# Patient Record
Sex: FEMALE | Race: WHITE | NOT HISPANIC OR LATINO | Employment: FULL TIME | URBAN - METROPOLITAN AREA
[De-identification: names, ages, dates, MRNs, and addresses within clinical notes are randomized per-mention and may not be internally consistent; named-entity substitution may affect disease eponyms.]

---

## 2017-05-19 DIAGNOSIS — B86 SCABIES: Primary | ICD-10-CM

## 2017-05-19 RX ORDER — PERMETHRIN 50 MG/G
CREAM TOPICAL
Qty: 60 G | Refills: 0 | Status: SHIPPED | OUTPATIENT
Start: 2017-05-19 | End: 2018-09-20

## 2017-06-26 DIAGNOSIS — G43.909 MIGRAINE WITHOUT STATUS MIGRAINOSUS, NOT INTRACTABLE, UNSPECIFIED MIGRAINE TYPE: ICD-10-CM

## 2017-06-26 RX ORDER — BUTALBITAL, ACETAMINOPHEN AND CAFFEINE 50; 325; 40 MG/1; MG/1; MG/1
1 TABLET ORAL EVERY 8 HOURS PRN
Qty: 30 TABLET | Refills: 1 | Status: SHIPPED | OUTPATIENT
Start: 2017-06-26 | End: 2018-09-20 | Stop reason: SDUPTHER

## 2017-09-11 DIAGNOSIS — G43.719 INTRACTABLE CHRONIC MIGRAINE WITHOUT AURA AND WITHOUT STATUS MIGRAINOSUS: ICD-10-CM

## 2017-09-11 DIAGNOSIS — K04.7 TOOTH ABSCESS: Primary | ICD-10-CM

## 2017-09-11 RX ORDER — ELETRIPTAN HYDROBROMIDE 40 MG/1
40 TABLET, FILM COATED ORAL
Qty: 10 TABLET | Refills: 1 | Status: SHIPPED | OUTPATIENT
Start: 2017-09-11 | End: 2018-09-20 | Stop reason: SDUPTHER

## 2018-09-20 ENCOUNTER — OFFICE VISIT (OUTPATIENT)
Dept: INTERNAL MEDICINE | Facility: CLINIC | Age: 48
End: 2018-09-20
Attending: INTERNAL MEDICINE
Payer: COMMERCIAL

## 2018-09-20 ENCOUNTER — LAB VISIT (OUTPATIENT)
Dept: LAB | Facility: OTHER | Age: 48
End: 2018-09-20
Attending: INTERNAL MEDICINE
Payer: COMMERCIAL

## 2018-09-20 VITALS
WEIGHT: 151 LBS | BODY MASS INDEX: 23.7 KG/M2 | OXYGEN SATURATION: 99 % | SYSTOLIC BLOOD PRESSURE: 120 MMHG | HEIGHT: 67 IN | DIASTOLIC BLOOD PRESSURE: 80 MMHG | HEART RATE: 68 BPM

## 2018-09-20 DIAGNOSIS — E55.9 VITAMIN D DEFICIENCY: ICD-10-CM

## 2018-09-20 DIAGNOSIS — F17.200 SMOKER: ICD-10-CM

## 2018-09-20 DIAGNOSIS — Z00.00 ROUTINE ADULT HEALTH MAINTENANCE: Primary | ICD-10-CM

## 2018-09-20 DIAGNOSIS — E78.9 DISORDER OF LIPID METABOLISM: ICD-10-CM

## 2018-09-20 DIAGNOSIS — D51.0 PERNICIOUS ANEMIA: ICD-10-CM

## 2018-09-20 DIAGNOSIS — R79.89 OTHER SPECIFIED ABNORMAL FINDINGS OF BLOOD CHEMISTRY: ICD-10-CM

## 2018-09-20 DIAGNOSIS — D50.8 OTHER IRON DEFICIENCY ANEMIA: ICD-10-CM

## 2018-09-20 DIAGNOSIS — G43.909 MIGRAINE WITHOUT STATUS MIGRAINOSUS, NOT INTRACTABLE, UNSPECIFIED MIGRAINE TYPE: ICD-10-CM

## 2018-09-20 DIAGNOSIS — E03.9 HYPOTHYROIDISM, UNSPECIFIED TYPE: ICD-10-CM

## 2018-09-20 DIAGNOSIS — J33.9 NASAL POLYPS: ICD-10-CM

## 2018-09-20 LAB
25(OH)D3+25(OH)D2 SERPL-MCNC: 25 NG/ML
ALBUMIN SERPL BCP-MCNC: 4.5 G/DL
ALP SERPL-CCNC: 60 U/L
ALT SERPL W/O P-5'-P-CCNC: 20 U/L
ANION GAP SERPL CALC-SCNC: 8 MMOL/L
AST SERPL-CCNC: 24 U/L
BASOPHILS # BLD AUTO: 0.03 K/UL
BASOPHILS NFR BLD: 0.3 %
BILIRUB SERPL-MCNC: 0.7 MG/DL
BUN SERPL-MCNC: 11 MG/DL
CALCIUM SERPL-MCNC: 9.9 MG/DL
CHLORIDE SERPL-SCNC: 105 MMOL/L
CHOLEST SERPL-MCNC: 248 MG/DL
CHOLEST/HDLC SERPL: 3.4 {RATIO}
CO2 SERPL-SCNC: 27 MMOL/L
CREAT SERPL-MCNC: 0.8 MG/DL
CRP SERPL-MCNC: 0.7 MG/L
DIFFERENTIAL METHOD: ABNORMAL
EOSINOPHIL # BLD AUTO: 0.2 K/UL
EOSINOPHIL NFR BLD: 2.3 %
ERYTHROCYTE [DISTWIDTH] IN BLOOD BY AUTOMATED COUNT: 13.2 %
EST. GFR  (AFRICAN AMERICAN): >60 ML/MIN/1.73 M^2
EST. GFR  (NON AFRICAN AMERICAN): >60 ML/MIN/1.73 M^2
ESTIMATED AVG GLUCOSE: 105 MG/DL
GLUCOSE SERPL-MCNC: 102 MG/DL
HBA1C MFR BLD HPLC: 5.3 %
HCT VFR BLD AUTO: 41.8 %
HDLC SERPL-MCNC: 72 MG/DL
HDLC SERPL: 29 %
HGB BLD-MCNC: 13.7 G/DL
LDLC SERPL CALC-MCNC: 158.6 MG/DL
LYMPHOCYTES # BLD AUTO: 1.9 K/UL
LYMPHOCYTES NFR BLD: 21 %
MCH RBC QN AUTO: 32.9 PG
MCHC RBC AUTO-ENTMCNC: 32.8 G/DL
MCV RBC AUTO: 101 FL
MONOCYTES # BLD AUTO: 0.7 K/UL
MONOCYTES NFR BLD: 7.4 %
NEUTROPHILS # BLD AUTO: 6.4 K/UL
NEUTROPHILS NFR BLD: 68.8 %
NONHDLC SERPL-MCNC: 176 MG/DL
PLATELET # BLD AUTO: 260 K/UL
PMV BLD AUTO: 10.8 FL
POTASSIUM SERPL-SCNC: 4.5 MMOL/L
PROT SERPL-MCNC: 8 G/DL
RBC # BLD AUTO: 4.16 M/UL
SODIUM SERPL-SCNC: 140 MMOL/L
TRIGL SERPL-MCNC: 87 MG/DL
TSH SERPL DL<=0.005 MIU/L-ACNC: 1.22 UIU/ML
VIT B12 SERPL-MCNC: 480 PG/ML
WBC # BLD AUTO: 9.25 K/UL

## 2018-09-20 PROCEDURE — 80061 LIPID PANEL: CPT

## 2018-09-20 PROCEDURE — 36415 COLL VENOUS BLD VENIPUNCTURE: CPT

## 2018-09-20 PROCEDURE — 83036 HEMOGLOBIN GLYCOSYLATED A1C: CPT

## 2018-09-20 PROCEDURE — 85025 COMPLETE CBC W/AUTO DIFF WBC: CPT

## 2018-09-20 PROCEDURE — 82607 VITAMIN B-12: CPT

## 2018-09-20 PROCEDURE — 80053 COMPREHEN METABOLIC PANEL: CPT

## 2018-09-20 PROCEDURE — 84443 ASSAY THYROID STIM HORMONE: CPT

## 2018-09-20 PROCEDURE — 82306 VITAMIN D 25 HYDROXY: CPT

## 2018-09-20 PROCEDURE — 86140 C-REACTIVE PROTEIN: CPT

## 2018-09-20 PROCEDURE — 99396 PREV VISIT EST AGE 40-64: CPT | Mod: S$GLB,,, | Performed by: INTERNAL MEDICINE

## 2018-09-20 RX ORDER — BUTALBITAL, ACETAMINOPHEN AND CAFFEINE 50; 325; 40 MG/1; MG/1; MG/1
1 TABLET ORAL EVERY 8 HOURS PRN
Qty: 30 TABLET | Refills: 1 | Status: SHIPPED | OUTPATIENT
Start: 2018-09-20 | End: 2019-12-20

## 2018-09-20 RX ORDER — ELETRIPTAN HYDROBROMIDE 40 MG/1
40 TABLET, FILM COATED ORAL
Qty: 15 TABLET | Refills: 3 | Status: SHIPPED | OUTPATIENT
Start: 2018-09-20 | End: 2019-07-11

## 2018-09-20 NOTE — PROGRESS NOTES
Subjective:       Patient ID: Shoaib Esqueda is a 48 y.o. female.    Chief Complaint: Annual Exam (refill meds / wants chest x ray) and Migraine (persist.)      Adult Wellness Exam:    Mental Conditions: None  Depression Risk Annual Factors: None  BMI: See Vital signs   Colon screen:    See Health Maintenance Report      Females: Mammogram and PAP: per Gyn                                 Vaccines (Flu, Adacel, Shingrix): See Health Maintenance Report  Routine labs (Cholesterol, Glucose/Hgb A1C, and TSH): Done or ordered.     The patient's current health status is: Good   Patient was educated on all medical problems and routine health maintanence. See Patient Instructions.                               Migraine    This is a recurrent problem. The current episode started more than 1 year ago. The problem has been unchanged. Associated symptoms include sinus pressure.     Review of Systems   Constitutional: Negative.    HENT: Positive for congestion and sinus pressure.    Respiratory: Negative for shortness of breath.    Cardiovascular: Negative for chest pain.   Neurological: Positive for headaches.       Objective:      Physical Exam   Constitutional: She appears well-developed and well-nourished.   HENT:   Head: Normocephalic.   Eyes: Pupils are equal, round, and reactive to light.   Cardiovascular: Normal rate, regular rhythm and normal heart sounds. Exam reveals no friction rub.   Pulmonary/Chest: Effort normal.   Neurological: She is alert.       Assessment:       1. Routine adult health maintenance    2. Migraine without status migrainosus, not intractable, unspecified migraine type    3. Nasal polyps    4. Smoker        Plan:       Per orders and D/C instructions.  Check labs.  Quit smoking.

## 2018-12-04 ENCOUNTER — OFFICE VISIT (OUTPATIENT)
Dept: INTERNAL MEDICINE | Facility: CLINIC | Age: 48
End: 2018-12-04
Attending: INTERNAL MEDICINE
Payer: COMMERCIAL

## 2018-12-04 VITALS
HEART RATE: 90 BPM | HEIGHT: 67 IN | DIASTOLIC BLOOD PRESSURE: 86 MMHG | WEIGHT: 153 LBS | OXYGEN SATURATION: 97 % | BODY MASS INDEX: 24.01 KG/M2 | SYSTOLIC BLOOD PRESSURE: 124 MMHG

## 2018-12-04 DIAGNOSIS — R19.7 DIARRHEA, UNSPECIFIED TYPE: Primary | ICD-10-CM

## 2018-12-04 PROCEDURE — 99213 OFFICE O/P EST LOW 20 MIN: CPT | Mod: S$GLB,,, | Performed by: INTERNAL MEDICINE

## 2018-12-04 PROCEDURE — 3008F BODY MASS INDEX DOCD: CPT | Mod: CPTII,S$GLB,, | Performed by: INTERNAL MEDICINE

## 2018-12-04 RX ORDER — METRONIDAZOLE 250 MG/1
500 TABLET ORAL EVERY 8 HOURS
Qty: 30 TABLET | Refills: 0 | Status: SHIPPED | OUTPATIENT
Start: 2018-12-04 | End: 2019-07-11

## 2018-12-04 NOTE — PROGRESS NOTES
Subjective:       Patient ID: Shoaib Esqueda is a 48 y.o. female.    Chief Complaint: Abdominal Pain (lower / since the week before thanksgiving); Diarrhea (otc not working); Nausea; and GI Problem (gas, yellow stool)    Diarrhea    This is a new problem. The current episode started 1 to 4 weeks ago. The problem occurs 2 to 4 times per day. The problem has been gradually improving. The stool consistency is described as watery.     Review of Systems   Constitutional: Negative.    Respiratory: Negative for shortness of breath.    Cardiovascular: Negative for chest pain.       Objective:      Physical Exam   Constitutional: She appears well-developed and well-nourished.   HENT:   Head: Normocephalic.   Eyes: Pupils are equal, round, and reactive to light.   Cardiovascular: Normal rate, regular rhythm and normal heart sounds. Exam reveals no friction rub.   Pulmonary/Chest: Effort normal.   Abdominal: Soft. Bowel sounds are increased. There is tenderness in the suprapubic area. There is no rigidity, no rebound and no guarding.       Mildly tender   Neurological: She is alert.       Assessment:       1. Diarrhea, unspecified type        Plan:       Per orders and D/C instructions.  Try Flagyl for diarrhea.

## 2019-07-01 DIAGNOSIS — N92.6 MENSTRUAL BLEEDING PROBLEM: Primary | ICD-10-CM

## 2019-07-08 DIAGNOSIS — R10.9 ABDOMINAL PAIN, UNSPECIFIED ABDOMINAL LOCATION: Primary | ICD-10-CM

## 2019-07-11 ENCOUNTER — OFFICE VISIT (OUTPATIENT)
Dept: OBSTETRICS AND GYNECOLOGY | Facility: CLINIC | Age: 49
End: 2019-07-11
Attending: STUDENT IN AN ORGANIZED HEALTH CARE EDUCATION/TRAINING PROGRAM
Payer: COMMERCIAL

## 2019-07-11 VITALS
DIASTOLIC BLOOD PRESSURE: 82 MMHG | HEIGHT: 67 IN | WEIGHT: 144.19 LBS | BODY MASS INDEX: 22.63 KG/M2 | SYSTOLIC BLOOD PRESSURE: 118 MMHG

## 2019-07-11 DIAGNOSIS — R35.0 FREQUENT URINATION: Primary | ICD-10-CM

## 2019-07-11 DIAGNOSIS — R10.2 PELVIC PAIN: ICD-10-CM

## 2019-07-11 DIAGNOSIS — Z32.02 ENCOUNTER FOR PREGNANCY TEST, RESULT NEGATIVE: ICD-10-CM

## 2019-07-11 LAB
APPEARANCE UR: CLEAR
B-HCG UR QL: NEGATIVE
BACTERIA #/AREA URNS HPF: NORMAL /HPF
BACTERIA UR CULT: NORMAL
BILIRUB SERPL-MCNC: NORMAL MG/DL
BILIRUB UR QL STRIP: NEGATIVE
BLOOD URINE, POC: NORMAL
COLOR UR: YELLOW
COLOR, POC UA: YELLOW
CTP QC/QA: YES
GLUCOSE UR QL STRIP: NEGATIVE
GLUCOSE UR QL STRIP: NORMAL
HGB UR QL STRIP: NEGATIVE
HYALINE CASTS #/AREA URNS LPF: NORMAL /LPF
KETONES UR QL STRIP: NEGATIVE
KETONES UR QL STRIP: NORMAL
LEUKOCYTE ESTERASE UR QL STRIP: NEGATIVE
LEUKOCYTE ESTERASE URINE, POC: NORMAL
NITRITE UR QL STRIP: NEGATIVE
NITRITE, POC UA: NORMAL
PH UR STRIP: 6 [PH] (ref 5–8)
PH, POC UA: 5
PROT UR QL STRIP: NEGATIVE
PROTEIN, POC: NORMAL
RBC #/AREA URNS HPF: NORMAL /HPF
SP GR UR STRIP: 1.02 (ref 1–1.03)
SPECIFIC GRAVITY, POC UA: 1.01
SQUAMOUS #/AREA URNS HPF: NORMAL /HPF
UREA BREATH TEST QL: NOT DETECTED
UROBILINOGEN, POC UA: NORMAL
WBC #/AREA URNS HPF: NORMAL /HPF

## 2019-07-11 PROCEDURE — 99203 PR OFFICE/OUTPT VISIT, NEW, LEVL III, 30-44 MIN: ICD-10-PCS | Mod: 25,S$GLB,, | Performed by: STUDENT IN AN ORGANIZED HEALTH CARE EDUCATION/TRAINING PROGRAM

## 2019-07-11 PROCEDURE — 81002 URINALYSIS NONAUTO W/O SCOPE: CPT | Mod: S$GLB,,, | Performed by: STUDENT IN AN ORGANIZED HEALTH CARE EDUCATION/TRAINING PROGRAM

## 2019-07-11 PROCEDURE — 99999 PR PBB SHADOW E&M-EST. PATIENT-LVL III: ICD-10-PCS | Mod: PBBFAC,,, | Performed by: STUDENT IN AN ORGANIZED HEALTH CARE EDUCATION/TRAINING PROGRAM

## 2019-07-11 PROCEDURE — 3008F PR BODY MASS INDEX (BMI) DOCUMENTED: ICD-10-PCS | Mod: CPTII,S$GLB,, | Performed by: STUDENT IN AN ORGANIZED HEALTH CARE EDUCATION/TRAINING PROGRAM

## 2019-07-11 PROCEDURE — 81025 POCT URINE PREGNANCY: ICD-10-PCS | Mod: S$GLB,,, | Performed by: STUDENT IN AN ORGANIZED HEALTH CARE EDUCATION/TRAINING PROGRAM

## 2019-07-11 PROCEDURE — 87086 URINE CULTURE/COLONY COUNT: CPT

## 2019-07-11 PROCEDURE — 81025 URINE PREGNANCY TEST: CPT | Mod: S$GLB,,, | Performed by: STUDENT IN AN ORGANIZED HEALTH CARE EDUCATION/TRAINING PROGRAM

## 2019-07-11 PROCEDURE — 99999 PR PBB SHADOW E&M-EST. PATIENT-LVL III: CPT | Mod: PBBFAC,,, | Performed by: STUDENT IN AN ORGANIZED HEALTH CARE EDUCATION/TRAINING PROGRAM

## 2019-07-11 PROCEDURE — 81002 POCT URINE DIPSTICK WITHOUT MICROSCOPE: ICD-10-PCS | Mod: S$GLB,,, | Performed by: STUDENT IN AN ORGANIZED HEALTH CARE EDUCATION/TRAINING PROGRAM

## 2019-07-11 PROCEDURE — 99203 OFFICE O/P NEW LOW 30 MIN: CPT | Mod: 25,S$GLB,, | Performed by: STUDENT IN AN ORGANIZED HEALTH CARE EDUCATION/TRAINING PROGRAM

## 2019-07-11 PROCEDURE — 3008F BODY MASS INDEX DOCD: CPT | Mod: CPTII,S$GLB,, | Performed by: STUDENT IN AN ORGANIZED HEALTH CARE EDUCATION/TRAINING PROGRAM

## 2019-07-11 RX ORDER — VALACYCLOVIR HYDROCHLORIDE 1 G/1
1000 TABLET, FILM COATED ORAL
COMMUNITY
Start: 2019-05-21 | End: 2020-07-14 | Stop reason: SDUPTHER

## 2019-07-11 RX ORDER — CETIRIZINE HYDROCHLORIDE 5 MG/1
5 TABLET ORAL DAILY
COMMUNITY

## 2019-07-11 NOTE — PROGRESS NOTES
"Chief Complaint: Pelvic pain     HPI:      Shoaib Esqueda is a 48 y.o.  who presents today for evaluation of multiple complaints.  She reports a history of pelvic pain, possible endometriosis, possible PCOS.  She reports doing well on OCPs initially the had to change types and felt symptoms worsen.  She has not been on treatment for several years.  She reports the pain is left sided, occurs almost daily, feels "throbbing."  Denies any aggravating or alleviating factors.  Has trouble describing it further.  She reports it used to improve with her cycle but now she feels it all the time.   She also reports bowel movements 5-6 times per day.  Denies any diarrhea or constipation.  No blood in stool.      She also reports recurrent vaginitis and odor.  Reports she has struggled with this for years.  Reports a vaginal odor and recurrent bacterial vaginosis.  Does notice an odor today.      She also reports she does not get mammograms secondary to her implants and concerns that they may burst.      LMP: Patient's last menstrual period was 2019.  No other issues, problems, or complaints.  Ms. Esqueda is not currently sexually active. She is currently using no method for contraception. She declines STD screening today.    Previous Pap: 2019 Normal per patient  Previous Mammogram: 2 years ago normal per patient  Most Recent Dexa: NA  Colonoscopy: NA    GYN Hx:  Menarche 12.  Reports cycles occur every 28 days with menses lasting 1 days.  Denies history of abnormal pap smears or STIs.  Gardasil:  No.   OB History        3    Para        Term                AB   3    Living           SAB        TAB   3    Ectopic        Multiple        Live Births   0               Past Medical History:   Diagnosis Date    Hepatitis C      Rx w/ chemo x 3 months.  Current  viral load is zero  Recent IUD (copper)     Migraine 2014    Nasal polyps 2014    PCOS (polycystic ovarian syndrome) 2014    " PPD positive 10/27/2012     Past Surgical History:   Procedure Laterality Date    BREAST SURGERY      Implants    SINUS SURGERY      x3     Social History     Socioeconomic History    Marital status: Single     Spouse name: Not on file    Number of children: 0    Years of education: Not on file    Highest education level: Not on file   Occupational History     Employer: [employer]    Occupation:  for [employer]     Employer: [employer]   Social Needs    Financial resource strain: Not on file    Food insecurity:     Worry: Not on file     Inability: Not on file    Transportation needs:     Medical: Not on file     Non-medical: Not on file   Tobacco Use    Smoking status: Current Every Day Smoker     Packs/day: 0.50     Years: 15.00     Pack years: 7.50     Types: Cigarettes    Smokeless tobacco: Never Used   Substance and Sexual Activity    Alcohol use: Yes     Alcohol/week: 2.4 oz     Types: 4 Cans of beer per week     Frequency: 2-3 times a week     Drinks per session: 1 or 2     Binge frequency: Never    Drug use: No    Sexual activity: Yes     Partners: Male   Lifestyle    Physical activity:     Days per week: Not on file     Minutes per session: Not on file    Stress: Not on file   Relationships    Social connections:     Talks on phone: Not on file     Gets together: Not on file     Attends Restorationist service: Not on file     Active member of club or organization: Not on file     Attends meetings of clubs or organizations: Not on file     Relationship status: Not on file   Other Topics Concern    Not on file   Social History Narrative    Runs and lifts weights.    Father is a radiologist     Family History   Problem Relation Age of Onset    Cancer Maternal Aunt     Cancer Maternal Uncle     Breast cancer Neg Hx     Ovarian cancer Neg Hx        Current Outpatient Medications:     butalbital-acetaminophen-caffeine -40 mg (FIORICET, ESGIC) -40 mg  "per tablet, Take 1 tablet by mouth every 8 (eight) hours as needed., Disp: 30 tablet, Rfl: 1    cetirizine (ZYRTEC) 5 MG tablet, Take 5 mg by mouth., Disp: , Rfl:     valACYclovir (VALTREX) 1000 MG tablet, Take 1,000 mg by mouth., Disp: , Rfl:     ROS:     GENERAL: Denies unintentional weight gain or weight loss. Feeling well overall.   SKIN: Denies rash or lesions.   HEENT: Denies headaches, or vision changes.   CARDIOVASCULAR: Denies palpitations or chest pain.   RESPIRATORY: Denies shortness of breath or dyspnea on exertion.  BREASTS: Denies pain, lumps, or nipple discharge.   ABDOMEN: Denies abdominal pain, constipation, diarrhea, nausea, vomiting, change in appetite.  URINARY: Denies frequency, dysuria, hematuria.  NEUROLOGIC: Denies syncope or weakness.   PSYCHIATRIC: Denies depression, anxiety or mood swings.    Physical Exam:      PHYSICAL EXAM:  /82   Ht 5' 7" (1.702 m)   Wt 65.4 kg (144 lb 2.9 oz)   LMP 06/27/2019   BMI 22.58 kg/m²   Body mass index is 22.58 kg/m².     APPEARANCE: Well nourished, well developed, in no acute distress.  PSYCH: Appropriate mood and affect.  SKIN: No acne or hirsutism.  NECK: Neck symmetric without masses or thyromegaly.  NODES: No inguinal, axillary, or supraclavicular lymph node enlargement.  CHEST: Normal respiratory effort.    CARDIOVASCULAR:  Regular rate and rhythm.  LUNGS:  Clear to auscultation bilaterally.  BREASTS: Symmetrical, no skin changes or visible lesions.  No palpable masses or nipple discharge bilaterally.  ABDOMEN: Soft.  No tenderness or masses.    PELVIC: Normal external genitalia without lesions.  Normal hair distribution.  Adequate perineal body, normal urethral meatus.  Vagina moist and well rugated without lesions or discharge.  Cervix pink, without lesions, discharge or tenderness.  No significant cystocele or rectocele.  Bimanual exam shows uterus to be normal size, regular, mobile and nontender.  Adnexa without masses or tenderness.  "   EXTREMITIES: No edema.  No tenderness to palpation.    Labs:  UPT negative  Wet prep negative    Assessment/Plan:     48 y.o.     Frequent urination  -     POCT urine dipstick without microscope  -     Urine culture    Encounter for pregnancy test, result negative  -     POCT urine pregnancy    Pelvic pain  -     US Pelvis Comp with Transvag NON-OB (xpd; Future; Expected date: 2019          Counselin.  Pelvic pain:  No pain on examination today.  U/S ordered.  Declines cultures.   Also discussed GI referral if symptoms continue.   2.  Vaginal discharge:  Negative for vaginitis today.  Discussed boric acid supp if symptoms continue.    3.  Patient declines mammogram today.   4.  Follow up with PCP for other health maintenance.  5.  RTC in 2 weeks or sooner if needed.    Use of the [facility] Patient Portal discussed and encouraged during today's visit.

## 2019-07-22 ENCOUNTER — TELEPHONE (OUTPATIENT)
Dept: OBSTETRICS AND GYNECOLOGY | Facility: CLINIC | Age: 49
End: 2019-07-22

## 2019-07-22 NOTE — TELEPHONE ENCOUNTER
Dr Lopez pt calling, pt has u/s scheduled tomorrow 7-23-19 @ 8:30morning and started her cycle.pt wants to know if she needs to r/s or keep appt wants to know what Dr Lopez would like for her to do.Pt # [phone]

## 2019-07-23 ENCOUNTER — TELEPHONE (OUTPATIENT)
Dept: OBSTETRICS AND GYNECOLOGY | Facility: CLINIC | Age: 49
End: 2019-07-23

## 2019-07-23 NOTE — TELEPHONE ENCOUNTER
Phone call made to patient to discuss results.  Awaiting records of prior U/S.  Reviewed U/ S results with patient and all questions answered.  She is going to monitor her symptoms at this time and will call us to update us in a few months.  All questions answered.

## 2019-12-19 DIAGNOSIS — G43.909 MIGRAINE WITHOUT STATUS MIGRAINOSUS, NOT INTRACTABLE, UNSPECIFIED MIGRAINE TYPE: ICD-10-CM

## 2019-12-20 RX ORDER — BUTALBITAL, ACETAMINOPHEN AND CAFFEINE 50; 325; 40 MG/1; MG/1; MG/1
TABLET ORAL
Qty: 30 TABLET | Refills: 0 | Status: SHIPPED | OUTPATIENT
Start: 2019-12-20 | End: 2021-03-30 | Stop reason: SDUPTHER

## 2020-01-16 ENCOUNTER — TELEPHONE (OUTPATIENT)
Dept: OBSTETRICS AND GYNECOLOGY | Facility: CLINIC | Age: 50
End: 2020-01-16

## 2020-01-16 DIAGNOSIS — R10.2 PELVIC PAIN: Primary | ICD-10-CM

## 2020-01-16 NOTE — TELEPHONE ENCOUNTER
"Fired last doctor because "he was in her father medical class and he wasn't helping her pain" and she "wouldn't even let her father touch her"     She has been keeping track of her cycles since she was last seen; this is the first month that she has not had the excruciating pain that starts mid cycle.  This month the pain started the day before her period.  States every month it starts mid cycle when she ovulates continues through period and now extending after period ends.  She is in excruciating pain for 2.5 weeks.  She has terrible pain "like she is going to die".  She doesn't know how much longer she can live with this pain.  She says she has the classic symptoms of "hysteria" such as violence and lost 20 pounds due to pain.  States she could "kill a lot of people by punching them in the face".  She has not acted on this violence but states she could because she is in so much pain.  Any type of surgery is NOT an option for her.  She has diagnosed herself with [place]? But her mother says she is too old to have it.     She is asking for a pill to stop the uterine contractions.  She read she can take a "shit ton" of Zinc right before to help with the pain but hasn't tried it yet.     States she has not done labs yet; explained that labs would not tell her why she is having pain.  She said it could tell her if she is in menopause but advised if she was in menopause she would not be having periods.      Anything she can take that can stop the throbbing pain?  She cannot be on narcotic pain medication    appt offered.  She doesn't have time to come in; she is getting ready for a big trial.  She said she has already come in and nothing was done the first time she came.  Advised it can take more than one appt to figure out what is going on.  States "yall talk and tell me what options there are"     She is 49 and has never had kids so she thinks this is the reason the pain is so severe and that she will have to " live the rest of her life like this.

## 2020-01-16 NOTE — TELEPHONE ENCOUNTER
"Dr Lopez pt calling, she still having horrible pain with her cycle and she is aware there is nothing to do for this wants to know if there is something she can take like a pill. Doesn't wants surgery or narcotics but its unbearable and in a mad mood could "kill'' someone that is how back it is. Pt # [phone]  "

## 2020-01-16 NOTE — TELEPHONE ENCOUNTER
I am fine with U/S in radiology.  She needs to schedule an actual appt with me though as well for an exam and to figure out what is going on.  Thank you!

## 2020-01-16 NOTE — TELEPHONE ENCOUNTER
Advised per Dr. Lopez    She will have to call back to schedule.  She wants to schedule US in Radiology not in the office.

## 2020-01-31 ENCOUNTER — OFFICE VISIT (OUTPATIENT)
Dept: INTERNAL MEDICINE | Facility: CLINIC | Age: 50
End: 2020-01-31
Attending: INTERNAL MEDICINE
Payer: COMMERCIAL

## 2020-01-31 VITALS
SYSTOLIC BLOOD PRESSURE: 120 MMHG | HEART RATE: 111 BPM | BODY MASS INDEX: 22.76 KG/M2 | WEIGHT: 145 LBS | OXYGEN SATURATION: 95 % | HEIGHT: 67 IN | DIASTOLIC BLOOD PRESSURE: 80 MMHG

## 2020-01-31 DIAGNOSIS — D50.8 OTHER IRON DEFICIENCY ANEMIA: ICD-10-CM

## 2020-01-31 DIAGNOSIS — E55.9 VITAMIN D DEFICIENCY: ICD-10-CM

## 2020-01-31 DIAGNOSIS — R22.0 SWELLING OF LEFT SIDE OF FACE: ICD-10-CM

## 2020-01-31 DIAGNOSIS — R79.89 OTHER SPECIFIED ABNORMAL FINDINGS OF BLOOD CHEMISTRY: ICD-10-CM

## 2020-01-31 DIAGNOSIS — E03.9 HYPOTHYROIDISM, UNSPECIFIED TYPE: ICD-10-CM

## 2020-01-31 DIAGNOSIS — E78.9 DISORDER OF LIPID METABOLISM: ICD-10-CM

## 2020-01-31 DIAGNOSIS — Z00.00 ROUTINE ADULT HEALTH MAINTENANCE: Primary | ICD-10-CM

## 2020-01-31 DIAGNOSIS — D51.0 PERNICIOUS ANEMIA: ICD-10-CM

## 2020-01-31 DIAGNOSIS — G43.909 MIGRAINE WITHOUT STATUS MIGRAINOSUS, NOT INTRACTABLE, UNSPECIFIED MIGRAINE TYPE: Primary | ICD-10-CM

## 2020-01-31 DIAGNOSIS — Z00.00 ROUTINE ADULT HEALTH MAINTENANCE: ICD-10-CM

## 2020-01-31 PROCEDURE — 99396 PR PREVENTIVE VISIT,EST,40-64: ICD-10-PCS | Mod: S$GLB,,, | Performed by: INTERNAL MEDICINE

## 2020-01-31 PROCEDURE — 99396 PREV VISIT EST AGE 40-64: CPT | Mod: S$GLB,,, | Performed by: INTERNAL MEDICINE

## 2020-01-31 RX ORDER — DOXYCYCLINE HYCLATE 100 MG
100 TABLET ORAL 2 TIMES DAILY
Qty: 14 TABLET | Refills: 0 | Status: SHIPPED | OUTPATIENT
Start: 2020-01-31 | End: 2020-07-14

## 2020-01-31 NOTE — PROGRESS NOTES
Subjective:       Patient ID: Shoaib Esqueda is a 49 y.o. female.    Chief Complaint: Annual Exam and Mass (left side of the face below the temple)    The left side of her face lateral to her high became swollen yesterday.  She denies any trauma.  She applied heat and suck on lemon drops and is much better today.  She does have a picture of it being swollen yesterday.      Adult Wellness Exam:    Mental Conditions: None  Depression Risk Annual Factors: None  BMI: See Vital signs   Colon screen:    See Health Maintenance Report      Females: Mammogram and PAP: per Gyn                                 Vaccines (Flu, Adacel, Shingrix): See Health Maintenance Report  Routine labs (Cholesterol, Glucose/Hgb A1C, and TSH): Done or ordered.     The patient's current health status is: Good   Patient was educated on all medical problems and routine health maintanence. See Patient Instructions.                             Review of Systems   Constitutional: Negative.    Respiratory: Negative for shortness of breath.    Cardiovascular: Negative for chest pain.       Objective:      Physical Exam   Constitutional: She appears well-developed and well-nourished.   HENT:   Head: Normocephalic.   Eyes: Pupils are equal, round, and reactive to light.   Cardiovascular: Normal rate, regular rhythm and normal heart sounds. Exam reveals no friction rub.   Pulmonary/Chest: Effort normal.   Neurological: She is alert.       Assessment:       1. Migraine without status migrainosus, not intractable, unspecified migraine type    2. Routine adult health maintenance    3. Swelling of left side of face        Plan:       Per orders and D/C instructions.  Continue Emgality for migraines.  She would like to get an MRI of her breasts.  She is fearful of doing a mammogram because she has an capsulated breast implants.  Check labs.  Continue with heat and lemon drops.  I do not think she needs antibiotics, but I gave her a prescription for doxycycline  in case it flares up over the weekend.

## 2020-01-31 NOTE — PATIENT INSTRUCTIONS
Tips for Healthy Living and Routine Preventative Care - 2020                                                            (These guidelines are intended for healthy adults)      1. Exercise  Exercise aerobically with a target heart rate of (220-age) x 0.8  Exercise 30-45 minutes on most days of the week    2. Diet and Supplements- All supplements can be obtained through a varied, healthy diet   Calcium: 1,000 - 1,200 mg each day   8 oz milk, Calcium fortified O.J., or Yogurt = 300 mg, 1oz of cheese =100-200 mg  Vitamin D: 800 iu each day- Can probably be obtained by 30 min. of direct sunlight    each day             3 oz. Garfield = 800 iu,  3 oz. Tuna =150 iu, Milk or fortified O.J. = 120 iu  Fish oil: 1-2 grams each day or about 840 mg of EPA and DHA (Omega-3 fatty acids) each day             3 oz. Garfield=2 grams,  3 oz. Tuna=1.3 grams,  3 oz. drained light Tuna= 0.25 grams  Folic acid 800 mcg each day for all women planning or capable of pregnancy    3. Lifestyle  Alcohol: 1 drink = 12 oz. domestic beer, 4 oz. wine, or 1 oz. hard (80 proof) liquor             Males: </= 14 drinks per week with no more than 4 in any one day             Females: </= 7 drinks per week with no more than 3 in any one day  Salt: 1.2 - 3 grams of Sodium each day.  Tobacco: Dont smoke, or quit smoking (discuss with your doctor)  Depression: If you feel depressed discuss with your doctor  Weight: Maintain a healthy body weight. Stay within 10% of:             Males: 106 lbs. + 6 lbs per inch height above 5 feet             Females: 100 lbs + 5 lbs per inch height above 5 feet    4. Routine tests  Blood pressure check at each visit, or at least once each year  HIV screening (one time) if less than 65 years old  Hepatitis C screen (one time) if born between 1945 and 1965  Cholesterol screening every 3 years starting at age 21  Glucose check every 2-3 years starting at age 45  TSH (thyroid screen) every 2 years starting at age 50  Colonoscopy  at age 50, and repeat every 10 years until age 75  Vision screen at age 65    Females:  Pap smear every three years starting at age 21                  Stop screening at age 65 if past 3 pap smears were normal                  No screening for women who have had a hysterectomy with removal of cervix  Mammogram every 1-2 years starting at age 40 (or age 50) until age 75.  Bone density scan at about age 65      Males:  Consider PSA screening annually at age 50, age 45 for  Americans, until age 75                 5. Immunizations  Influenza vaccine every year in the fall, especially if >50 or with a chronic disease  Tetnus/Diptheria/Pertusis (Tdap) vaccine once (after the age of 18), then Tetnus/Diptheria (Td) vaccine every 10 years  Shingles (Shingrix) vaccine after age 50 and get a 2nd dose after 2-6 months  Pneumonia vaccine at age 65. 2nd Pneumonia vaccine at least 1 year later (1st should be Prevnar-13, and 2nd should be Pneumovax-23).

## 2020-02-01 LAB
25(OH)D3 SERPL-MCNC: 16 NG/ML (ref 30–100)
ALBUMIN SERPL-MCNC: 4.4 G/DL (ref 3.6–5.1)
ALBUMIN/GLOB SERPL: 1.7 (CALC) (ref 1–2.5)
ALP SERPL-CCNC: 58 U/L (ref 33–130)
ALT SERPL-CCNC: 10 U/L (ref 6–29)
AST SERPL-CCNC: 15 U/L (ref 10–35)
BASOPHILS # BLD AUTO: 53 CELLS/UL (ref 0–200)
BASOPHILS NFR BLD AUTO: 0.7 %
BILIRUB SERPL-MCNC: 0.5 MG/DL (ref 0.2–1.2)
BUN SERPL-MCNC: 13 MG/DL (ref 7–25)
BUN/CREAT SERPL: ABNORMAL (CALC) (ref 6–22)
CALCIUM SERPL-MCNC: 9.6 MG/DL (ref 8.6–10.4)
CHLORIDE SERPL-SCNC: 106 MMOL/L (ref 98–110)
CHOLEST SERPL-MCNC: 202 MG/DL
CHOLEST/HDLC SERPL: 3.3 (CALC)
CO2 SERPL-SCNC: 28 MMOL/L (ref 20–32)
CREAT SERPL-MCNC: 0.72 MG/DL (ref 0.5–1.05)
EOSINOPHIL # BLD AUTO: 160 CELLS/UL (ref 15–500)
EOSINOPHIL NFR BLD AUTO: 2.1 %
ERYTHROCYTE [DISTWIDTH] IN BLOOD BY AUTOMATED COUNT: 12.4 % (ref 11–15)
GFRSERPLBLD MDRD-ARVRAT: 98 ML/MIN/1.73M2
GLOBULIN SER CALC-MCNC: 2.6 G/DL (CALC) (ref 1.9–3.7)
GLUCOSE SERPL-MCNC: 105 MG/DL (ref 65–99)
HBA1C MFR BLD: 5.2 % OF TOTAL HGB
HCT VFR BLD AUTO: 41.2 % (ref 35–45)
HDLC SERPL-MCNC: 62 MG/DL
HGB BLD-MCNC: 13.4 G/DL (ref 11.7–15.5)
LDLC SERPL CALC-MCNC: 120 MG/DL (CALC)
LYMPHOCYTES # BLD AUTO: 1664 CELLS/UL (ref 850–3900)
LYMPHOCYTES NFR BLD AUTO: 21.9 %
MCH RBC QN AUTO: 32.1 PG (ref 27–33)
MCHC RBC AUTO-ENTMCNC: 32.5 G/DL (ref 32–36)
MCV RBC AUTO: 98.6 FL (ref 80–100)
MONOCYTES # BLD AUTO: 540 CELLS/UL (ref 200–950)
MONOCYTES NFR BLD AUTO: 7.1 %
NEUTROPHILS # BLD AUTO: 5183 CELLS/UL (ref 1500–7800)
NEUTROPHILS NFR BLD AUTO: 68.2 %
NONHDLC SERPL-MCNC: 140 MG/DL (CALC)
PLATELET # BLD AUTO: 260 THOUSAND/UL (ref 140–400)
PMV BLD REES-ECKER: 11 FL (ref 7.5–12.5)
POTASSIUM SERPL-SCNC: 4.2 MMOL/L (ref 3.5–5.3)
PROT SERPL-MCNC: 7 G/DL (ref 6.1–8.1)
RBC # BLD AUTO: 4.18 MILLION/UL (ref 3.8–5.1)
SODIUM SERPL-SCNC: 140 MMOL/L (ref 135–146)
TRIGL SERPL-MCNC: 97 MG/DL
TSH SERPL-ACNC: 0.76 MIU/L
WBC # BLD AUTO: 7.6 THOUSAND/UL (ref 3.8–10.8)

## 2020-02-02 LAB — VIT B12 SERPL-MCNC: 364 PG/ML (ref 200–1100)

## 2020-04-07 DIAGNOSIS — G47.00 INSOMNIA, UNSPECIFIED TYPE: Primary | ICD-10-CM

## 2020-04-07 RX ORDER — TRAZODONE HYDROCHLORIDE 50 MG/1
TABLET ORAL
Qty: 30 TABLET | Refills: 5 | Status: SHIPPED | OUTPATIENT
Start: 2020-04-07 | End: 2020-07-14

## 2020-07-14 ENCOUNTER — OFFICE VISIT (OUTPATIENT)
Dept: INTERNAL MEDICINE | Facility: CLINIC | Age: 50
End: 2020-07-14
Attending: INTERNAL MEDICINE
Payer: COMMERCIAL

## 2020-07-14 ENCOUNTER — OFFICE VISIT (OUTPATIENT)
Dept: OBSTETRICS AND GYNECOLOGY | Facility: CLINIC | Age: 50
End: 2020-07-14
Attending: STUDENT IN AN ORGANIZED HEALTH CARE EDUCATION/TRAINING PROGRAM
Payer: COMMERCIAL

## 2020-07-14 VITALS
WEIGHT: 146 LBS | HEIGHT: 67 IN | SYSTOLIC BLOOD PRESSURE: 122 MMHG | BODY MASS INDEX: 22.91 KG/M2 | HEART RATE: 81 BPM | OXYGEN SATURATION: 98 % | DIASTOLIC BLOOD PRESSURE: 80 MMHG

## 2020-07-14 VITALS
WEIGHT: 147.25 LBS | BODY MASS INDEX: 23.11 KG/M2 | SYSTOLIC BLOOD PRESSURE: 110 MMHG | DIASTOLIC BLOOD PRESSURE: 76 MMHG | HEIGHT: 67 IN

## 2020-07-14 DIAGNOSIS — R10.2 PELVIC PAIN: ICD-10-CM

## 2020-07-14 DIAGNOSIS — N63.20 LEFT BREAST LUMP: ICD-10-CM

## 2020-07-14 DIAGNOSIS — G43.909 MIGRAINE WITHOUT STATUS MIGRAINOSUS, NOT INTRACTABLE, UNSPECIFIED MIGRAINE TYPE: Primary | ICD-10-CM

## 2020-07-14 DIAGNOSIS — Z12.4 SCREENING FOR CERVICAL CANCER: ICD-10-CM

## 2020-07-14 DIAGNOSIS — Z01.419 WELL WOMAN EXAM: ICD-10-CM

## 2020-07-14 DIAGNOSIS — R21 RASH: ICD-10-CM

## 2020-07-14 DIAGNOSIS — Z11.51 SCREENING FOR HPV (HUMAN PAPILLOMAVIRUS): Primary | ICD-10-CM

## 2020-07-14 DIAGNOSIS — G47.00 INSOMNIA, UNSPECIFIED TYPE: ICD-10-CM

## 2020-07-14 DIAGNOSIS — N94.6 PAINFUL MENSTRUAL PERIODS: ICD-10-CM

## 2020-07-14 PROCEDURE — 88175 CYTOPATH C/V AUTO FLUID REDO: CPT

## 2020-07-14 PROCEDURE — 99213 OFFICE O/P EST LOW 20 MIN: CPT | Mod: S$GLB,,, | Performed by: INTERNAL MEDICINE

## 2020-07-14 PROCEDURE — 3008F BODY MASS INDEX DOCD: CPT | Mod: CPTII,S$GLB,, | Performed by: INTERNAL MEDICINE

## 2020-07-14 PROCEDURE — 99999 PR PBB SHADOW E&M-EST. PATIENT-LVL IV: ICD-10-PCS | Mod: PBBFAC,,, | Performed by: STUDENT IN AN ORGANIZED HEALTH CARE EDUCATION/TRAINING PROGRAM

## 2020-07-14 PROCEDURE — 99396 PREV VISIT EST AGE 40-64: CPT | Mod: S$GLB,,, | Performed by: STUDENT IN AN ORGANIZED HEALTH CARE EDUCATION/TRAINING PROGRAM

## 2020-07-14 PROCEDURE — 99213 PR OFFICE/OUTPT VISIT, EST, LEVL III, 20-29 MIN: ICD-10-PCS | Mod: S$GLB,,, | Performed by: INTERNAL MEDICINE

## 2020-07-14 PROCEDURE — 87624 HPV HI-RISK TYP POOLED RSLT: CPT

## 2020-07-14 PROCEDURE — 99396 PR PREVENTIVE VISIT,EST,40-64: ICD-10-PCS | Mod: S$GLB,,, | Performed by: STUDENT IN AN ORGANIZED HEALTH CARE EDUCATION/TRAINING PROGRAM

## 2020-07-14 PROCEDURE — 3008F PR BODY MASS INDEX (BMI) DOCUMENTED: ICD-10-PCS | Mod: CPTII,S$GLB,, | Performed by: INTERNAL MEDICINE

## 2020-07-14 PROCEDURE — 99999 PR PBB SHADOW E&M-EST. PATIENT-LVL IV: CPT | Mod: PBBFAC,,, | Performed by: STUDENT IN AN ORGANIZED HEALTH CARE EDUCATION/TRAINING PROGRAM

## 2020-07-14 RX ORDER — TRAZODONE HYDROCHLORIDE 50 MG/1
TABLET ORAL
Qty: 30 TABLET | Refills: 5 | Status: CANCELLED | OUTPATIENT
Start: 2020-07-14

## 2020-07-14 RX ORDER — VALACYCLOVIR HYDROCHLORIDE 1 G/1
1000 TABLET, FILM COATED ORAL DAILY
Qty: 90 TABLET | Refills: 4 | Status: SHIPPED | OUTPATIENT
Start: 2020-07-14 | End: 2021-08-08

## 2020-07-14 NOTE — PROGRESS NOTES
Subjective:       Patient ID: Shoaib Esqueda is a 49 y.o. female.    Chief Complaint: Hypertension and Insect Bite (wants refill of permethirin )    She gets spots on her skin which come and go.  They seem to come in 3's.  They do not particularly itch.  She has seen Dr. Tovar in the past and she gave her steroid cream and doxycycline.  She has try permethrin cream recently.  She she checked her blood pressure recently and it was high.    Review of Systems   Constitutional: Negative.    Respiratory: Negative for shortness of breath.    Cardiovascular: Negative for chest pain.   Integumentary:  Positive for rash.         Objective:      Physical Exam  Constitutional:       Appearance: She is well-developed.   HENT:      Head: Normocephalic.   Eyes:      Pupils: Pupils are equal, round, and reactive to light.   Cardiovascular:      Rate and Rhythm: Normal rate and regular rhythm.      Heart sounds: Normal heart sounds. No friction rub.   Pulmonary:      Effort: Pulmonary effort is normal.   Skin:     Comments: Multiple small follicular lesions over outer thighs.   Neurological:      Mental Status: She is alert.         Assessment:       1. Migraine without status migrainosus, not intractable, unspecified migraine type    2. Rash        Plan:       Per orders and D/C instructions.     continue Emgality for migraine prevention.  Try Hibiclens soap for folliculitis.

## 2020-07-14 NOTE — PROGRESS NOTES
Chief Complaint: Well Woman Exam     HPI:      Shoaib Esqueda is a 49 y.o.  who presents today for well woman exam.  LMP: Patient's last menstrual period was 2020 (exact date).  She reports continued dysmenorrhea and pelvic pain - mostly around the time of her menses.  Cycles are not heavy.  She reports pain is mostly bilateral.  Sometimes ibuprofen works.  Sometimes she needs tramadol.  Reports she may need 2 different narcotics for the pain.  She is not having any pain today.  Denies any dysuria or hematuria.  No constipation or diarrhea.  Had a colonoscopy in the s.  No other issues, problems, or complaints. Specifically, patient denies abnormal vaginal bleeding, discharge, urinary problems, or changes in appetite. Ms. Esqueda is not currently sexually active. She is currently using no method for contraception. She declines STD screening today.    Previous Pap: Needs  Previous Mammogram: Declines  Most Recent Dexa: NA  Colonoscopy: Needs    OB History        3    Para        Term                AB   3    Living           SAB        TAB   3    Ectopic        Multiple        Live Births   0               Past Medical History:   Diagnosis Date    Hepatitis C      Rx w/ chemo x 3 months.  Current  viral load is zero  Recent IUD (copper)     Migraine 2014    Nasal polyps 2014    PCOS (polycystic ovarian syndrome) 2014    PPD positive 10/27/2012     Past Surgical History:   Procedure Laterality Date    BREAST SURGERY      Implants    SINUS SURGERY      x3     Social History     Socioeconomic History    Marital status: Single     Spouse name: Not on file    Number of children: 0    Years of education: Not on file    Highest education level: Not on file   Occupational History     Employer: [employer]    Occupation:  for [employer]     Employer: [employer]   Social Needs    Financial resource strain: Not on file    Food  insecurity     Worry: Not on file     Inability: Not on file    Transportation needs     Medical: Not on file     Non-medical: Not on file   Tobacco Use    Smoking status: Current Every Day Smoker     Packs/day: 0.50     Years: 15.00     Pack years: 7.50     Types: Cigarettes    Smokeless tobacco: Never Used   Substance and Sexual Activity    Alcohol use: Yes     Alcohol/week: 4.0 standard drinks     Types: 4 Cans of beer per week     Frequency: 2-3 times a week     Drinks per session: 1 or 2     Binge frequency: Never    Drug use: No    Sexual activity: Yes     Partners: Male   Lifestyle    Physical activity     Days per week: Not on file     Minutes per session: Not on file    Stress: Not on file   Relationships    Social connections     Talks on phone: Not on file     Gets together: Not on file     Attends Catholic service: Not on file     Active member of club or organization: Not on file     Attends meetings of clubs or organizations: Not on file     Relationship status: Not on file   Other Topics Concern    Not on file   Social History Narrative    Runs and lifts weights.    Father is a radiologist     Family History   Problem Relation Age of Onset    Cancer Maternal Aunt     Cancer Maternal Uncle     Breast cancer Neg Hx     Ovarian cancer Neg Hx        Current Outpatient Medications:     butalbital-acetaminophen-caffeine -40 mg (FIORICET, ESGIC) -40 mg per tablet, TAKE 1 TABLET BY MOUTH EVERY 8 HOURS AS NEEDED, Disp: 30 tablet, Rfl: 0    cetirizine (ZYRTEC) 5 MG tablet, Take 5 mg by mouth., Disp: , Rfl:     doxycycline (VIBRA-TABS) 100 MG tablet, Take 1 tablet (100 mg total) by mouth 2 (two) times daily., Disp: 14 tablet, Rfl: 0    galcanezumab-gnlm 120 mg/mL PnIj, Inject 120 mg into the skin every 28 days., Disp: , Rfl:     traZODone (DESYREL) 50 MG tablet, 1/2-1 nightly, Disp: 30 tablet, Rfl: 5    valACYclovir (VALTREX) 1000 MG tablet, Take 1,000 mg by mouth., Disp: , Rfl:  "    ROS:     GENERAL: Denies unintentional weight gain or weight loss. Feeling well overall.   SKIN: Denies rash or lesions.   HEENT: Denies headaches, or vision changes.   CARDIOVASCULAR: Denies palpitations or chest pain.   RESPIRATORY: Denies shortness of breath or dyspnea on exertion.  BREASTS: Denies pain, lumps, or nipple discharge.   ABDOMEN: Denies abdominal pain, constipation, diarrhea, nausea, vomiting, change in appetite.  URINARY: Denies frequency, dysuria, hematuria.  NEUROLOGIC: Denies syncope or weakness.   PSYCHIATRIC: Denies depression, anxiety or mood swings.    Physical Exam:      PHYSICAL EXAM:  /76   Ht 5' 7" (1.702 m)   Wt 66.8 kg (147 lb 4.3 oz)   LMP 2020 (Exact Date)   BMI 23.07 kg/m²   Body mass index is 23.07 kg/m².     APPEARANCE: Well nourished, well developed, in no acute distress.  PSYCH: Appropriate mood and affect.  SKIN: No acne or hirsutism.  NECK: Neck symmetric without masses or thyromegaly.  NODES: No inguinal, axillary, or supraclavicular lymph node enlargement.  CHEST: Normal respiratory effort.    CARDIOVASCULAR:  Regular rate and rhythm.  BREASTS: Symmetrical, no skin changes or visible lesions.  L breast with 1 cm lump palpated at 2 o clock approximately 1 cm from areola.  No other palpable masses or nipple discharge bilaterally.  Implants present.  ABDOMEN: Soft.  No tenderness or masses.    PELVIC: Normal external genitalia without lesions.  Normal hair distribution.  Adequate perineal body, normal urethral meatus.  Vagina moist and well rugated without lesions or discharge.  Cervix pink, without lesions, discharge or tenderness.  No significant cystocele or rectocele.  Bimanual exam shows uterus to be normal size, regular, mobile and nontender.  Adnexa without masses or tenderness.    EXTREMITIES: No edema.  No tenderness to palpation.      Assessment/Plan:     49 y.o.     Screening for HPV (human papillomavirus)  -     HPV High Risk Genotypes, " PCR    Screening for cervical cancer  -     Liquid-Based Pap Smear, Screening    Insomnia, unspecified type    Painful menstrual periods  -     US Pelvis Comp with Transvag NON-OB (xpd; Future; Expected date: 2020  -     US Breast Left Complete; Future; Expected date: 2020    Left breast lump  -     US Breast Left Complete; Future; Expected date: 2020    Well woman exam    Other orders  -     valACYclovir (VALTREX) 1000 MG tablet; Take 1 tablet (1,000 mg total) by mouth once daily.  Dispense: 90 tablet; Refill: 4          Counselin.  Annual exam performed today without difficulty.  Patient was counseled today on current ASCCP pap guidelines, the recommendation for yearly pelvic exams, healthy diet and exercise routines, annual mammograms.  Will order breast U/S for breast lump.  Reviewed guidelines for breast cancer screening with patient again.  R/B/A reviewed and all questions answered.  She declines breast cancer screening at this time.  Understands clinical exam may not detect all breast cancer.  All questions answered.  Pap smear collected.    2.  Dysmenorrhea:  U/S ordered again to evaluate.  Recommend we investigate the etiology of her pain come up with a treatment plan for her pain.  Patient would like a referral for pain management as well.  All questions answered.  She will call if pain returns prior to U/S.    3.  Follow up with PCP for other health maintenance.  4.  RTC in 1 week for U/S or sooner if needed.    Use of the [employer] Patient Portal discussed and encouraged during today's visit.

## 2020-07-17 ENCOUNTER — TELEPHONE (OUTPATIENT)
Dept: OBSTETRICS AND GYNECOLOGY | Facility: CLINIC | Age: 50
End: 2020-07-17

## 2020-07-17 DIAGNOSIS — N63.20 LEFT BREAST LUMP: Primary | ICD-10-CM

## 2020-07-18 LAB
HPV HR 12 DNA SPEC QL NAA+PROBE: NEGATIVE
HPV16 AG SPEC QL: NEGATIVE
HPV18 DNA SPEC QL NAA+PROBE: NEGATIVE

## 2020-07-22 ENCOUNTER — TELEPHONE (OUTPATIENT)
Dept: OBSTETRICS AND GYNECOLOGY | Facility: CLINIC | Age: 50
End: 2020-07-22

## 2020-07-22 LAB
FINAL PATHOLOGIC DIAGNOSIS: NORMAL
Lab: NORMAL

## 2020-07-22 NOTE — TELEPHONE ENCOUNTER
----- Message from Linda Lopez MD sent at 7/22/2020  2:10 PM CDT -----  Please call patient:      Your pap smear is back and everything looks completely normal. This is great news! Based on current guidelines you don't need another pap smear for 5 years. We do still recommend that you come back to clinic each year for your annual exam. This gives us a chance to make sure you're doing well and no problems have developed since your last visit.  Please let me know if you have any questions or concerns.      Happy Birthday!  Thanks so much!

## 2020-07-22 NOTE — TELEPHONE ENCOUNTER
Patient informed of normal test results and recommendations. Instruct the patient to follow up in one year for annual exam per Dr. Lopez.

## 2020-09-04 ENCOUNTER — TELEPHONE (OUTPATIENT)
Dept: OBSTETRICS AND GYNECOLOGY | Facility: CLINIC | Age: 50
End: 2020-09-04

## 2020-09-04 NOTE — TELEPHONE ENCOUNTER
Angelo pt, says she had vaginal u/s done last Monday at [facility] on [address] and wanted to know if MD received the results yet? Please call pt and advise, thank you.     Routing comment        Informed pt Dr. Lopez has her report and she will give her a call with results once clinic is over.  Pt states she will be unavailable after 3pm today so if its after that next week will be fine.

## 2020-09-08 RX ORDER — PROGESTERONE 100 MG/1
100 CAPSULE ORAL DAILY
Qty: 30 CAPSULE | Refills: 11 | Status: SHIPPED | OUTPATIENT
Start: 2020-09-08 | End: 2020-12-15

## 2020-09-08 RX ORDER — TRAMADOL HYDROCHLORIDE 50 MG/1
TABLET ORAL
Qty: 20 TABLET | Refills: 0 | Status: SHIPPED | OUTPATIENT
Start: 2020-09-08 | End: 2022-12-06

## 2020-09-08 NOTE — TELEPHONE ENCOUNTER
Spoke with patient and all questions answered.  U/S results reviewed.  2 small fibroids measuring < 2 cm.  L cyst with 2 cm simple cyst.  Uterus and ovaries otherwise normal.  Patient with continued dysmenorrhea and concerns for endometriosis.  She is a smoker.  Reviewed treatment options with patient.  Is interested in trying progesterone.  Will send over.  She also needs refill of tramadol.  Will send x 1.  All questions answered.  Will follow up in 3 months or sooner if needed.

## 2020-09-09 ENCOUNTER — TELEPHONE (OUTPATIENT)
Dept: OBSTETRICS AND GYNECOLOGY | Facility: CLINIC | Age: 50
End: 2020-09-09

## 2020-09-09 NOTE — TELEPHONE ENCOUNTER
Called for prior authorization. Case # [id number] has been denied for the rx. More details as to why rx has been denied will be faxed.

## 2020-09-10 DIAGNOSIS — G43.909 MIGRAINE WITHOUT STATUS MIGRAINOSUS, NOT INTRACTABLE, UNSPECIFIED MIGRAINE TYPE: Primary | ICD-10-CM

## 2020-09-10 DIAGNOSIS — G43.909 MIGRAINE WITHOUT STATUS MIGRAINOSUS, NOT INTRACTABLE, UNSPECIFIED MIGRAINE TYPE: ICD-10-CM

## 2020-09-10 NOTE — TELEPHONE ENCOUNTER
Spoke with the pt. Picked up rx. Paid cash.     Also states a rx for Orilissa was supposed to be called in. Would like to know if rx can be sent.

## 2020-09-10 NOTE — TELEPHONE ENCOUNTER
Spoke with patient and clarified.  She would like to start the progesterone.  She will call if symptoms do not improve.  Will follow up in 3 months.

## 2020-12-15 ENCOUNTER — TELEPHONE (OUTPATIENT)
Dept: OBSTETRICS AND GYNECOLOGY | Facility: CLINIC | Age: 50
End: 2020-12-15

## 2020-12-15 DIAGNOSIS — R10.2 PELVIC PAIN: Primary | ICD-10-CM

## 2020-12-15 RX ORDER — PROGESTERONE 200 MG/1
200 CAPSULE ORAL NIGHTLY
Qty: 30 CAPSULE | Refills: 6 | Status: SHIPPED | OUTPATIENT
Start: 2020-12-15 | End: 2021-09-30

## 2020-12-15 NOTE — TELEPHONE ENCOUNTER
Sure.  We can increase to 200 mg of prometrium nightly.  Will not stop the cyst from growing but hopefully will continue to give her relief from the pain.  Glad to hear she did not have any pain with intercourse either.  Please let me know if she has any other questions.  Thanks!

## 2020-12-15 NOTE — TELEPHONE ENCOUNTER
"Advised per Dr. Lopez.    Pt was thankful you increased the dose but just upset and frustrated in general about not being able to do anything with this cyst.  She wouldn't even mind "sticking a needle in it to drain it".  "

## 2020-12-15 NOTE — TELEPHONE ENCOUNTER
Pt had a period 11/8, was supposed to have one Dec 4 but has not gotten it yet.      Pt currently having a lot of Left pelvic pain.  H/o L cyst. Pt reported no sex drive and no appetite before.  But when pt first started on 100mg progesterone, pt felt much better.  Sex drive increased temporarily, still no appetite.  Pt requesting if it can be increased to 200mg.  She likes the sedative-effect of progesterone.  Was wondering if increasing the dosage would stop the cyst from growing.    Also, pt would like for you to know that she did not have pain during intercourse which may be a sign that she does not have endometriosis.    Allergies and Pharmacy UTD    Please advise, thanks!

## 2020-12-15 NOTE — TELEPHONE ENCOUNTER
Ok.  I think it is probably a good idea to do a follow up ultrasound to see if the cyst is still there in the next 3 months.  Can we schedule her for that?  I believe she likes to do DIS.  Thank you!

## 2020-12-16 NOTE — TELEPHONE ENCOUNTER
Notified pt that an US order will be sent to [facility].  Advised that once she schedules an appt with them, to call the office back to schedule an appt with Dr. Lopez a week after to go over results.  Pt verbalized understanding.

## 2020-12-17 ENCOUNTER — TELEPHONE (OUTPATIENT)
Dept: OBSTETRICS AND GYNECOLOGY | Facility: CLINIC | Age: 50
End: 2020-12-17

## 2020-12-17 NOTE — TELEPHONE ENCOUNTER
Spoke with pt to clarify which location she wanted to have the order sent to.  Pt stated she's been having her US done at the [facility] on [address] because she does not drive and she lives near that office.  Pt states she will schedule an appt in the next day or two.

## 2021-01-06 DIAGNOSIS — U07.1 COVID-19: Primary | ICD-10-CM

## 2021-01-12 LAB — SARS-COV-2 IGG SERPL QL IA: NEGATIVE

## 2021-01-28 DIAGNOSIS — G43.909 MIGRAINE WITHOUT STATUS MIGRAINOSUS, NOT INTRACTABLE, UNSPECIFIED MIGRAINE TYPE: ICD-10-CM

## 2021-03-30 DIAGNOSIS — G43.909 MIGRAINE WITHOUT STATUS MIGRAINOSUS, NOT INTRACTABLE, UNSPECIFIED MIGRAINE TYPE: ICD-10-CM

## 2021-03-30 RX ORDER — BUTALBITAL, ACETAMINOPHEN AND CAFFEINE 50; 325; 40 MG/1; MG/1; MG/1
1 TABLET ORAL DAILY PRN
Qty: 20 TABLET | Refills: 0 | Status: SHIPPED | OUTPATIENT
Start: 2021-03-30 | End: 2022-02-22

## 2021-06-07 ENCOUNTER — TELEPHONE (OUTPATIENT)
Dept: OBSTETRICS AND GYNECOLOGY | Facility: CLINIC | Age: 51
End: 2021-06-07

## 2021-09-30 ENCOUNTER — OFFICE VISIT (OUTPATIENT)
Dept: OBSTETRICS AND GYNECOLOGY | Facility: CLINIC | Age: 51
End: 2021-09-30
Attending: STUDENT IN AN ORGANIZED HEALTH CARE EDUCATION/TRAINING PROGRAM
Payer: COMMERCIAL

## 2021-09-30 VITALS
DIASTOLIC BLOOD PRESSURE: 70 MMHG | BODY MASS INDEX: 22.61 KG/M2 | HEIGHT: 67 IN | SYSTOLIC BLOOD PRESSURE: 110 MMHG | WEIGHT: 144.06 LBS

## 2021-09-30 DIAGNOSIS — Z01.419 WELL WOMAN EXAM: Primary | ICD-10-CM

## 2021-09-30 PROCEDURE — 99396 PR PREVENTIVE VISIT,EST,40-64: ICD-10-PCS | Mod: S$GLB,,, | Performed by: STUDENT IN AN ORGANIZED HEALTH CARE EDUCATION/TRAINING PROGRAM

## 2021-09-30 PROCEDURE — 99999 PR PBB SHADOW E&M-EST. PATIENT-LVL III: CPT | Mod: PBBFAC,,, | Performed by: STUDENT IN AN ORGANIZED HEALTH CARE EDUCATION/TRAINING PROGRAM

## 2021-09-30 PROCEDURE — 3008F BODY MASS INDEX DOCD: CPT | Mod: CPTII,S$GLB,, | Performed by: STUDENT IN AN ORGANIZED HEALTH CARE EDUCATION/TRAINING PROGRAM

## 2021-09-30 PROCEDURE — 1159F MED LIST DOCD IN RCRD: CPT | Mod: CPTII,S$GLB,, | Performed by: STUDENT IN AN ORGANIZED HEALTH CARE EDUCATION/TRAINING PROGRAM

## 2021-09-30 PROCEDURE — 99999 PR PBB SHADOW E&M-EST. PATIENT-LVL III: ICD-10-PCS | Mod: PBBFAC,,, | Performed by: STUDENT IN AN ORGANIZED HEALTH CARE EDUCATION/TRAINING PROGRAM

## 2021-09-30 PROCEDURE — 3074F SYST BP LT 130 MM HG: CPT | Mod: CPTII,S$GLB,, | Performed by: STUDENT IN AN ORGANIZED HEALTH CARE EDUCATION/TRAINING PROGRAM

## 2021-09-30 PROCEDURE — 3074F PR MOST RECENT SYSTOLIC BLOOD PRESSURE < 130 MM HG: ICD-10-PCS | Mod: CPTII,S$GLB,, | Performed by: STUDENT IN AN ORGANIZED HEALTH CARE EDUCATION/TRAINING PROGRAM

## 2021-09-30 PROCEDURE — 1159F PR MEDICATION LIST DOCUMENTED IN MEDICAL RECORD: ICD-10-PCS | Mod: CPTII,S$GLB,, | Performed by: STUDENT IN AN ORGANIZED HEALTH CARE EDUCATION/TRAINING PROGRAM

## 2021-09-30 PROCEDURE — 3078F PR MOST RECENT DIASTOLIC BLOOD PRESSURE < 80 MM HG: ICD-10-PCS | Mod: CPTII,S$GLB,, | Performed by: STUDENT IN AN ORGANIZED HEALTH CARE EDUCATION/TRAINING PROGRAM

## 2021-09-30 PROCEDURE — 3078F DIAST BP <80 MM HG: CPT | Mod: CPTII,S$GLB,, | Performed by: STUDENT IN AN ORGANIZED HEALTH CARE EDUCATION/TRAINING PROGRAM

## 2021-09-30 PROCEDURE — 99396 PREV VISIT EST AGE 40-64: CPT | Mod: S$GLB,,, | Performed by: STUDENT IN AN ORGANIZED HEALTH CARE EDUCATION/TRAINING PROGRAM

## 2021-09-30 PROCEDURE — 3008F PR BODY MASS INDEX (BMI) DOCUMENTED: ICD-10-PCS | Mod: CPTII,S$GLB,, | Performed by: STUDENT IN AN ORGANIZED HEALTH CARE EDUCATION/TRAINING PROGRAM

## 2021-09-30 RX ORDER — PROGESTERONE 100 MG/1
100 CAPSULE ORAL NIGHTLY
COMMUNITY
Start: 2021-07-03 | End: 2021-09-30 | Stop reason: SDUPTHER

## 2021-09-30 RX ORDER — PROGESTERONE 100 MG/1
100 CAPSULE ORAL NIGHTLY
Qty: 90 CAPSULE | Refills: 3 | Status: SHIPPED | OUTPATIENT
Start: 2021-09-30 | End: 2022-12-06

## 2021-11-29 ENCOUNTER — LAB VISIT (OUTPATIENT)
Dept: LAB | Facility: OTHER | Age: 51
End: 2021-11-29
Attending: INTERNAL MEDICINE
Payer: COMMERCIAL

## 2021-11-29 ENCOUNTER — OFFICE VISIT (OUTPATIENT)
Dept: INTERNAL MEDICINE | Facility: CLINIC | Age: 51
End: 2021-11-29
Attending: INTERNAL MEDICINE
Payer: COMMERCIAL

## 2021-11-29 VITALS
WEIGHT: 146 LBS | BODY MASS INDEX: 22.91 KG/M2 | OXYGEN SATURATION: 96 % | HEART RATE: 101 BPM | DIASTOLIC BLOOD PRESSURE: 64 MMHG | SYSTOLIC BLOOD PRESSURE: 120 MMHG | HEIGHT: 67 IN

## 2021-11-29 DIAGNOSIS — R79.89 OTHER SPECIFIED ABNORMAL FINDINGS OF BLOOD CHEMISTRY: ICD-10-CM

## 2021-11-29 DIAGNOSIS — E55.9 VITAMIN D DEFICIENCY: ICD-10-CM

## 2021-11-29 DIAGNOSIS — Z00.00 ROUTINE ADULT HEALTH MAINTENANCE: ICD-10-CM

## 2021-11-29 DIAGNOSIS — Z12.11 COLON CANCER SCREENING: ICD-10-CM

## 2021-11-29 DIAGNOSIS — D50.8 OTHER IRON DEFICIENCY ANEMIA: ICD-10-CM

## 2021-11-29 DIAGNOSIS — E03.9 HYPOTHYROIDISM, UNSPECIFIED TYPE: ICD-10-CM

## 2021-11-29 DIAGNOSIS — K73.9 CHRONIC HEPATITIS, UNSPECIFIED: ICD-10-CM

## 2021-11-29 DIAGNOSIS — E78.9 DISORDER OF LIPID METABOLISM: ICD-10-CM

## 2021-11-29 DIAGNOSIS — B19.20 HEPATITIS C VIRUS INFECTION WITHOUT HEPATIC COMA, UNSPECIFIED CHRONICITY: ICD-10-CM

## 2021-11-29 DIAGNOSIS — K73.9 CHRONIC HEPATITIS, UNSPECIFIED: Primary | ICD-10-CM

## 2021-11-29 DIAGNOSIS — G43.909 MIGRAINE WITHOUT STATUS MIGRAINOSUS, NOT INTRACTABLE, UNSPECIFIED MIGRAINE TYPE: Primary | ICD-10-CM

## 2021-11-29 DIAGNOSIS — D51.0 PERNICIOUS ANEMIA: ICD-10-CM

## 2021-11-29 LAB
25(OH)D3+25(OH)D2 SERPL-MCNC: 49 NG/ML (ref 30–96)
AFP SERPL-MCNC: 4.4 NG/ML (ref 0–8.4)
ALBUMIN SERPL BCP-MCNC: 4.5 G/DL (ref 3.5–5.2)
ALP SERPL-CCNC: 86 U/L (ref 55–135)
ALT SERPL W/O P-5'-P-CCNC: 24 U/L (ref 10–44)
ANION GAP SERPL CALC-SCNC: 12 MMOL/L (ref 8–16)
AST SERPL-CCNC: 32 U/L (ref 10–40)
BASOPHILS # BLD AUTO: 0.07 K/UL (ref 0–0.2)
BASOPHILS NFR BLD: 0.5 % (ref 0–1.9)
BILIRUB SERPL-MCNC: 0.6 MG/DL (ref 0.1–1)
BUN SERPL-MCNC: 11 MG/DL (ref 6–20)
CALCIUM SERPL-MCNC: 10.1 MG/DL (ref 8.7–10.5)
CHLORIDE SERPL-SCNC: 101 MMOL/L (ref 95–110)
CHOLEST SERPL-MCNC: 272 MG/DL (ref 120–199)
CHOLEST/HDLC SERPL: 3.5 {RATIO} (ref 2–5)
CO2 SERPL-SCNC: 27 MMOL/L (ref 23–29)
CREAT SERPL-MCNC: 0.8 MG/DL (ref 0.5–1.4)
DIFFERENTIAL METHOD: ABNORMAL
EOSINOPHIL # BLD AUTO: 0.2 K/UL (ref 0–0.5)
EOSINOPHIL NFR BLD: 1.2 % (ref 0–8)
ERYTHROCYTE [DISTWIDTH] IN BLOOD BY AUTOMATED COUNT: 12.5 % (ref 11.5–14.5)
EST. GFR  (AFRICAN AMERICAN): >60 ML/MIN/1.73 M^2
EST. GFR  (NON AFRICAN AMERICAN): >60 ML/MIN/1.73 M^2
ESTIMATED AVG GLUCOSE: 105 MG/DL (ref 68–131)
GLUCOSE SERPL-MCNC: 98 MG/DL (ref 70–110)
HBA1C MFR BLD: 5.3 % (ref 4–5.6)
HCT VFR BLD AUTO: 44.6 % (ref 37–48.5)
HDLC SERPL-MCNC: 78 MG/DL (ref 40–75)
HDLC SERPL: 28.7 % (ref 20–50)
HGB BLD-MCNC: 14.7 G/DL (ref 12–16)
IMM GRANULOCYTES # BLD AUTO: 0.09 K/UL (ref 0–0.04)
IMM GRANULOCYTES NFR BLD AUTO: 0.7 % (ref 0–0.5)
INR PPP: 1 (ref 0.8–1.2)
LDLC SERPL CALC-MCNC: 174.6 MG/DL (ref 63–159)
LYMPHOCYTES # BLD AUTO: 2.2 K/UL (ref 1–4.8)
LYMPHOCYTES NFR BLD: 15.7 % (ref 18–48)
MCH RBC QN AUTO: 32.9 PG (ref 27–31)
MCHC RBC AUTO-ENTMCNC: 33 G/DL (ref 32–36)
MCV RBC AUTO: 100 FL (ref 82–98)
MONOCYTES # BLD AUTO: 0.8 K/UL (ref 0.3–1)
MONOCYTES NFR BLD: 5.8 % (ref 4–15)
NEUTROPHILS # BLD AUTO: 10.5 K/UL (ref 1.8–7.7)
NEUTROPHILS NFR BLD: 76.1 % (ref 38–73)
NONHDLC SERPL-MCNC: 194 MG/DL
NRBC BLD-RTO: 0 /100 WBC
PLATELET # BLD AUTO: 284 K/UL (ref 150–450)
PMV BLD AUTO: 9.9 FL (ref 9.2–12.9)
POTASSIUM SERPL-SCNC: 4.6 MMOL/L (ref 3.5–5.1)
PROT SERPL-MCNC: 7.7 G/DL (ref 6–8.4)
PROTHROMBIN TIME: 10.5 SEC (ref 9–12.5)
RBC # BLD AUTO: 4.47 M/UL (ref 4–5.4)
SODIUM SERPL-SCNC: 140 MMOL/L (ref 136–145)
TRIGL SERPL-MCNC: 97 MG/DL (ref 30–150)
TSH SERPL DL<=0.005 MIU/L-ACNC: 1.11 UIU/ML (ref 0.4–4)
VIT B12 SERPL-MCNC: 441 PG/ML (ref 210–950)
WBC # BLD AUTO: 13.72 K/UL (ref 3.9–12.7)

## 2021-11-29 PROCEDURE — 82105 ALPHA-FETOPROTEIN SERUM: CPT | Performed by: INTERNAL MEDICINE

## 2021-11-29 PROCEDURE — 36415 COLL VENOUS BLD VENIPUNCTURE: CPT | Performed by: INTERNAL MEDICINE

## 2021-11-29 PROCEDURE — 99214 PR OFFICE/OUTPT VISIT, EST, LEVL IV, 30-39 MIN: ICD-10-PCS | Mod: S$GLB,,, | Performed by: INTERNAL MEDICINE

## 2021-11-29 PROCEDURE — 82306 VITAMIN D 25 HYDROXY: CPT | Performed by: INTERNAL MEDICINE

## 2021-11-29 PROCEDURE — 83036 HEMOGLOBIN GLYCOSYLATED A1C: CPT | Performed by: INTERNAL MEDICINE

## 2021-11-29 PROCEDURE — 87522 HEPATITIS C REVRS TRNSCRPJ: CPT | Performed by: INTERNAL MEDICINE

## 2021-11-29 PROCEDURE — 80061 LIPID PANEL: CPT | Performed by: INTERNAL MEDICINE

## 2021-11-29 PROCEDURE — 82607 VITAMIN B-12: CPT | Performed by: INTERNAL MEDICINE

## 2021-11-29 PROCEDURE — 99214 OFFICE O/P EST MOD 30 MIN: CPT | Mod: S$GLB,,, | Performed by: INTERNAL MEDICINE

## 2021-11-29 PROCEDURE — 85025 COMPLETE CBC W/AUTO DIFF WBC: CPT | Performed by: INTERNAL MEDICINE

## 2021-11-29 PROCEDURE — 80053 COMPREHEN METABOLIC PANEL: CPT | Performed by: INTERNAL MEDICINE

## 2021-11-29 PROCEDURE — 85610 PROTHROMBIN TIME: CPT | Performed by: INTERNAL MEDICINE

## 2021-11-29 PROCEDURE — 84443 ASSAY THYROID STIM HORMONE: CPT | Performed by: INTERNAL MEDICINE

## 2021-11-29 RX ORDER — GALCANEZUMAB 120 MG/ML
INJECTION, SOLUTION SUBCUTANEOUS
Qty: 3 ML | Refills: 3 | Status: SHIPPED | OUTPATIENT
Start: 2021-11-29 | End: 2022-01-24

## 2021-11-29 RX ORDER — HYOSCYAMINE SULFATE 0.12 MG/1
0.12 TABLET SUBLINGUAL EVERY 6 HOURS PRN
Qty: 60 TABLET | Refills: 1 | Status: SHIPPED | OUTPATIENT
Start: 2021-11-29 | End: 2022-12-06

## 2021-12-01 LAB — HEPATITIS C VIRUS (HCV) RNA DETECTION/QUANTIFICATION RT-PCR: NORMAL IU/ML

## 2021-12-02 RX ORDER — CIPROFLOXACIN 500 MG/1
500 TABLET ORAL 2 TIMES DAILY
Qty: 20 TABLET | Refills: 0 | Status: SHIPPED | OUTPATIENT
Start: 2021-12-02 | End: 2021-12-12

## 2021-12-08 ENCOUNTER — HOSPITAL ENCOUNTER (OUTPATIENT)
Dept: RADIOLOGY | Facility: OTHER | Age: 51
Discharge: HOME | End: 2021-12-08
Attending: STUDENT IN AN ORGANIZED HEALTH CARE EDUCATION/TRAINING PROGRAM
Payer: COMMERCIAL

## 2021-12-08 DIAGNOSIS — R10.2 PELVIC PAIN: ICD-10-CM

## 2021-12-08 PROCEDURE — 76830 TRANSVAGINAL US NON-OB: CPT | Mod: 26,,, | Performed by: RADIOLOGY

## 2021-12-08 PROCEDURE — 76856 US EXAM PELVIC COMPLETE: CPT | Mod: 26,,, | Performed by: RADIOLOGY

## 2021-12-08 PROCEDURE — 76856 US PELVIS COMP WITH TRANSVAG NON-OB (XPD): ICD-10-PCS | Mod: 26,,, | Performed by: RADIOLOGY

## 2021-12-08 PROCEDURE — 76856 US EXAM PELVIC COMPLETE: CPT | Mod: TC

## 2021-12-08 PROCEDURE — 76830 US PELVIS COMP WITH TRANSVAG NON-OB (XPD): ICD-10-PCS | Mod: 26,,, | Performed by: RADIOLOGY

## 2021-12-09 ENCOUNTER — TELEPHONE (OUTPATIENT)
Dept: OBSTETRICS AND GYNECOLOGY | Facility: CLINIC | Age: 51
End: 2021-12-09
Payer: COMMERCIAL

## 2021-12-29 ENCOUNTER — TELEPHONE (OUTPATIENT)
Dept: OBSTETRICS AND GYNECOLOGY | Facility: CLINIC | Age: 51
End: 2021-12-29
Payer: COMMERCIAL

## 2021-12-29 DIAGNOSIS — R10.2 PELVIC PAIN IN FEMALE: Primary | ICD-10-CM

## 2022-01-03 NOTE — TELEPHONE ENCOUNTER
Lets go ahead and schedule it in 6 weeks for follow up but if anything worsens she can call us sooner.  Thank you!

## 2022-01-03 NOTE — TELEPHONE ENCOUNTER
"Pt states this is not new pain. She has had this pain for 3 years, always on the "left ovary".  She fired her last OBGYN because they couldn't figure out what was wrong.  She is aware pelvic US is normal but is convinced this is an ovary problem.  Coloscopy on 12/24 was normal and she is not being treated for diverticulitis.  When this pain starts it "festers" for a while, then will become 10/10 pain.  Advised you were recommending seeing PCP since pelvic US was normal.  She will call us back when she starts getting dizzy and faint because this will indicate her WBC are high and she needs antibiotics.  Said its not a colon problem and not endometriosis, and its probably an infection or abscess on her ovary.        "

## 2022-01-03 NOTE — TELEPHONE ENCOUNTER
Ok that is fine.  We can set her up for a follow up U/S in the next 6 weeks or sooner if needed.  Thank you

## 2022-01-03 NOTE — TELEPHONE ENCOUNTER
Spoke with the patient. She reports severe left-sided pelvic pain with fainting and dizziness. Went to PCP and had labs done. Was given rx for antibiotics, but had some relief after taking medication. Had colonoscopy and everything was fine.  Concerned that it may be her left ovary. Would like to have a CBC in one month.

## 2022-01-03 NOTE — TELEPHONE ENCOUNTER
Please call patient.  We did an U/S in December that was normal.  Is she having new pain?  Have the antibiotics helped?  I believe she was being treated for diverticulitis.  I would have her follow up with her PCP for the follow up CBC.

## 2022-02-10 ENCOUNTER — HOSPITAL ENCOUNTER (OUTPATIENT)
Dept: RADIOLOGY | Facility: OTHER | Age: 52
Discharge: HOME | End: 2022-02-10
Attending: INTERNAL MEDICINE
Payer: COMMERCIAL

## 2022-02-10 DIAGNOSIS — R14.0 BLOATING: ICD-10-CM

## 2022-02-10 DIAGNOSIS — R10.32 ABDOMINAL PAIN, LEFT LOWER QUADRANT: ICD-10-CM

## 2022-02-10 DIAGNOSIS — R11.0 NAUSEA: ICD-10-CM

## 2022-02-10 DIAGNOSIS — D72.829 LEUKOCYTOSIS, UNSPECIFIED TYPE: Primary | ICD-10-CM

## 2022-02-10 DIAGNOSIS — Z12.11 SCREENING FOR MALIGNANT NEOPLASM OF COLON: ICD-10-CM

## 2022-02-10 DIAGNOSIS — R10.12 ABDOMINAL PAIN, LEFT UPPER QUADRANT: ICD-10-CM

## 2022-02-10 PROCEDURE — 74177 CT ABD & PELVIS W/CONTRAST: CPT | Mod: TC

## 2022-02-10 PROCEDURE — 25500020 PHARM REV CODE 255: Performed by: INTERNAL MEDICINE

## 2022-02-10 PROCEDURE — 74177 CT ABD & PELVIS W/CONTRAST: CPT | Mod: 26,,, | Performed by: RADIOLOGY

## 2022-02-10 PROCEDURE — 74177 CT ABDOMEN PELVIS WITH CONTRAST: ICD-10-PCS | Mod: 26,,, | Performed by: RADIOLOGY

## 2022-02-10 PROCEDURE — A9698 NON-RAD CONTRAST MATERIALNOC: HCPCS | Performed by: INTERNAL MEDICINE

## 2022-02-10 RX ADMIN — IOHEXOL 1000 ML: 9 SOLUTION ORAL at 07:02

## 2022-02-10 RX ADMIN — IOHEXOL 75 ML: 350 INJECTION, SOLUTION INTRAVENOUS at 08:02

## 2022-02-11 ENCOUNTER — TELEPHONE (OUTPATIENT)
Dept: OBSTETRICS AND GYNECOLOGY | Facility: CLINIC | Age: 52
End: 2022-02-11
Payer: COMMERCIAL

## 2022-02-11 NOTE — TELEPHONE ENCOUNTER
Pt states she doesn't think she needs US, had an abdominal CT with contract today.  She has had abd pain for 3 years, she is now Dizzy and faint. Recently took Cipro for an infection, asking if it would have resolved any pelvic infection. Advised it depends what kind of infection she has but Cipro will not work on every infection.  Had labs yesterday at [facility] ordered by Dr. Duke, will have Dr. Duke either sent the results or she will bring them with her to the appt.  Wants to be tested for STD.  instructed her to keep appt on 2/18 for US and Dr. Lopez and request STD testing at [facility].

## 2022-02-12 LAB
BASOPHILS # BLD AUTO: 52 CELLS/UL (ref 0–200)
BASOPHILS NFR BLD AUTO: 0.8 %
EOSINOPHIL # BLD AUTO: 189 CELLS/UL (ref 15–500)
EOSINOPHIL NFR BLD AUTO: 2.9 %
ERYTHROCYTE [DISTWIDTH] IN BLOOD BY AUTOMATED COUNT: 12.6 % (ref 11–15)
HCT VFR BLD AUTO: 42.6 % (ref 35–45)
HGB BLD-MCNC: 14.1 G/DL (ref 11.7–15.5)
LYMPHOCYTES # BLD AUTO: 1963 CELLS/UL (ref 850–3900)
LYMPHOCYTES NFR BLD AUTO: 30.2 %
MCH RBC QN AUTO: 32.8 PG (ref 27–33)
MCHC RBC AUTO-ENTMCNC: 33.1 G/DL (ref 32–36)
MCV RBC AUTO: 99.1 FL (ref 80–100)
MONOCYTES # BLD AUTO: 579 CELLS/UL (ref 200–950)
MONOCYTES NFR BLD AUTO: 8.9 %
NEUTROPHILS # BLD AUTO: 3718 CELLS/UL (ref 1500–7800)
NEUTROPHILS NFR BLD AUTO: 57.2 %
PLATELET # BLD AUTO: 286 THOUSAND/UL (ref 140–400)
PMV BLD REES-ECKER: 11 FL (ref 7.5–12.5)
RBC # BLD AUTO: 4.3 MILLION/UL (ref 3.8–5.1)
WBC # BLD AUTO: 6.5 THOUSAND/UL (ref 3.8–10.8)

## 2022-02-18 ENCOUNTER — OFFICE VISIT (OUTPATIENT)
Dept: OBSTETRICS AND GYNECOLOGY | Facility: CLINIC | Age: 52
End: 2022-02-18
Attending: STUDENT IN AN ORGANIZED HEALTH CARE EDUCATION/TRAINING PROGRAM
Payer: COMMERCIAL

## 2022-02-18 VITALS
SYSTOLIC BLOOD PRESSURE: 122 MMHG | DIASTOLIC BLOOD PRESSURE: 72 MMHG | BODY MASS INDEX: 23.02 KG/M2 | WEIGHT: 146.69 LBS | HEIGHT: 67 IN

## 2022-02-18 DIAGNOSIS — R30.0 BURNING WITH URINATION: Primary | ICD-10-CM

## 2022-02-18 DIAGNOSIS — Z11.3 SCREEN FOR STD (SEXUALLY TRANSMITTED DISEASE): ICD-10-CM

## 2022-02-18 LAB
BILIRUB SERPL-MCNC: NORMAL MG/DL
BLOOD URINE, POC: NORMAL
CLARITY, POC UA: CLEAR
COLOR, POC UA: NORMAL
GLUCOSE UR QL STRIP: NORMAL
KETONES UR QL STRIP: NORMAL
LEUKOCYTE ESTERASE URINE, POC: NORMAL
NITRITE, POC UA: NORMAL
PH, POC UA: NORMAL
PROTEIN, POC: NORMAL
SPECIFIC GRAVITY, POC UA: NORMAL
UROBILINOGEN, POC UA: NORMAL

## 2022-02-18 PROCEDURE — 99999 PR PBB SHADOW E&M-EST. PATIENT-LVL III: CPT | Mod: PBBFAC,,, | Performed by: STUDENT IN AN ORGANIZED HEALTH CARE EDUCATION/TRAINING PROGRAM

## 2022-02-18 PROCEDURE — 3074F PR MOST RECENT SYSTOLIC BLOOD PRESSURE < 130 MM HG: ICD-10-PCS | Mod: CPTII,S$GLB,, | Performed by: STUDENT IN AN ORGANIZED HEALTH CARE EDUCATION/TRAINING PROGRAM

## 2022-02-18 PROCEDURE — 81002 POCT URINE DIPSTICK WITHOUT MICROSCOPE: ICD-10-PCS | Mod: S$GLB,,, | Performed by: STUDENT IN AN ORGANIZED HEALTH CARE EDUCATION/TRAINING PROGRAM

## 2022-02-18 PROCEDURE — 99213 PR OFFICE/OUTPT VISIT, EST, LEVL III, 20-29 MIN: ICD-10-PCS | Mod: S$GLB,,, | Performed by: STUDENT IN AN ORGANIZED HEALTH CARE EDUCATION/TRAINING PROGRAM

## 2022-02-18 PROCEDURE — 87481 CANDIDA DNA AMP PROBE: CPT | Mod: 59 | Performed by: STUDENT IN AN ORGANIZED HEALTH CARE EDUCATION/TRAINING PROGRAM

## 2022-02-18 PROCEDURE — 3008F BODY MASS INDEX DOCD: CPT | Mod: CPTII,S$GLB,, | Performed by: STUDENT IN AN ORGANIZED HEALTH CARE EDUCATION/TRAINING PROGRAM

## 2022-02-18 PROCEDURE — 3008F PR BODY MASS INDEX (BMI) DOCUMENTED: ICD-10-PCS | Mod: CPTII,S$GLB,, | Performed by: STUDENT IN AN ORGANIZED HEALTH CARE EDUCATION/TRAINING PROGRAM

## 2022-02-18 PROCEDURE — 87491 CHLMYD TRACH DNA AMP PROBE: CPT | Performed by: STUDENT IN AN ORGANIZED HEALTH CARE EDUCATION/TRAINING PROGRAM

## 2022-02-18 PROCEDURE — 81002 URINALYSIS NONAUTO W/O SCOPE: CPT | Mod: S$GLB,,, | Performed by: STUDENT IN AN ORGANIZED HEALTH CARE EDUCATION/TRAINING PROGRAM

## 2022-02-18 PROCEDURE — 3074F SYST BP LT 130 MM HG: CPT | Mod: CPTII,S$GLB,, | Performed by: STUDENT IN AN ORGANIZED HEALTH CARE EDUCATION/TRAINING PROGRAM

## 2022-02-18 PROCEDURE — 99213 OFFICE O/P EST LOW 20 MIN: CPT | Mod: S$GLB,,, | Performed by: STUDENT IN AN ORGANIZED HEALTH CARE EDUCATION/TRAINING PROGRAM

## 2022-02-18 PROCEDURE — 3078F PR MOST RECENT DIASTOLIC BLOOD PRESSURE < 80 MM HG: ICD-10-PCS | Mod: CPTII,S$GLB,, | Performed by: STUDENT IN AN ORGANIZED HEALTH CARE EDUCATION/TRAINING PROGRAM

## 2022-02-18 PROCEDURE — 99999 PR PBB SHADOW E&M-EST. PATIENT-LVL III: ICD-10-PCS | Mod: PBBFAC,,, | Performed by: STUDENT IN AN ORGANIZED HEALTH CARE EDUCATION/TRAINING PROGRAM

## 2022-02-18 PROCEDURE — 1159F MED LIST DOCD IN RCRD: CPT | Mod: CPTII,S$GLB,, | Performed by: STUDENT IN AN ORGANIZED HEALTH CARE EDUCATION/TRAINING PROGRAM

## 2022-02-18 PROCEDURE — 87801 DETECT AGNT MULT DNA AMPLI: CPT | Performed by: STUDENT IN AN ORGANIZED HEALTH CARE EDUCATION/TRAINING PROGRAM

## 2022-02-18 PROCEDURE — 3078F DIAST BP <80 MM HG: CPT | Mod: CPTII,S$GLB,, | Performed by: STUDENT IN AN ORGANIZED HEALTH CARE EDUCATION/TRAINING PROGRAM

## 2022-02-18 PROCEDURE — 87591 N.GONORRHOEAE DNA AMP PROB: CPT | Performed by: STUDENT IN AN ORGANIZED HEALTH CARE EDUCATION/TRAINING PROGRAM

## 2022-02-18 PROCEDURE — 1159F PR MEDICATION LIST DOCUMENTED IN MEDICAL RECORD: ICD-10-PCS | Mod: CPTII,S$GLB,, | Performed by: STUDENT IN AN ORGANIZED HEALTH CARE EDUCATION/TRAINING PROGRAM

## 2022-02-22 LAB
C TRACH DNA SPEC QL NAA+PROBE: NOT DETECTED
N GONORRHOEA DNA SPEC QL NAA+PROBE: NOT DETECTED

## 2022-02-23 NOTE — PROGRESS NOTES
Chief Complaint: STD Screening     HPI:      Shoaib Esqueda is a 51 y.o.  who presents today for STD Screening.  LMP: Patient's last menstrual period was 2022 (exact date).  No other issues, problems, or complaints.   OB History        3    Para        Term                AB   3    Living           SAB        IAB   3    Ectopic        Multiple        Live Births   0               Past Medical History:   Diagnosis Date    Hepatitis C      Rx w/ chemo x 3 months.  Current  viral load is zero  Recent IUD (copper)     Migraine 2014    Nasal polyps 2014    PCOS (polycystic ovarian syndrome) 2014    PPD positive 10/27/2012     Past Surgical History:   Procedure Laterality Date    BREAST SURGERY      Implants    SINUS SURGERY      x3     Social History     Socioeconomic History    Marital status: Single    Number of children: 0   Occupational History     Employer: [employer]    Occupation:  for [employer]     Employer: [employer]   Tobacco Use    Smoking status: Current Every Day Smoker     Packs/day: 0.50     Years: 15.00     Pack years: 7.50     Types: Cigarettes    Smokeless tobacco: Never Used   Substance and Sexual Activity    Alcohol use: Yes     Alcohol/week: 4.0 standard drinks     Types: 4 Cans of beer per week    Drug use: No    Sexual activity: Yes     Partners: Male   Social History Narrative    Runs and lifts weights.    Father is a radiologist     Family History   Problem Relation Age of Onset    Cancer Maternal Aunt     Cancer Maternal Uncle     Hypertension Mother     Hypertension Father     Breast cancer Neg Hx     Ovarian cancer Neg Hx        Current Outpatient Medications:     cetirizine (ZYRTEC) 5 MG tablet, Take 5 mg by mouth., Disp: , Rfl:     galcanezumab-gnlm (EMGALITY PEN) 120 mg/mL PnIj, INJECT 120MG INTO SKIN EVERY 28 DAYS, Disp: 4 mL, Rfl: 5    progesterone (PROMETRIUM) 100 MG capsule, Take 1  "capsule (100 mg total) by mouth nightly., Disp: 90 capsule, Rfl: 3    valACYclovir (VALTREX) 1000 MG tablet, TAKE 1 TABLET(1000 MG) BY MOUTH EVERY DAY, Disp: 90 tablet, Rfl: 4    butalbital-acetaminophen-caffeine -40 mg (FIORICET, ESGIC) -40 mg per tablet, TAKE 1 TABLET BY MOUTH ONCE DAILY AS NEEDED FOR HEADACHE, Disp: 20 tablet, Rfl: 0    hyoscyamine (LEVSIN/SL) 0.125 mg Subl, Place 1 tablet (0.125 mg total) under the tongue every 6 (six) hours as needed (abdominal cramping). (Patient not taking: Reported on 2/18/2022), Disp: 60 tablet, Rfl: 1    traMADoL (ULTRAM) 50 mg tablet, Take 1/2 tablet by mouth as needed for pain. (Patient not taking: Reported on 2/18/2022), Disp: 20 tablet, Rfl: 0    ROS:     GENERAL: Denies unintentional weight gain or weight loss. Feeling well overall.   SKIN: Denies rash or lesions.   HEENT: Denies headaches, or vision changes.   CARDIOVASCULAR: Denies palpitations or chest pain.   RESPIRATORY: Denies shortness of breath or dyspnea on exertion.  BREASTS: Denies pain, lumps, or nipple discharge.   ABDOMEN: Denies abdominal pain, constipation, diarrhea, nausea, vomiting, change in appetite.  URINARY: Denies frequency, dysuria, hematuria.  NEUROLOGIC: Denies syncope or weakness.   PSYCHIATRIC: Denies depression, anxiety or mood swings.    Physical Exam:      PHYSICAL EXAM:  /72   Ht 5' 7" (1.702 m)   Wt 66.6 kg (146 lb 11.5 oz)   LMP 02/13/2022 (Exact Date)   BMI 22.98 kg/m²   Body mass index is 22.98 kg/m².     APPEARANCE: Well nourished, well developed, in no acute distress.  PSYCH: Appropriate mood and affect.  SKIN: No acne or hirsutism.  ABDOMEN: Soft.  No tenderness or masses.    PELVIC: Normal external genitalia without lesions.  Normal hair distribution.  Adequate perineal body, normal urethral meatus.  Vagina moist and well rugated without lesions or discharge.  Cervix pink, without lesions, discharge or tenderness.  No significant cystocele or rectocele. "  Bimanual exam shows uterus to be normal size, regular, mobile and nontender.  Adnexa without masses or tenderness.    EXTREMITIES: No edema.  No tenderness to palpation.    Assessment/Plan:     51 y.o.     Burning with urination  -     Vaginosis Screen by DNA Probe  -     C. trachomatis/N. gonorrhoeae by AMP DNA [place]; Cervicovaginal  -     POCT urine dipstick without microscope    Screen for STD (sexually transmitted disease)          Counselin.  STD testing:  Performed today.  Will follow up results.  2.  Follow up with PCP for other health maintenance.  3.  RTC in 6 months for annual or sooner if needed.    Use of the [employer] Patient Portal discussed and encouraged during today's visit.

## 2022-02-26 LAB
BACTERIAL VAGINOSIS DNA: POSITIVE
CANDIDA GLABRATA DNA: NEGATIVE
CANDIDA KRUSEI DNA: NEGATIVE
CANDIDA RRNA VAG QL PROBE: NEGATIVE
T VAGINALIS RRNA GENITAL QL PROBE: NEGATIVE

## 2022-02-28 ENCOUNTER — TELEPHONE (OUTPATIENT)
Dept: OBSTETRICS AND GYNECOLOGY | Facility: CLINIC | Age: 52
End: 2022-02-28
Payer: COMMERCIAL

## 2022-02-28 RX ORDER — METRONIDAZOLE 500 MG/1
500 TABLET ORAL 2 TIMES DAILY
Qty: 14 TABLET | Refills: 0 | Status: SHIPPED | OUTPATIENT
Start: 2022-02-28 | End: 2022-06-06

## 2022-06-06 ENCOUNTER — TELEPHONE (OUTPATIENT)
Dept: OBSTETRICS AND GYNECOLOGY | Facility: CLINIC | Age: 52
End: 2022-06-06
Payer: COMMERCIAL

## 2022-06-06 NOTE — TELEPHONE ENCOUNTER
Woke up yesterday with severe erythema and burning.  Used Monistat yesterday.  Flagyl has been sent over, she will try that and if no improvement will come in for an appt.

## 2022-10-14 ENCOUNTER — OFFICE VISIT (OUTPATIENT)
Dept: OBSTETRICS AND GYNECOLOGY | Facility: CLINIC | Age: 52
End: 2022-10-14
Attending: STUDENT IN AN ORGANIZED HEALTH CARE EDUCATION/TRAINING PROGRAM
Payer: COMMERCIAL

## 2022-10-14 VITALS — BODY MASS INDEX: 22.98 KG/M2 | SYSTOLIC BLOOD PRESSURE: 116 MMHG | HEIGHT: 67 IN | DIASTOLIC BLOOD PRESSURE: 82 MMHG

## 2022-10-14 DIAGNOSIS — N89.8 VAGINAL ODOR: ICD-10-CM

## 2022-10-14 DIAGNOSIS — N89.8 VAGINAL IRRITATION: ICD-10-CM

## 2022-10-14 DIAGNOSIS — Z01.419 WELL WOMAN EXAM: ICD-10-CM

## 2022-10-14 DIAGNOSIS — Z12.31 ENCOUNTER FOR SCREENING MAMMOGRAM FOR BREAST CANCER: Primary | ICD-10-CM

## 2022-10-14 PROCEDURE — 3079F DIAST BP 80-89 MM HG: CPT | Mod: CPTII,S$GLB,, | Performed by: STUDENT IN AN ORGANIZED HEALTH CARE EDUCATION/TRAINING PROGRAM

## 2022-10-14 PROCEDURE — 99999 PR PBB SHADOW E&M-EST. PATIENT-LVL III: CPT | Mod: PBBFAC,,, | Performed by: STUDENT IN AN ORGANIZED HEALTH CARE EDUCATION/TRAINING PROGRAM

## 2022-10-14 PROCEDURE — 99999 PR PBB SHADOW E&M-EST. PATIENT-LVL III: ICD-10-PCS | Mod: PBBFAC,,, | Performed by: STUDENT IN AN ORGANIZED HEALTH CARE EDUCATION/TRAINING PROGRAM

## 2022-10-14 PROCEDURE — 99396 PR PREVENTIVE VISIT,EST,40-64: ICD-10-PCS | Mod: S$GLB,,, | Performed by: STUDENT IN AN ORGANIZED HEALTH CARE EDUCATION/TRAINING PROGRAM

## 2022-10-14 PROCEDURE — 1159F PR MEDICATION LIST DOCUMENTED IN MEDICAL RECORD: ICD-10-PCS | Mod: CPTII,S$GLB,, | Performed by: STUDENT IN AN ORGANIZED HEALTH CARE EDUCATION/TRAINING PROGRAM

## 2022-10-14 PROCEDURE — 3074F SYST BP LT 130 MM HG: CPT | Mod: CPTII,S$GLB,, | Performed by: STUDENT IN AN ORGANIZED HEALTH CARE EDUCATION/TRAINING PROGRAM

## 2022-10-14 PROCEDURE — 81514 NFCT DS BV&VAGINITIS DNA ALG: CPT | Performed by: STUDENT IN AN ORGANIZED HEALTH CARE EDUCATION/TRAINING PROGRAM

## 2022-10-14 PROCEDURE — 87086 URINE CULTURE/COLONY COUNT: CPT | Performed by: STUDENT IN AN ORGANIZED HEALTH CARE EDUCATION/TRAINING PROGRAM

## 2022-10-14 PROCEDURE — 3074F PR MOST RECENT SYSTOLIC BLOOD PRESSURE < 130 MM HG: ICD-10-PCS | Mod: CPTII,S$GLB,, | Performed by: STUDENT IN AN ORGANIZED HEALTH CARE EDUCATION/TRAINING PROGRAM

## 2022-10-14 PROCEDURE — 3079F PR MOST RECENT DIASTOLIC BLOOD PRESSURE 80-89 MM HG: ICD-10-PCS | Mod: CPTII,S$GLB,, | Performed by: STUDENT IN AN ORGANIZED HEALTH CARE EDUCATION/TRAINING PROGRAM

## 2022-10-14 PROCEDURE — 1159F MED LIST DOCD IN RCRD: CPT | Mod: CPTII,S$GLB,, | Performed by: STUDENT IN AN ORGANIZED HEALTH CARE EDUCATION/TRAINING PROGRAM

## 2022-10-14 PROCEDURE — 99396 PREV VISIT EST AGE 40-64: CPT | Mod: S$GLB,,, | Performed by: STUDENT IN AN ORGANIZED HEALTH CARE EDUCATION/TRAINING PROGRAM

## 2022-10-14 RX ORDER — METRONIDAZOLE 7.5 MG/G
1 GEL VAGINAL DAILY
Qty: 7 APPLICATOR | Refills: 0 | Status: SHIPPED | OUTPATIENT
Start: 2022-10-14 | End: 2022-10-21

## 2022-10-14 NOTE — PROGRESS NOTES
Chief Complaint: Well Woman Exam     HPI:      Shoaib Esqueda is a 52 y.o.  who presents today for well woman exam.  LMP: Patient's last menstrual period was 10/01/2022.  Pain improved on prometrium 100 mg nightly.  Came off of it in August since she was feeling tired.  Doing ok without it.  Some vaginal discharge and odor. No other issues, problems, or complaints. Specifically, patient denies abnormal vaginal bleeding, discharge, urinary problems, or changes in appetite. Ms. Esqueda is not currently sexually active. She is currently using no method for contraception. She declines STD screening today.    Previous Pap:  NEM, Negative  Previous Mammogram:  Normal.    Most Recent Dexa: NA  Colonoscopy: UTD last year    OB History          3    Para        Term                AB   3    Living             SAB        IAB   3    Ectopic        Multiple        Live Births   0               Past Medical History:   Diagnosis Date    Hepatitis C      Rx w/ chemo x 3 months.  Current  viral load is zero  Recent IUD (copper)     Migraine 2014    Nasal polyps 2014    PCOS (polycystic ovarian syndrome) 2014    PPD positive 10/27/2012     Past Surgical History:   Procedure Laterality Date    BREAST SURGERY      Implants    SINUS SURGERY      x3     Social History     Socioeconomic History    Marital status: Single    Number of children: 0   Occupational History     Employer: [employer]    Occupation:  for [employer]     Employer: [employer]   Tobacco Use    Smoking status: Every Day     Packs/day: 0.50     Years: 15.00     Pack years: 7.50     Types: Cigarettes    Smokeless tobacco: Never   Substance and Sexual Activity    Alcohol use: Yes     Alcohol/week: 4.0 standard drinks     Types: 4 Cans of beer per week    Drug use: No    Sexual activity: Yes     Partners: Male   Social History Narrative    Runs and lifts weights.    Father is a radiologist      Family History   Problem Relation Age of Onset    Cancer Maternal Aunt     Cancer Maternal Uncle     Hypertension Mother     Hypertension Father     Breast cancer Neg Hx     Ovarian cancer Neg Hx        Current Outpatient Medications:     cetirizine (ZYRTEC) 5 MG tablet, Take 5 mg by mouth., Disp: , Rfl:     galcanezumab-gnlm (EMGALITY PEN) 120 mg/mL PnIj, INJECT 120MG INTO THE SKIN EVERY 28 DAYS, Disp: 1 mL, Rfl: 5    valACYclovir (VALTREX) 1000 MG tablet, TAKE 1 TABLET(1000 MG) BY MOUTH EVERY DAY, Disp: 90 tablet, Rfl: 4    butalbital-acetaminophen-caffeine -40 mg (FIORICET, ESGIC) -40 mg per tablet, TAKE 1 TABLET BY MOUTH ONCE DAILY AS NEEDED FOR HEADACHE (Patient not taking: Reported on 10/14/2022), Disp: 20 tablet, Rfl: 0    hyoscyamine (LEVSIN/SL) 0.125 mg Subl, Place 1 tablet (0.125 mg total) under the tongue every 6 (six) hours as needed (abdominal cramping). (Patient not taking: No sig reported), Disp: 60 tablet, Rfl: 1    metroNIDAZOLE (METROGEL) 0.75 % (37.5mg/5 gram) vaginal gel, Place 1 applicator vaginally once daily. for 7 days, Disp: 7 applicator, Rfl: 0    progesterone (PROMETRIUM) 100 MG capsule, Take 1 capsule (100 mg total) by mouth nightly. (Patient not taking: Reported on 10/14/2022), Disp: 90 capsule, Rfl: 3    traMADoL (ULTRAM) 50 mg tablet, Take 1/2 tablet by mouth as needed for pain. (Patient not taking: No sig reported), Disp: 20 tablet, Rfl: 0    ROS:     GENERAL: Denies unintentional weight gain or weight loss. Feeling well overall.   SKIN: Denies rash or lesions.   HEENT: Denies headaches, or vision changes.   CARDIOVASCULAR: Denies palpitations or chest pain.   RESPIRATORY: Denies shortness of breath or dyspnea on exertion.  BREASTS: Denies pain, lumps, or nipple discharge.   ABDOMEN: Denies abdominal pain, constipation, diarrhea, nausea, vomiting, change in appetite.  URINARY: Denies frequency, dysuria, hematuria.  NEUROLOGIC: Denies syncope or weakness.   PSYCHIATRIC:  "Denies depression, anxiety or mood swings.    Physical Exam:      PHYSICAL EXAM:  /82   Ht 5' 7" (1.702 m)   LMP 10/01/2022   BMI 22.98 kg/m²   Body mass index is 22.98 kg/m².     APPEARANCE: Well nourished, well developed, in no acute distress.  PSYCH: Appropriate mood and affect.  SKIN: No acne or hirsutism.  NECK: Neck symmetric without masses or thyromegaly.  NODES: No inguinal, axillary, or supraclavicular lymph node enlargement.  CHEST: Normal respiratory effort.    CARDIOVASCULAR:  Regular rate and rhythm.  BREASTS: Symmetrical, no skin changes or visible lesions.  No masses.  Implants present.    ABDOMEN: Soft.  No tenderness or masses.    PELVIC: Normal external genitalia without lesions.  Normal hair distribution.  Adequate perineal body, normal urethral meatus.  Vagina moist and well rugated without lesions or discharge.  Cervix pink, without lesions, discharge or tenderness.  No significant cystocele or rectocele.  Bimanual exam shows uterus to be normal size, regular, mobile and nontender.  Adnexa without masses or tenderness.    EXTREMITIES: No edema.  No tenderness to palpation.  +BV    Assessment/Plan:     52 y.o.     Encounter for screening mammogram for breast cancer  -     Mammo Digital Screening Bilat w/ Ag; Future; Expected date: 10/14/2022    Vaginal odor  -     Vaginosis Screen by DNA Probe    Well woman exam    Other orders  -     metroNIDAZOLE (METROGEL) 0.75 % (37.5mg/5 gram) vaginal gel; Place 1 applicator vaginally once daily. for 7 days  Dispense: 7 applicator; Refill: 0        Counselin.  Annual exam performed today without difficulty.  Patient was counseled today on current ASCCP pap guidelines, the recommendation for yearly pelvic exams, healthy diet and exercise routines, annual mammograms.  Pap smear UTD.  Metrogel for BV.  Swab sent.  Reviewed vulvar and perineal care.     2.  Follow up with PCP for other health maintenance.  3.  RTC in 1 year or sooner if " needed.      Use of the [facility] Patient Portal discussed and encouraged during today's visit.

## 2022-10-15 LAB
BACTERIA UR CULT: NORMAL
BACTERIAL VAGINOSIS DNA: NEGATIVE
CANDIDA GLABRATA DNA: NEGATIVE
CANDIDA KRUSEI DNA: NEGATIVE
CANDIDA RRNA VAG QL PROBE: NEGATIVE
T VAGINALIS RRNA GENITAL QL PROBE: NEGATIVE

## 2022-11-21 DIAGNOSIS — G43.909 MIGRAINE WITHOUT STATUS MIGRAINOSUS, NOT INTRACTABLE, UNSPECIFIED MIGRAINE TYPE: ICD-10-CM

## 2022-11-21 RX ORDER — ELETRIPTAN HYDROBROMIDE 40 MG/1
40 TABLET, FILM COATED ORAL
Qty: 15 TABLET | Refills: 1 | Status: SHIPPED | OUTPATIENT
Start: 2022-11-21 | End: 2022-12-05 | Stop reason: SDUPTHER

## 2022-12-05 DIAGNOSIS — G43.909 MIGRAINE WITHOUT STATUS MIGRAINOSUS, NOT INTRACTABLE, UNSPECIFIED MIGRAINE TYPE: ICD-10-CM

## 2022-12-05 RX ORDER — ELETRIPTAN HYDROBROMIDE 40 MG/1
40 TABLET, FILM COATED ORAL DAILY PRN
Qty: 15 TABLET | Refills: 1 | Status: SHIPPED | OUTPATIENT
Start: 2022-12-05 | End: 2023-05-25

## 2022-12-06 ENCOUNTER — OFFICE VISIT (OUTPATIENT)
Dept: INTERNAL MEDICINE | Facility: CLINIC | Age: 52
End: 2022-12-06
Attending: INTERNAL MEDICINE
Payer: COMMERCIAL

## 2022-12-06 ENCOUNTER — LAB VISIT (OUTPATIENT)
Dept: LAB | Facility: OTHER | Age: 52
End: 2022-12-06
Attending: INTERNAL MEDICINE
Payer: COMMERCIAL

## 2022-12-06 VITALS
HEIGHT: 67 IN | HEART RATE: 86 BPM | WEIGHT: 150 LBS | BODY MASS INDEX: 23.54 KG/M2 | TEMPERATURE: 98 F | OXYGEN SATURATION: 97 % | SYSTOLIC BLOOD PRESSURE: 124 MMHG | DIASTOLIC BLOOD PRESSURE: 78 MMHG

## 2022-12-06 DIAGNOSIS — R25.1 TREMOR OF LEFT HAND: ICD-10-CM

## 2022-12-06 DIAGNOSIS — E55.9 VITAMIN D DEFICIENCY: ICD-10-CM

## 2022-12-06 DIAGNOSIS — R79.89 OTHER SPECIFIED ABNORMAL FINDINGS OF BLOOD CHEMISTRY: ICD-10-CM

## 2022-12-06 DIAGNOSIS — D51.0 PERNICIOUS ANEMIA: ICD-10-CM

## 2022-12-06 DIAGNOSIS — E03.9 HYPOTHYROIDISM, UNSPECIFIED TYPE: ICD-10-CM

## 2022-12-06 DIAGNOSIS — G43.909 MIGRAINE WITHOUT STATUS MIGRAINOSUS, NOT INTRACTABLE, UNSPECIFIED MIGRAINE TYPE: Primary | ICD-10-CM

## 2022-12-06 DIAGNOSIS — Z00.00 ROUTINE ADULT HEALTH MAINTENANCE: ICD-10-CM

## 2022-12-06 DIAGNOSIS — E78.9 DISORDER OF LIPID METABOLISM: ICD-10-CM

## 2022-12-06 DIAGNOSIS — B19.20 HEPATITIS C VIRUS INFECTION WITHOUT HEPATIC COMA, UNSPECIFIED CHRONICITY: ICD-10-CM

## 2022-12-06 DIAGNOSIS — G43.909 MIGRAINE WITHOUT STATUS MIGRAINOSUS, NOT INTRACTABLE, UNSPECIFIED MIGRAINE TYPE: ICD-10-CM

## 2022-12-06 DIAGNOSIS — R19.8 TEETH CLENCHING: ICD-10-CM

## 2022-12-06 DIAGNOSIS — D50.8 OTHER IRON DEFICIENCY ANEMIA: ICD-10-CM

## 2022-12-06 DIAGNOSIS — Z00.01 ENCOUNTER FOR GENERAL ADULT MEDICAL EXAMINATION WITH ABNORMAL FINDINGS: ICD-10-CM

## 2022-12-06 DIAGNOSIS — E28.2 PCOS (POLYCYSTIC OVARIAN SYNDROME): ICD-10-CM

## 2022-12-06 DIAGNOSIS — F17.200 SMOKER: ICD-10-CM

## 2022-12-06 LAB
25(OH)D3+25(OH)D2 SERPL-MCNC: 27 NG/ML (ref 30–96)
ALBUMIN SERPL BCP-MCNC: 4.7 G/DL (ref 3.5–5.2)
ALP SERPL-CCNC: 75 U/L (ref 55–135)
ALT SERPL W/O P-5'-P-CCNC: 20 U/L (ref 10–44)
ANION GAP SERPL CALC-SCNC: 11 MMOL/L (ref 8–16)
AST SERPL-CCNC: 24 U/L (ref 10–40)
BASOPHILS # BLD AUTO: 0.06 K/UL (ref 0–0.2)
BASOPHILS NFR BLD: 0.6 % (ref 0–1.9)
BILIRUB SERPL-MCNC: 0.5 MG/DL (ref 0.1–1)
BUN SERPL-MCNC: 12 MG/DL (ref 6–20)
CALCIUM SERPL-MCNC: 10.6 MG/DL (ref 8.7–10.5)
CHLORIDE SERPL-SCNC: 104 MMOL/L (ref 95–110)
CHOLEST SERPL-MCNC: 288 MG/DL (ref 120–199)
CHOLEST/HDLC SERPL: 3.4 {RATIO} (ref 2–5)
CO2 SERPL-SCNC: 26 MMOL/L (ref 23–29)
CREAT SERPL-MCNC: 0.8 MG/DL (ref 0.5–1.4)
DIFFERENTIAL METHOD: ABNORMAL
EOSINOPHIL # BLD AUTO: 0.3 K/UL (ref 0–0.5)
EOSINOPHIL NFR BLD: 2.5 % (ref 0–8)
ERYTHROCYTE [DISTWIDTH] IN BLOOD BY AUTOMATED COUNT: 12.3 % (ref 11.5–14.5)
EST. GFR  (NO RACE VARIABLE): >60 ML/MIN/1.73 M^2
ESTIMATED AVG GLUCOSE: 103 MG/DL (ref 68–131)
GLUCOSE SERPL-MCNC: 114 MG/DL (ref 70–110)
HBA1C MFR BLD: 5.2 % (ref 4–5.6)
HCT VFR BLD AUTO: 46.1 % (ref 37–48.5)
HDLC SERPL-MCNC: 84 MG/DL (ref 40–75)
HDLC SERPL: 29.2 % (ref 20–50)
HGB BLD-MCNC: 15.6 G/DL (ref 12–16)
IMM GRANULOCYTES # BLD AUTO: 0.05 K/UL (ref 0–0.04)
IMM GRANULOCYTES NFR BLD AUTO: 0.5 % (ref 0–0.5)
LDLC SERPL CALC-MCNC: 185.8 MG/DL (ref 63–159)
LYMPHOCYTES # BLD AUTO: 1.9 K/UL (ref 1–4.8)
LYMPHOCYTES NFR BLD: 18.3 % (ref 18–48)
MCH RBC QN AUTO: 33.7 PG (ref 27–31)
MCHC RBC AUTO-ENTMCNC: 33.8 G/DL (ref 32–36)
MCV RBC AUTO: 100 FL (ref 82–98)
MONOCYTES # BLD AUTO: 0.7 K/UL (ref 0.3–1)
MONOCYTES NFR BLD: 6.6 % (ref 4–15)
NEUTROPHILS # BLD AUTO: 7.4 K/UL (ref 1.8–7.7)
NEUTROPHILS NFR BLD: 71.5 % (ref 38–73)
NONHDLC SERPL-MCNC: 204 MG/DL
NRBC BLD-RTO: 0 /100 WBC
PLATELET # BLD AUTO: 288 K/UL (ref 150–450)
PMV BLD AUTO: 10 FL (ref 9.2–12.9)
POTASSIUM SERPL-SCNC: 4.6 MMOL/L (ref 3.5–5.1)
PROT SERPL-MCNC: 8.3 G/DL (ref 6–8.4)
RBC # BLD AUTO: 4.63 M/UL (ref 4–5.4)
SODIUM SERPL-SCNC: 141 MMOL/L (ref 136–145)
TRIGL SERPL-MCNC: 91 MG/DL (ref 30–150)
TSH SERPL DL<=0.005 MIU/L-ACNC: 1.31 UIU/ML (ref 0.4–4)
VIT B12 SERPL-MCNC: 308 PG/ML (ref 210–950)
WBC # BLD AUTO: 10.39 K/UL (ref 3.9–12.7)

## 2022-12-06 PROCEDURE — 1159F PR MEDICATION LIST DOCUMENTED IN MEDICAL RECORD: ICD-10-PCS | Mod: CPTII,S$GLB,, | Performed by: INTERNAL MEDICINE

## 2022-12-06 PROCEDURE — 3078F DIAST BP <80 MM HG: CPT | Mod: CPTII,S$GLB,, | Performed by: INTERNAL MEDICINE

## 2022-12-06 PROCEDURE — 3074F SYST BP LT 130 MM HG: CPT | Mod: CPTII,S$GLB,, | Performed by: INTERNAL MEDICINE

## 2022-12-06 PROCEDURE — 3008F BODY MASS INDEX DOCD: CPT | Mod: CPTII,S$GLB,, | Performed by: INTERNAL MEDICINE

## 2022-12-06 PROCEDURE — 3078F PR MOST RECENT DIASTOLIC BLOOD PRESSURE < 80 MM HG: ICD-10-PCS | Mod: CPTII,S$GLB,, | Performed by: INTERNAL MEDICINE

## 2022-12-06 PROCEDURE — 82607 VITAMIN B-12: CPT | Performed by: INTERNAL MEDICINE

## 2022-12-06 PROCEDURE — 3008F PR BODY MASS INDEX (BMI) DOCUMENTED: ICD-10-PCS | Mod: CPTII,S$GLB,, | Performed by: INTERNAL MEDICINE

## 2022-12-06 PROCEDURE — 3074F PR MOST RECENT SYSTOLIC BLOOD PRESSURE < 130 MM HG: ICD-10-PCS | Mod: CPTII,S$GLB,, | Performed by: INTERNAL MEDICINE

## 2022-12-06 PROCEDURE — 99214 PR OFFICE/OUTPT VISIT, EST, LEVL IV, 30-39 MIN: ICD-10-PCS | Mod: 25,S$GLB,, | Performed by: INTERNAL MEDICINE

## 2022-12-06 PROCEDURE — 1159F MED LIST DOCD IN RCRD: CPT | Mod: CPTII,S$GLB,, | Performed by: INTERNAL MEDICINE

## 2022-12-06 PROCEDURE — 1160F RVW MEDS BY RX/DR IN RCRD: CPT | Mod: CPTII,S$GLB,, | Performed by: INTERNAL MEDICINE

## 2022-12-06 PROCEDURE — 99214 OFFICE O/P EST MOD 30 MIN: CPT | Mod: 25,S$GLB,, | Performed by: INTERNAL MEDICINE

## 2022-12-06 PROCEDURE — 80061 LIPID PANEL: CPT | Performed by: INTERNAL MEDICINE

## 2022-12-06 PROCEDURE — 85025 COMPLETE CBC W/AUTO DIFF WBC: CPT | Performed by: INTERNAL MEDICINE

## 2022-12-06 PROCEDURE — 83036 HEMOGLOBIN GLYCOSYLATED A1C: CPT | Performed by: INTERNAL MEDICINE

## 2022-12-06 PROCEDURE — 99396 PR PREVENTIVE VISIT,EST,40-64: ICD-10-PCS | Mod: S$GLB,,, | Performed by: INTERNAL MEDICINE

## 2022-12-06 PROCEDURE — 36415 COLL VENOUS BLD VENIPUNCTURE: CPT | Performed by: INTERNAL MEDICINE

## 2022-12-06 PROCEDURE — 1160F PR REVIEW ALL MEDS BY PRESCRIBER/CLIN PHARMACIST DOCUMENTED: ICD-10-PCS | Mod: CPTII,S$GLB,, | Performed by: INTERNAL MEDICINE

## 2022-12-06 PROCEDURE — 82306 VITAMIN D 25 HYDROXY: CPT | Performed by: INTERNAL MEDICINE

## 2022-12-06 PROCEDURE — 84443 ASSAY THYROID STIM HORMONE: CPT | Performed by: INTERNAL MEDICINE

## 2022-12-06 PROCEDURE — 80053 COMPREHEN METABOLIC PANEL: CPT | Performed by: INTERNAL MEDICINE

## 2022-12-06 PROCEDURE — 99396 PREV VISIT EST AGE 40-64: CPT | Mod: S$GLB,,, | Performed by: INTERNAL MEDICINE

## 2022-12-06 RX ORDER — CYCLOBENZAPRINE HCL 10 MG
TABLET ORAL
Qty: 15 TABLET | Refills: 0 | Status: SHIPPED | OUTPATIENT
Start: 2022-12-06 | End: 2023-05-25

## 2022-12-06 NOTE — PATIENT INSTRUCTIONS
Use Trudesa nasal spray now.  Take Nurtec now.  Take another Nurtec tomorrow morning if you have any residual headache.      Tips for Healthy Living and Routine Preventative Care - 2022                                                            (These guidelines are intended for healthy adults)      1. Exercise  Exercise aerobically with a target heart rate of (220-age) x 0.8  Exercise 30-45 minutes on most days of the week    2. Diet and Supplements- All supplements can be obtained through a varied, healthy diet   Calcium: 1,000 - 1,200 mg each day   8 oz milk or Calcium fortified O.J. = 300, 8 oz Yogurt = 400 mg, 1 oz of cheese =100-200 mg              8 oz Oatmeal = 215 mg, 3 oz [place] = 240 mg  Vitamin D: 800 iu each day- Can probably be obtained by 30 min. of direct sunlight    each day             3 oz. [place] = 800 iu,  3 oz. Tuna =150 iu, Milk or fortified O.J. = 120 iu  Fish oil: 1-2 grams each day or about 840 mg of EPA and DHA (Omega-3 fatty acids) each day             3 oz. [place]=2 grams,  3 oz. Tuna=1.3 grams,  3 oz. drained light Tuna= 0.25 grams  Folic acid 800 mcg each day for all women planning or capable of pregnancy    3. Lifestyle  Alcohol: 1 drink = 12 oz. domestic beer, 4 oz. wine, or 1 oz. hard (80 proof) liquor             Males: </= 14 drinks per week with no more than 4 in any one day             Females: </= 7 drinks per week with no more than 3 in any one day  Salt: 1.2 - 3 grams of Sodium each day.  Tobacco: Dont smoke, or quit smoking (discuss with your doctor)  Depression: If you feel depressed discuss with your doctor  Weight: Maintain a healthy body weight. Stay within 10% of:             Males: 106 lbs. + 6 lbs per inch height above 5 feet             Females: 100 lbs + 5 lbs per inch height above 5 feet    4. Routine tests  Blood pressure check at each visit, or at least once each year  HIV screening (one time) if less than 65 years old  Hepatitis C screen (one time) if less than  80 years old  Cholesterol screening every 3 years starting at age 21  Glucose/Hemaglobin A1C check every 2-3 years starting at age 45. Check at age 35 if overweight  TSH (thyroid screen) every 2 years starting at age 50  Colonoscopy at age 45, and repeat every 10 years until age 75. Consider screening until age 85. DNA stool test (Cologuard) every 3 years is an acceptable alternative.  Vision screen at age 65    Females:  Gyn exam with cervical HPV test every 3 years or Pap smear every three years starting at age 25                  Stop screening at age 65 if past 3 exams were normal                  No screening for women who have had a hysterectomy with removal of cervix  Mammogram every 1-2 years starting at age 40 (or age 50) until age 75  Consider continuing Mammograms every other year for those older than 75 with a life expectancy of more than 10 years  Bone density scan at about age 65    Males:  PSA screening annually at age 50, age 45 for  Americans, until age 75. Consider annual screening after age 75                   5. Immunizations  Influenza vaccine every year in the fall, especially if >50 or with a chronic disease  Tetanus/Diphtheria/Pertussis (Tdap) vaccine once (after the age of 18), then Tetanus/Diphtheria (Td) or Tdap vaccine every 10 years  Shingles (Shingrix) vaccine after age 50 and get a 2nd dose after 2-6 months  Pneumonia vaccine (Prevnar-20) at age 65       6. Advanced Directive/End of life care planning  You should consider having a signed document which informs physicians and family of your end of life care wishes.  You can go to [employer]/DNR/[place]. Under Step 1 click download AdobePDF and print.  This is the [place] physician order for scope of Treatment ([place]) form.  You can also request a blank copy of the [place] form from my office.  Bring a copy of the signed document to my office so we can scan it in your medical chart.

## 2022-12-06 NOTE — PROGRESS NOTES
Subjective:       Patient ID: Shoaib Esqueda is a 52 y.o. female.    Chief Complaint: Annual Exam (Swollen lymph nodes, took cipro, Doxy, Earache does not know if she has covid or not but she did not test. L hand shakes uncontrollably, L eye twitches, chills, sweating ) and Headache (Migraine since week before Thanksgiving )    She has been on Emgality for migraines and had not had a headache for 1 year.  About 2 weeks ago she developed a headache behind her right eye and face.  She took Relpax with minimal relief.  She continues to the right retro-orbital headache.  She denies any sinus symptoms.  She has recently taken Cipro and doxycycline without benefit.    She has a tremor of her left hand.  Her mother had a tremor as well.    Headache   This is a recurrent problem. The current episode started 1 to 4 weeks ago. The problem has been unchanged. Pertinent negatives include no rhinorrhea.   Review of Systems   Constitutional: Negative.    HENT:  Negative for postnasal drip and rhinorrhea.    Respiratory:  Negative for shortness of breath.    Cardiovascular:  Negative for chest pain.   Neurological:  Positive for tremors and headaches.       Objective:      Physical Exam  Vitals and nursing note reviewed.   Constitutional:       Appearance: She is well-developed.   HENT:      Head: Normocephalic.   Eyes:      Pupils: Pupils are equal, round, and reactive to light.   Cardiovascular:      Rate and Rhythm: Normal rate and regular rhythm.      Heart sounds: Normal heart sounds.   Pulmonary:      Effort: Pulmonary effort is normal.   Neurological:      Mental Status: She is alert.       Assessment:       Problem List Items Addressed This Visit          Unprioritized    Migraine - Primary    PCOS (polycystic ovarian syndrome)    Smoker    Hepatitis C    Tremor of left hand    Teeth clenching     Other Visit Diagnoses       Encounter for general adult medical examination with abnormal findings                  Plan:        Per orders and D/C instructions.    She will take Trudesa and Nurtec today for her migraine.  Continue Emgality.  Flexeril as needed for jaw clenching.  She will monitor the left hand tremor.  Check routine labs.

## 2022-12-06 NOTE — PROGRESS NOTES
Subjective:       Patient ID: Shoaib Esqueda is a 52 y.o. female.    Chief Complaint: Annual Exam (Swollen lymph nodes, took cipro, Doxy, Earache does not know if she has covid or not but she did not test. L hand shakes uncontrollably, L eye twitches, chills, sweating ) and Headache (Migraine since week before Thanksgiving )      Adult Wellness Exam:    Mental Conditions: None  Depression Risk Factors: None  BMI: See Vital signs   Colon screen:    See Health Maintenance Report      Females: Mammogram and PAP: per Gyn                                 Vaccines (Flu, Adacel, Shingrix): See Health Maintenance Report  Routine labs (Cholesterol, Glucose/Hgb A1C, and TSH): ordered.     The patient's current health status is: Good   Patient was educated on routine health maintenance. See Patient Instructions.                               Headache   Associated symptoms include sinus pressure.   Review of Systems   Constitutional: Negative.    HENT:  Positive for sinus pressure/congestion. Negative for nasal congestion and postnasal drip.    Respiratory:  Negative for shortness of breath.    Cardiovascular:  Negative for chest pain.   Neurological:  Positive for tremors and headaches.       Objective:      Physical Exam  Vitals and nursing note reviewed.   HENT:      Head: Normocephalic and atraumatic.   Eyes:      Pupils: Pupils are equal, round, and reactive to light.   Neurological:      Mental Status: She is alert.       Assessment:       Problem List Items Addressed This Visit          Unprioritized    Migraine - Primary    PCOS (polycystic ovarian syndrome)    Smoker    Hepatitis C    Tremor of left hand    Teeth clenching     Other Visit Diagnoses       Encounter for general adult medical examination with abnormal findings                  Plan:       Per orders and D/C instructions.    Check routine labs today.  She refuses a flu shot today.  She does not want to quit smoking cigarettes.  She is scheduled for  mammogram.

## 2022-12-07 ENCOUNTER — PATIENT MESSAGE (OUTPATIENT)
Dept: INTERNAL MEDICINE | Facility: CLINIC | Age: 52
End: 2022-12-07
Payer: COMMERCIAL

## 2022-12-08 RX ORDER — DIHYDROERGOTAMINE MESYLATE 4 MG/ML
SPRAY, METERED NASAL
Qty: 10 ML | Refills: 5 | Status: SHIPPED | OUTPATIENT
Start: 2022-12-08 | End: 2023-03-28

## 2023-02-10 DIAGNOSIS — R07.1 CHEST PAIN ON BREATHING: Primary | ICD-10-CM

## 2023-02-10 DIAGNOSIS — R04.2 COUGH WITH HEMOPTYSIS: ICD-10-CM

## 2023-02-22 ENCOUNTER — HOSPITAL ENCOUNTER (OUTPATIENT)
Dept: RADIOLOGY | Facility: OTHER | Age: 53
Discharge: HOME | End: 2023-02-22
Attending: INTERNAL MEDICINE
Payer: COMMERCIAL

## 2023-02-22 DIAGNOSIS — R07.1 CHEST PAIN ON BREATHING: ICD-10-CM

## 2023-02-22 DIAGNOSIS — R04.2 COUGH WITH HEMOPTYSIS: ICD-10-CM

## 2023-02-22 DIAGNOSIS — R79.89 OTHER SPECIFIED ABNORMAL FINDINGS OF BLOOD CHEMISTRY: Primary | ICD-10-CM

## 2023-02-22 PROCEDURE — 71275 CT ANGIOGRAPHY CHEST: CPT | Mod: 26,,, | Performed by: RADIOLOGY

## 2023-02-22 PROCEDURE — 71275 CTA CHEST NON CORONARY (PE STUDIES): ICD-10-PCS | Mod: 26,,, | Performed by: RADIOLOGY

## 2023-02-22 PROCEDURE — 71275 CT ANGIOGRAPHY CHEST: CPT | Mod: TC

## 2023-02-22 PROCEDURE — 25500020 PHARM REV CODE 255: Performed by: INTERNAL MEDICINE

## 2023-02-22 RX ADMIN — IOHEXOL 100 ML: 350 INJECTION, SOLUTION INTRAVENOUS at 05:02

## 2023-02-23 DIAGNOSIS — R91.8 PULMONARY NODULES: Primary | ICD-10-CM

## 2023-03-07 ENCOUNTER — TELEPHONE (OUTPATIENT)
Dept: PULMONOLOGY | Facility: CLINIC | Age: 53
End: 2023-03-07
Payer: COMMERCIAL

## 2023-03-07 NOTE — TELEPHONE ENCOUNTER
----- Message from Carina Arguelles sent at 3/7/2023  8:41 AM CST -----  Regarding: pt advice  Contact: Fariba @ [phone]  Fariba calling from [facility] calling in regards to a cd ,needing to know if this pt cd can be mailed or power share. Pls call to discuss

## 2023-03-09 ENCOUNTER — TELEPHONE (OUTPATIENT)
Dept: PULMONOLOGY | Facility: CLINIC | Age: 53
End: 2023-03-09
Payer: COMMERCIAL

## 2023-03-09 NOTE — TELEPHONE ENCOUNTER
Left message on patient voicemail, informing her that I'm contacting her in regards to rescheduling her appointment with Dr Oswald, I also advised patient that if she wants to schedule appointment or if she has any questions or concerns, she may contact the office. Office number has been provided.

## 2023-03-14 ENCOUNTER — DOCUMENTATION ONLY (OUTPATIENT)
Dept: INTERNAL MEDICINE | Facility: CLINIC | Age: 53
End: 2023-03-14
Payer: COMMERCIAL

## 2023-03-14 NOTE — PROGRESS NOTES
Patient called stated that she feels weird has had pain below her heart for several days, does not want to stroke out and would like Nitroglycerin. Advised patient to proceed to emergency room for evaluation. Stated that she would rather die then sit in the ER and she has read that you can live up to 3 months after heart attack. I again urged her to go to the ER due to Dr Duke is out the office for the week.

## 2023-03-20 DIAGNOSIS — R07.9 CHEST PAIN, UNSPECIFIED TYPE: Primary | ICD-10-CM

## 2023-03-27 ENCOUNTER — TELEPHONE (OUTPATIENT)
Dept: OBSTETRICS AND GYNECOLOGY | Facility: CLINIC | Age: 53
End: 2023-03-27

## 2023-03-27 DIAGNOSIS — Z20.2 POSSIBLE EXPOSURE TO STD: ICD-10-CM

## 2023-03-27 DIAGNOSIS — Z20.2 STD EXPOSURE: Primary | ICD-10-CM

## 2023-03-27 NOTE — TELEPHONE ENCOUNTER
"Pt states her ex boyfriend found out he has syphilis and is currently being treated for it.  He had a rash on his foot 12 years ago and he was treated with Doxy but wasn't tested for it then.  Requesting orders for RPR.  Declined all other STD testing including GC/CH, HIV, Hep B and Hep C.  States she "doesn't have none of the other shit".  Had GC/CH testing a few months ago and was negative.  Has already had Hep C and was treated.  Only wants RPR orders sent to [facility].  Fax [phone].She thinks she has lung cancer, not sure if its mets from colon or ovary.  Had pelvic pain for 2 years and her other OBGYN "didn't do shit for me".  Says (jokingly) she is about to "D-I-E" and STD testing is low on her priorities.     RPR orders sent to [facility] per pt's request.  Not due for annual until October.    "

## 2023-03-28 ENCOUNTER — OFFICE VISIT (OUTPATIENT)
Dept: PULMONOLOGY | Facility: CLINIC | Age: 53
End: 2023-03-28
Payer: COMMERCIAL

## 2023-03-28 VITALS
HEART RATE: 79 BPM | OXYGEN SATURATION: 99 % | WEIGHT: 149.94 LBS | HEIGHT: 67 IN | DIASTOLIC BLOOD PRESSURE: 79 MMHG | SYSTOLIC BLOOD PRESSURE: 128 MMHG | BODY MASS INDEX: 23.53 KG/M2

## 2023-03-28 DIAGNOSIS — R91.8 PULMONARY NODULES: ICD-10-CM

## 2023-03-28 PROCEDURE — 1160F RVW MEDS BY RX/DR IN RCRD: CPT | Mod: CPTII,S$GLB,, | Performed by: INTERNAL MEDICINE

## 2023-03-28 PROCEDURE — 3008F BODY MASS INDEX DOCD: CPT | Mod: CPTII,S$GLB,, | Performed by: INTERNAL MEDICINE

## 2023-03-28 PROCEDURE — 99203 PR OFFICE/OUTPT VISIT, NEW, LEVL III, 30-44 MIN: ICD-10-PCS | Mod: S$GLB,,, | Performed by: INTERNAL MEDICINE

## 2023-03-28 PROCEDURE — 3078F DIAST BP <80 MM HG: CPT | Mod: CPTII,S$GLB,, | Performed by: INTERNAL MEDICINE

## 2023-03-28 PROCEDURE — 1159F MED LIST DOCD IN RCRD: CPT | Mod: CPTII,S$GLB,, | Performed by: INTERNAL MEDICINE

## 2023-03-28 PROCEDURE — 1159F PR MEDICATION LIST DOCUMENTED IN MEDICAL RECORD: ICD-10-PCS | Mod: CPTII,S$GLB,, | Performed by: INTERNAL MEDICINE

## 2023-03-28 PROCEDURE — 3074F PR MOST RECENT SYSTOLIC BLOOD PRESSURE < 130 MM HG: ICD-10-PCS | Mod: CPTII,S$GLB,, | Performed by: INTERNAL MEDICINE

## 2023-03-28 PROCEDURE — 99999 PR PBB SHADOW E&M-EST. PATIENT-LVL IV: ICD-10-PCS | Mod: PBBFAC,,, | Performed by: INTERNAL MEDICINE

## 2023-03-28 PROCEDURE — 3078F PR MOST RECENT DIASTOLIC BLOOD PRESSURE < 80 MM HG: ICD-10-PCS | Mod: CPTII,S$GLB,, | Performed by: INTERNAL MEDICINE

## 2023-03-28 PROCEDURE — 3008F PR BODY MASS INDEX (BMI) DOCUMENTED: ICD-10-PCS | Mod: CPTII,S$GLB,, | Performed by: INTERNAL MEDICINE

## 2023-03-28 PROCEDURE — 99999 PR PBB SHADOW E&M-EST. PATIENT-LVL IV: CPT | Mod: PBBFAC,,, | Performed by: INTERNAL MEDICINE

## 2023-03-28 PROCEDURE — 99203 OFFICE O/P NEW LOW 30 MIN: CPT | Mod: S$GLB,,, | Performed by: INTERNAL MEDICINE

## 2023-03-28 PROCEDURE — 1160F PR REVIEW ALL MEDS BY PRESCRIBER/CLIN PHARMACIST DOCUMENTED: ICD-10-PCS | Mod: CPTII,S$GLB,, | Performed by: INTERNAL MEDICINE

## 2023-03-28 PROCEDURE — 3074F SYST BP LT 130 MM HG: CPT | Mod: CPTII,S$GLB,, | Performed by: INTERNAL MEDICINE

## 2023-03-28 NOTE — PROGRESS NOTES
Subjective:       Patient ID: Shoaib Esqueda is a 52 y.o. female.    Chief Complaint: Pulmonary Nodules    HPI:   Shoaib Esqueda is a 52 y.o. female who presents for evaluation of abnormal imaging.     COVID in January of 2023.  Fever, cough, chest tightness.     2012 + for latent TB; treated for three months, but then had liver issues.   Took advair for breathing issues in the past.     History of Hep C s/p treatment.    .   No family history of lung disease.   Quit smoking in February of 2023.   Smoked 1ppd since 1990.    Review of Systems   Constitutional:  Negative for weight loss.   Respiratory:  Negative for cough.        Social History     Tobacco Use    Smoking status: Former     Packs/day: 0.50     Years: 15.00     Pack years: 7.50     Types: Cigarettes     Passive exposure: Past    Smokeless tobacco: Never    Tobacco comments:     Pt stop smoking 2/22/2023 after receiving CT Results   Substance Use Topics    Alcohol use: Yes     Alcohol/week: 16.0 standard drinks     Types: 2 Shots of liquor, 14 Standard drinks or equivalent per week     Comment: 2 shots per night       Review of patient's allergies indicates:   Allergen Reactions    Pcn [penicillins]     Corticosteroids (glucocorticoids) Anxiety     Past Medical History:   Diagnosis Date    Hepatitis C     2000 Rx w/ chemo x 3 months.  Current  viral load is zero  Recent IUD (copper)     Migraine 9/5/2014    Nasal polyps 9/5/2014    PCOS (polycystic ovarian syndrome) 9/5/2014    PPD positive 10/27/2012     Past Surgical History:   Procedure Laterality Date    BREAST SURGERY      Implants    SINUS SURGERY      x3     Current Outpatient Medications on File Prior to Visit   Medication Sig    butalbital-acetaminophen-caffeine -40 mg (FIORICET, ESGIC) -40 mg per tablet TAKE 1 TABLET BY MOUTH EVERY DAY AS NEEDED FOR HEADACHE    cetirizine (ZYRTEC) 5 MG tablet Take 5 mg by mouth.    cyclobenzaprine (FLEXERIL) 10 MG tablet Take 1/2 tablet  "nightly as needed    eletriptan (RELPAX) 40 MG tablet Take 1 tablet (40 mg total) by mouth daily as needed (migraine).    galcanezumab-gnlm (EMGALITY PEN) 120 mg/mL PnIj ADMINISTER 1 ML( 120MG) UNDER THE SKIN EVERY 28 DAYS    valACYclovir (VALTREX) 1000 MG tablet TAKE 1 TABLET(1000 MG) BY MOUTH EVERY DAY    [DISCONTINUED] dihydroergotamine (TRUDHESA) 0.725 mg/pump act. (4 mg/mL) Spry One spray in each nostril daily as needed for migraine     No current facility-administered medications on file prior to visit.       Objective:      Vitals:    03/28/23 1503   BP: 128/79   BP Location: Left arm   Patient Position: Sitting   Pulse: 79   SpO2: 99%   Weight: 68 kg (149 lb 14.6 oz)   Height: 5' 7" (1.702 m)     Physical Exam   Constitutional: She is oriented to person, place, and time. She appears well-developed and well-nourished.   HENT:   Head: Normocephalic.   Neck: No tracheal deviation present.   Cardiovascular: Normal rate and regular rhythm.   No murmur heard.  Pulmonary/Chest: Normal expansion, effort normal and breath sounds normal. She has no wheezes. She has no rales.   Abdominal: Soft. Bowel sounds are normal. She exhibits no distension. There is no abdominal tenderness.   Musculoskeletal:         General: No edema. Normal range of motion.      Cervical back: Normal range of motion and neck supple.   Neurological: She is alert and oriented to person, place, and time.   Skin: Skin is warm and dry.   Vitals reviewed.  Personal Diagnostic Review    No flowsheet data found.      CTA Chest Non-Coronary (PE Studies)  Narrative: EXAMINATION:  CTA CHEST NON CORONARY (PE STUDIES)    CLINICAL HISTORY:  Pulmonary embolism (PE) suspected, high prob;Chest pain on breathing    TECHNIQUE:  Low dose axial images, sagittal and coronal reformations were obtained from the thoracic inlet to the lung bases following the IV administration of 100 mL of Omnipaque 350.  Contrast timing was optimized to evaluate the pulmonary arteries. "  MIP images were performed.    COMPARISON:  None    FINDINGS:  Structures at the base of the neck are unremarkable.  There is mild fusiform dilatation of the ascending thoracic aorta which measures upper limits of normal in caliber at 3.9 cm.  Thoracic aorta is otherwise normal in caliber with no aneurysm seen.  Branch vessels of the aortic arch are patent.  The heart is normal in size without pericardial effusion.  No intraluminal filling defects within the pulmonary arteries to suggest pulmonary thromboembolism.   There is no evidence of mediastinal, axillary, or hilar lymph node enlargement.  The esophagus is unremarkable along its course.    The trachea and bronchi are patent.  The lungs are symmetrically expanded.  There is a 9 mm nodule visualized within the posterior aspect of the right upper lobe.  Several additional smaller surrounding nodules are also seen.  Lungs show no consolidation, pleural effusion, pulmonary hemorrhage, or infarction.    The visualized abdominal structures are unremarkable.  No acute osseous abnormality identified.  Extrathoracic soft tissues show no acute abnormalities.  Partially calcified bilateral breast implants are seen..  Impression: 1. No evidence of PE.  No acute intrathoracic abnormalities identified.  2. Several right upper lobe pulmonary nodules, the largest of which measures 9 mm.  For multiple solid nodules with any 6 mm or greater, Fleischner Society guidelines recommend follow up with non-contrast chest CT at 3-6 months and 18-24 months after discovery.  3. Additional findings as detailed above.  This report was flagged in Epic as abnormal.  This report was flagged in Epic as containing an incidental finding.    Electronically signed by: John Mei MD  Date:    02/22/2023  Time:    18:33    Images were reviewed and discussed with the patient.    Assessment:     Orders Placed This Encounter   Procedures    CT Chest Without Contrast     Standing Status:   Future      Standing Expiration Date:   3/27/2024     Order Specific Question:   Is the patient pregnant?     Answer:   No     Order Specific Question:   May the Radiologist modify the order per protocol to meet the clinical needs of the patient?     Answer:   Yes     1. Pulmonary nodules        Plan:         Problem List Items Addressed This Visit          Pulmonary    Pulmonary nodules    Overview     Multiple solid nodules in the right upper lobe, the largest of which is 9mm, present on CT Chest form February 2023.         Current Assessment & Plan     Recommend a follow up scan in 3 months.  Further imaging and or biopsy will be dictated by those results.   Plan discussed in detail with the patient and all questions answered to her satisfaction.         Relevant Orders    CT Chest Without Contrast      30 minutes of total time spent on the encounter, which includes face to face time and non-face to face time preparing to see the patient (eg, review of tests), Obtaining and/or reviewing separately obtained history, Documenting clinical information in the electronic or other health record, Independently interpreting results (not separately reported) and communicating results to the patient/family/caregiver, or Care coordination (not separately reported).

## 2023-03-29 PROBLEM — R91.8 PULMONARY NODULES: Status: ACTIVE | Noted: 2023-03-29

## 2023-03-29 NOTE — ASSESSMENT & PLAN NOTE
Recommend a follow up scan in 3 months.  Further imaging and or biopsy will be dictated by those results.   Plan discussed in detail with the patient and all questions answered to her satisfaction.

## 2023-03-31 ENCOUNTER — LAB VISIT (OUTPATIENT)
Dept: LAB | Facility: HOSPITAL | Age: 53
End: 2023-03-31
Attending: INTERNAL MEDICINE
Payer: COMMERCIAL

## 2023-03-31 ENCOUNTER — HOSPITAL ENCOUNTER (OUTPATIENT)
Dept: CARDIOLOGY | Facility: CLINIC | Age: 53
Discharge: HOME | End: 2023-03-31
Payer: COMMERCIAL

## 2023-03-31 ENCOUNTER — OFFICE VISIT (OUTPATIENT)
Dept: CARDIOLOGY | Facility: CLINIC | Age: 53
End: 2023-03-31
Payer: COMMERCIAL

## 2023-03-31 VITALS
WEIGHT: 149.69 LBS | OXYGEN SATURATION: 100 % | HEIGHT: 67 IN | SYSTOLIC BLOOD PRESSURE: 131 MMHG | HEART RATE: 75 BPM | DIASTOLIC BLOOD PRESSURE: 94 MMHG | BODY MASS INDEX: 23.49 KG/M2

## 2023-03-31 DIAGNOSIS — Z13.6 ENCOUNTER FOR SCREENING FOR CARDIOVASCULAR DISORDERS: ICD-10-CM

## 2023-03-31 DIAGNOSIS — R07.9 CHEST PAIN, UNSPECIFIED TYPE: ICD-10-CM

## 2023-03-31 DIAGNOSIS — Z91.89 AT RISK FOR CORONARY ARTERY DISEASE: ICD-10-CM

## 2023-03-31 DIAGNOSIS — F17.200 SMOKER: ICD-10-CM

## 2023-03-31 DIAGNOSIS — F17.200 SMOKER: Primary | ICD-10-CM

## 2023-03-31 DIAGNOSIS — E28.2 PCOS (POLYCYSTIC OVARIAN SYNDROME): ICD-10-CM

## 2023-03-31 LAB
CHOLEST SERPL-MCNC: 247 MG/DL (ref 120–199)
CHOLEST/HDLC SERPL: 3.5 {RATIO} (ref 2–5)
CRP SERPL-MCNC: 0.39 MG/L (ref 0–3.19)
HDLC SERPL-MCNC: 71 MG/DL (ref 40–75)
HDLC SERPL: 28.7 % (ref 20–50)
LDLC SERPL CALC-MCNC: 159 MG/DL (ref 63–159)
NONHDLC SERPL-MCNC: 176 MG/DL
TRIGL SERPL-MCNC: 85 MG/DL (ref 30–150)

## 2023-03-31 PROCEDURE — 3008F BODY MASS INDEX DOCD: CPT | Mod: CPTII,S$GLB,, | Performed by: INTERNAL MEDICINE

## 2023-03-31 PROCEDURE — 99204 OFFICE O/P NEW MOD 45 MIN: CPT | Mod: 25,S$GLB,, | Performed by: INTERNAL MEDICINE

## 2023-03-31 PROCEDURE — 3080F DIAST BP >= 90 MM HG: CPT | Mod: CPTII,S$GLB,, | Performed by: INTERNAL MEDICINE

## 2023-03-31 PROCEDURE — 99999 PR PBB SHADOW E&M-EST. PATIENT-LVL IV: CPT | Mod: PBBFAC,,, | Performed by: INTERNAL MEDICINE

## 2023-03-31 PROCEDURE — 93000 EKG 12-LEAD: ICD-10-PCS | Mod: S$GLB,,, | Performed by: INTERNAL MEDICINE

## 2023-03-31 PROCEDURE — 1159F MED LIST DOCD IN RCRD: CPT | Mod: CPTII,S$GLB,, | Performed by: INTERNAL MEDICINE

## 2023-03-31 PROCEDURE — 3075F SYST BP GE 130 - 139MM HG: CPT | Mod: CPTII,S$GLB,, | Performed by: INTERNAL MEDICINE

## 2023-03-31 PROCEDURE — 1160F PR REVIEW ALL MEDS BY PRESCRIBER/CLIN PHARMACIST DOCUMENTED: ICD-10-PCS | Mod: CPTII,S$GLB,, | Performed by: INTERNAL MEDICINE

## 2023-03-31 PROCEDURE — 3075F PR MOST RECENT SYSTOLIC BLOOD PRESS GE 130-139MM HG: ICD-10-PCS | Mod: CPTII,S$GLB,, | Performed by: INTERNAL MEDICINE

## 2023-03-31 PROCEDURE — 99999 PR PBB SHADOW E&M-EST. PATIENT-LVL IV: ICD-10-PCS | Mod: PBBFAC,,, | Performed by: INTERNAL MEDICINE

## 2023-03-31 PROCEDURE — 3080F PR MOST RECENT DIASTOLIC BLOOD PRESSURE >= 90 MM HG: ICD-10-PCS | Mod: CPTII,S$GLB,, | Performed by: INTERNAL MEDICINE

## 2023-03-31 PROCEDURE — 80061 LIPID PANEL: CPT | Performed by: INTERNAL MEDICINE

## 2023-03-31 PROCEDURE — 86141 C-REACTIVE PROTEIN HS: CPT | Performed by: INTERNAL MEDICINE

## 2023-03-31 PROCEDURE — 36415 COLL VENOUS BLD VENIPUNCTURE: CPT | Performed by: INTERNAL MEDICINE

## 2023-03-31 PROCEDURE — 1159F PR MEDICATION LIST DOCUMENTED IN MEDICAL RECORD: ICD-10-PCS | Mod: CPTII,S$GLB,, | Performed by: INTERNAL MEDICINE

## 2023-03-31 PROCEDURE — 1160F RVW MEDS BY RX/DR IN RCRD: CPT | Mod: CPTII,S$GLB,, | Performed by: INTERNAL MEDICINE

## 2023-03-31 PROCEDURE — 83695 ASSAY OF LIPOPROTEIN(A): CPT | Performed by: INTERNAL MEDICINE

## 2023-03-31 PROCEDURE — 93000 ELECTROCARDIOGRAM COMPLETE: CPT | Mod: S$GLB,,, | Performed by: INTERNAL MEDICINE

## 2023-03-31 PROCEDURE — 99204 PR OFFICE/OUTPT VISIT, NEW, LEVL IV, 45-59 MIN: ICD-10-PCS | Mod: 25,S$GLB,, | Performed by: INTERNAL MEDICINE

## 2023-03-31 PROCEDURE — 3008F PR BODY MASS INDEX (BMI) DOCUMENTED: ICD-10-PCS | Mod: CPTII,S$GLB,, | Performed by: INTERNAL MEDICINE

## 2023-03-31 NOTE — PROGRESS NOTES
"Subjective:   Patient ID:  Shoaib Esqueda is a 52 y.o. female is a new patient who presents for evaluation of     Chest pain  HPI:   Chest pain in the center of the chest that is constant. Its staying the same right in the center of the chest., no association with breathing.   No SOB, no radiation.  Does not exercise regularly.  Patient works as a  and has a high stress job  1 ppd x 30 yrs  Quit 2 months ago.   Father had his valve replaced.  Going into Afib.   Irregular periods. Irregular periods since teen that has gotten better now and likely due to PCOS  Patient has breast implants since 1986  Patient has hep C.   "I have lived off diet coke and cigarettes all my life and now it can caught with me".   When she tries to walk too far her hands get swollen.     Patient Active Problem List   Diagnosis    PPD positive    Migraine    Nasal polyps    PCOS (polycystic ovarian syndrome)    Smoker    Hepatitis C    Tremor of left hand    Teeth clenching    Pulmonary nodules     BP (!) 131/94 (BP Location: Left arm, Patient Position: Sitting, BP Method: Medium (Automatic))   Pulse 75   Ht 5' 7" (1.702 m)   Wt 67.9 kg (149 lb 11.1 oz)   SpO2 100%   BMI 23.45 kg/m²   Body mass index is 23.45 kg/m².  CrCl cannot be calculated (Patient's most recent lab result is older than the maximum 7 days allowed.).    Lab Results   Component Value Date     02/22/2023    K 4.2 02/22/2023     02/22/2023    CO2 27 02/22/2023    BUN 17 02/22/2023    CREATININE 0.8 02/22/2023     02/22/2023    HGBA1C 5.2 12/06/2022    AST 26 02/22/2023    ALT 26 02/22/2023    ALBUMIN 4.4 02/22/2023    PROT 8.0 02/22/2023    BILITOT 0.7 02/22/2023    WBC 10.39 12/06/2022    HGB 15.6 12/06/2022    HCT 46.1 12/06/2022     (H) 12/06/2022     12/06/2022    INR 1.0 11/29/2021    TSH 1.306 12/06/2022    CHOL 288 (H) 12/06/2022    HDL 84 (H) 12/06/2022    LDLCALC 185.8 (H) 12/06/2022    TRIG 91 12/06/2022       Current " Outpatient Medications   Medication Sig    butalbital-acetaminophen-caffeine -40 mg (FIORICET, ESGIC) -40 mg per tablet TAKE 1 TABLET BY MOUTH EVERY DAY AS NEEDED FOR HEADACHE    cetirizine (ZYRTEC) 5 MG tablet Take 5 mg by mouth.    cyclobenzaprine (FLEXERIL) 10 MG tablet Take 1/2 tablet nightly as needed    eletriptan (RELPAX) 40 MG tablet Take 1 tablet (40 mg total) by mouth daily as needed (migraine).    galcanezumab-gnlm (EMGALITY PEN) 120 mg/mL PnIj ADMINISTER 1 ML( 120MG) UNDER THE SKIN EVERY 28 DAYS    valACYclovir (VALTREX) 1000 MG tablet TAKE 1 TABLET(1000 MG) BY MOUTH EVERY DAY     No current facility-administered medications for this visit.       Review of Systems   Constitutional: Negative for chills, decreased appetite, malaise/fatigue, night sweats, weight gain and weight loss.   Eyes:  Negative for blurred vision, double vision, visual disturbance and visual halos.   Cardiovascular:  Negative for chest pain, claudication, cyanosis, dyspnea on exertion, irregular heartbeat, leg swelling, near-syncope, orthopnea, palpitations, paroxysmal nocturnal dyspnea and syncope.   Respiratory:  Negative for cough, hemoptysis, snoring, sputum production and wheezing.    Endocrine: Negative for cold intolerance, heat intolerance, polydipsia and polyphagia.   Hematologic/Lymphatic: Negative for adenopathy and bleeding problem. Does not bruise/bleed easily.   Skin:  Negative for flushing, itching, poor wound healing and rash.   Musculoskeletal:  Negative for arthritis, back pain, falls, gout, joint pain, joint swelling, muscle cramps, muscle weakness, myalgias, neck pain and stiffness.   Gastrointestinal:  Negative for bloating, abdominal pain, anorexia, diarrhea, dysphagia, excessive appetite, flatus, hematemesis, jaundice, melena and nausea.   Genitourinary:  Negative for hesitancy and incomplete emptying.   Neurological:  Negative for aphonia, brief paralysis, difficulty with concentration,  disturbances in coordination, excessive daytime sleepiness, dizziness, focal weakness, light-headedness, loss of balance and weakness.   Psychiatric/Behavioral:  Negative for altered mental status, depression, hallucinations, hypervigilance, memory loss, substance abuse and suicidal ideas. The patient does not have insomnia and is not nervous/anxious.      Objective:   Physical Exam  Constitutional:       General: She is not in acute distress.     Appearance: She is well-developed. She is not diaphoretic.   HENT:      Head: Normocephalic and atraumatic.      Nose: Nose normal.      Mouth/Throat:      Pharynx: No oropharyngeal exudate.   Eyes:      General: No scleral icterus.        Right eye: No discharge.         Left eye: No discharge.      Conjunctiva/sclera: Conjunctivae normal.      Pupils: Pupils are equal, round, and reactive to light.   Neck:      Thyroid: No thyromegaly.      Vascular: No JVD.      Trachea: No tracheal deviation.   Cardiovascular:      Rate and Rhythm: Normal rate and regular rhythm.      Pulses: Intact distal pulses.      Heart sounds: Normal heart sounds. No murmur heard.    No friction rub. No gallop.   Pulmonary:      Effort: Pulmonary effort is normal. No respiratory distress.      Breath sounds: Normal breath sounds. No stridor. No wheezing or rales.   Chest:      Chest wall: No tenderness.   Abdominal:      General: Bowel sounds are normal. There is no distension.      Palpations: Abdomen is soft. There is no mass.      Tenderness: There is no abdominal tenderness. There is no guarding or rebound.   Musculoskeletal:         General: No tenderness. Normal range of motion.      Cervical back: Normal range of motion and neck supple.   Lymphadenopathy:      Cervical: No cervical adenopathy.   Skin:     General: Skin is warm.      Coloration: Skin is not pale.      Findings: No erythema or rash.   Neurological:      Mental Status: She is alert and oriented to person, place, and time.       Cranial Nerves: No cranial nerve deficit.      Motor: No abnormal muscle tone.      Coordination: Coordination normal.      Deep Tendon Reflexes: Reflexes are normal and symmetric. Reflexes normal.   Psychiatric:         Behavior: Behavior normal.         Thought Content: Thought content normal.         Judgment: Judgment normal.     Assessment:     1. Smoker    2. Chest pain, unspecified type    3. PCOS (polycystic ovarian syndrome)    4. Encounter for screening for cardiovascular disorders    5. At risk for coronary artery disease      Plan:     Shoaib was seen today for establish care, follow-up, chest pain and referral authorization.    Diagnoses and all orders for this visit:    Smoker  -     Cancel: Stress Echo; Future  -     LIPOPROTEIN A (LPA); Future  -     Lipid Panel; Future  -     CRP, High Sensitivity; Future    Chest pain, unspecified type  -     Ambulatory referral/consult to Cardiology    PCOS (polycystic ovarian syndrome)    Encounter for screening for cardiovascular disorders  -     CT Calcium Scoring Cardiac; Future    At risk for coronary artery disease  -     Cancel: Stress Echo; Future  -     LIPOPROTEIN A (LPA); Future  -     Lipid Panel; Future  -     CRP, High Sensitivity; Future  -     IN OFFICE EKG 12-LEAD (to [facility])  -     Stress Echo; Future      RTC 3 months. Risk stratification for CAD.   Counseled on importance of heart healthy diet low in saturated and trans fat and salt as well gradually starting a regular aerobic exercise regimen with goal of 30min 5x/week. Recommend BP diary. Call if systolic BP > 130 mmHg on checking repeatedly

## 2023-04-03 ENCOUNTER — HOSPITAL ENCOUNTER (OUTPATIENT)
Dept: RADIOLOGY | Facility: HOSPITAL | Age: 53
Discharge: HOME | End: 2023-04-03
Attending: INTERNAL MEDICINE
Payer: COMMERCIAL

## 2023-04-03 DIAGNOSIS — Z13.6 ENCOUNTER FOR SCREENING FOR CARDIOVASCULAR DISORDERS: ICD-10-CM

## 2023-04-03 PROCEDURE — 75571 CT CALCIUM SCORING CARDIAC: ICD-10-PCS | Mod: 26,,, | Performed by: RADIOLOGY

## 2023-04-03 PROCEDURE — 75571 CT HRT W/O DYE W/CA TEST: CPT | Mod: 26,,, | Performed by: RADIOLOGY

## 2023-04-03 PROCEDURE — 75571 CT HRT W/O DYE W/CA TEST: CPT | Mod: TC

## 2023-04-04 ENCOUNTER — PATIENT MESSAGE (OUTPATIENT)
Dept: CARDIOLOGY | Facility: CLINIC | Age: 53
End: 2023-04-04
Payer: COMMERCIAL

## 2023-04-04 LAB — LPA SERPL-MCNC: <5 MG/DL (ref 0–30)

## 2023-04-04 NOTE — PROGRESS NOTES
As Dr. Chopra requested me to do is to inform Ms. Esqueda about her test results. Tried to reach her phone but did not get a response. I then messaged her stating that her Cholesterol levels are high. Lets discuss on follow up visit. We scheduled her as a virtual.

## 2023-04-05 NOTE — PROGRESS NOTES
As Dr. Chopra requested me to do is to inform Yin about her test results. I called and informed Mr. Esqueda that her blood work was normal. She verbalized and understood.

## 2023-04-14 ENCOUNTER — HOSPITAL ENCOUNTER (OUTPATIENT)
Dept: CARDIOLOGY | Facility: HOSPITAL | Age: 53
Discharge: HOME | End: 2023-04-14
Attending: INTERNAL MEDICINE
Payer: COMMERCIAL

## 2023-04-14 VITALS — BODY MASS INDEX: 23.23 KG/M2 | WEIGHT: 148 LBS | HEIGHT: 67 IN

## 2023-04-14 DIAGNOSIS — Z91.89 AT RISK FOR CORONARY ARTERY DISEASE: ICD-10-CM

## 2023-04-14 LAB
ASCENDING AORTA: 3.6 CM
AV INDEX (PROSTH): 0.8
AV MEAN GRADIENT: 3 MMHG
AV PEAK GRADIENT: 5 MMHG
AV VALVE AREA: 2.96 CM2
AV VELOCITY RATIO: 0.69
BSA FOR ECHO PROCEDURE: 1.78 M2
CV ECHO LV RWT: 0.36 CM
CV STRESS BASE HR: 74 BPM
DIASTOLIC BLOOD PRESSURE: 83 MMHG
DOP CALC AO PEAK VEL: 1.15 M/S
DOP CALC AO VTI: 18.12 CM
DOP CALC LVOT AREA: 3.7 CM2
DOP CALC LVOT DIAMETER: 2.17 CM
DOP CALC LVOT PEAK VEL: 0.79 M/S
DOP CALC LVOT STROKE VOLUME: 53.64 CM3
DOP CALCLVOT PEAK VEL VTI: 14.51 CM
E WAVE DECELERATION TIME: 302.39 MSEC
E/A RATIO: 0.79
E/E' RATIO: 5.67 M/S
ECHO LV POSTERIOR WALL: 0.87 CM (ref 0.6–1.1)
EJECTION FRACTION: 58 %
FRACTIONAL SHORTENING: 26 % (ref 28–44)
INTERVENTRICULAR SEPTUM: 0.88 CM (ref 0.6–1.1)
IVRT: 139.87 MSEC
LA MAJOR: 4.91 CM
LA MINOR: 5.46 CM
LA WIDTH: 2.9 CM
LEFT ATRIUM SIZE: 2.63 CM
LEFT ATRIUM VOLUME INDEX: 18.8 ML/M2
LEFT ATRIUM VOLUME: 33.52 CM3
LEFT INTERNAL DIMENSION IN SYSTOLE: 3.59 CM (ref 2.1–4)
LEFT VENTRICLE DIASTOLIC VOLUME INDEX: 61.24 ML/M2
LEFT VENTRICLE DIASTOLIC VOLUME: 109 ML
LEFT VENTRICLE MASS INDEX: 81 G/M2
LEFT VENTRICLE SYSTOLIC VOLUME INDEX: 30.3 ML/M2
LEFT VENTRICLE SYSTOLIC VOLUME: 53.93 ML
LEFT VENTRICULAR INTERNAL DIMENSION IN DIASTOLE: 4.83 CM (ref 3.5–6)
LEFT VENTRICULAR MASS: 143.88 G
LV LATERAL E/E' RATIO: 4.86 M/S
LV SEPTAL E/E' RATIO: 6.8 M/S
MV A" WAVE DURATION": 9.42 MSEC
MV PEAK A VEL: 0.43 M/S
MV PEAK E VEL: 0.34 M/S
MV STENOSIS PRESSURE HALF TIME: 87.69 MS
MV VALVE AREA P 1/2 METHOD: 2.51 CM2
OHS CV CPX 1 MINUTE RECOVERY HEART RATE: 142 BPM
OHS CV CPX 85 PERCENT MAX PREDICTED HEART RATE MALE: 135
OHS CV CPX ESTIMATED METS: 12
OHS CV CPX MAX PREDICTED HEART RATE: 159
OHS CV CPX PATIENT IS FEMALE: 1
OHS CV CPX PATIENT IS MALE: 0
OHS CV CPX PEAK DIASTOLIC BLOOD PRESSURE: 102 MMHG
OHS CV CPX PEAK HEAR RATE: 173 BPM
OHS CV CPX PEAK RATE PRESSURE PRODUCT: NORMAL
OHS CV CPX PEAK SYSTOLIC BLOOD PRESSURE: 198 MMHG
OHS CV CPX PERCENT MAX PREDICTED HEART RATE ACHIEVED: 109
OHS CV CPX RATE PRESSURE PRODUCT PRESENTING: NORMAL
PISA TR MAX VEL: 2.32 M/S
PULM VEIN S/D RATIO: 2.36
PV PEAK D VEL: 0.36 M/S
PV PEAK S VEL: 0.85 M/S
RA MAJOR: 4.4 CM
RA PRESSURE: 3 MMHG
RA WIDTH: 3.65 CM
RIGHT VENTRICULAR END-DIASTOLIC DIMENSION: 3.33 CM
RV TISSUE DOPPLER FREE WALL SYSTOLIC VELOCITY 1 (APICAL 4 CHAMBER VIEW): 11.9 CM/S
SINUS: 3.76 CM
STJ: 3.33 CM
STRESS ECHO POST EXERCISE DUR MIN: 7 MINUTES
STRESS ECHO POST EXERCISE DUR SEC: 29 SECONDS
SYSTOLIC BLOOD PRESSURE: 148 MMHG
TDI LATERAL: 0.07 M/S
TDI SEPTAL: 0.05 M/S
TDI: 0.06 M/S
TR MAX PG: 22 MMHG
TRICUSPID ANNULAR PLANE SYSTOLIC EXCURSION: 2.13 CM
TV REST PULMONARY ARTERY PRESSURE: 25 MMHG

## 2023-04-14 PROCEDURE — 93325 DOPPLER ECHO COLOR FLOW MAPG: CPT | Mod: 26,,, | Performed by: INTERNAL MEDICINE

## 2023-04-14 PROCEDURE — 93320 STRESS ECHO (CUPID ONLY): ICD-10-PCS | Mod: 26,,, | Performed by: INTERNAL MEDICINE

## 2023-04-14 PROCEDURE — 93325 STRESS ECHO (CUPID ONLY): ICD-10-PCS | Mod: 26,,, | Performed by: INTERNAL MEDICINE

## 2023-04-14 PROCEDURE — 93351 STRESS ECHO (CUPID ONLY): ICD-10-PCS | Mod: 26,,, | Performed by: INTERNAL MEDICINE

## 2023-04-14 PROCEDURE — 93325 DOPPLER ECHO COLOR FLOW MAPG: CPT

## 2023-04-14 PROCEDURE — 93351 STRESS TTE COMPLETE: CPT | Mod: 26,,, | Performed by: INTERNAL MEDICINE

## 2023-04-14 PROCEDURE — 93320 DOPPLER ECHO COMPLETE: CPT | Mod: 26,,, | Performed by: INTERNAL MEDICINE

## 2023-04-18 ENCOUNTER — OFFICE VISIT (OUTPATIENT)
Dept: CARDIOLOGY | Facility: CLINIC | Age: 53
End: 2023-04-18
Payer: COMMERCIAL

## 2023-04-18 ENCOUNTER — PATIENT MESSAGE (OUTPATIENT)
Dept: CARDIOLOGY | Facility: CLINIC | Age: 53
End: 2023-04-18

## 2023-04-18 DIAGNOSIS — I25.10 CORONARY ARTERY DISEASE INVOLVING NATIVE HEART, UNSPECIFIED VESSEL OR LESION TYPE, UNSPECIFIED WHETHER ANGINA PRESENT: Primary | ICD-10-CM

## 2023-04-18 DIAGNOSIS — E78.2 MIXED HYPERLIPIDEMIA: ICD-10-CM

## 2023-04-18 DIAGNOSIS — R94.39 ABNORMAL STRESS ECHO: ICD-10-CM

## 2023-04-18 DIAGNOSIS — R94.39 ABNORMAL CARDIOVASCULAR STRESS TEST: Primary | ICD-10-CM

## 2023-04-18 DIAGNOSIS — F17.200 SMOKER: ICD-10-CM

## 2023-04-18 DIAGNOSIS — R07.9 CHEST PAIN, UNSPECIFIED TYPE: ICD-10-CM

## 2023-04-18 DIAGNOSIS — R94.39 ABNORMAL STRESS ECHO: Primary | ICD-10-CM

## 2023-04-18 PROCEDURE — 99204 OFFICE O/P NEW MOD 45 MIN: CPT | Mod: 95,,, | Performed by: INTERNAL MEDICINE

## 2023-04-18 PROCEDURE — 99204 PR OFFICE/OUTPT VISIT, NEW, LEVL IV, 45-59 MIN: ICD-10-PCS | Mod: 95,,, | Performed by: INTERNAL MEDICINE

## 2023-04-18 PROCEDURE — 1160F PR REVIEW ALL MEDS BY PRESCRIBER/CLIN PHARMACIST DOCUMENTED: ICD-10-PCS | Mod: CPTII,95,, | Performed by: INTERNAL MEDICINE

## 2023-04-18 PROCEDURE — 1159F MED LIST DOCD IN RCRD: CPT | Mod: CPTII,95,, | Performed by: INTERNAL MEDICINE

## 2023-04-18 PROCEDURE — 1160F RVW MEDS BY RX/DR IN RCRD: CPT | Mod: CPTII,95,, | Performed by: INTERNAL MEDICINE

## 2023-04-18 PROCEDURE — 1159F PR MEDICATION LIST DOCUMENTED IN MEDICAL RECORD: ICD-10-PCS | Mod: CPTII,95,, | Performed by: INTERNAL MEDICINE

## 2023-04-18 RX ORDER — SODIUM CHLORIDE 9 MG/ML
INJECTION, SOLUTION INTRAVENOUS CONTINUOUS
Status: CANCELLED | OUTPATIENT
Start: 2023-04-18 | End: 2023-04-18

## 2023-04-18 RX ORDER — SODIUM CHLORIDE 0.9 % (FLUSH) 0.9 %
10 SYRINGE (ML) INJECTION
Status: CANCELLED | OUTPATIENT
Start: 2023-04-18

## 2023-04-18 RX ORDER — CLOPIDOGREL BISULFATE 75 MG/1
TABLET ORAL
Qty: 30 TABLET | Refills: 11 | Status: ON HOLD | OUTPATIENT
Start: 2023-04-18 | End: 2023-04-21 | Stop reason: HOSPADM

## 2023-04-18 RX ORDER — ASPIRIN 81 MG/1
81 TABLET ORAL DAILY
Start: 2023-04-18 | End: 2023-05-25

## 2023-04-18 RX ORDER — DIPHENHYDRAMINE HCL 50 MG
50 CAPSULE ORAL ONCE
Status: CANCELLED | OUTPATIENT
Start: 2023-04-18 | End: 2023-04-18

## 2023-04-18 NOTE — TELEPHONE ENCOUNTER
----- Message from Concepcion Mccoy RN sent at 4/17/2023 10:12 AM CDT -----  Regarding: FW: r/o angina    ----- Message -----  From: Concepcion Mccoy RN  Sent: 4/17/2023  10:07 AM CDT  To: Concepcion Mccoy RN, #  Subject: r/o angina                                       Pt states has been having the same ongoing CP,which she calls angina, continuous, 3-4/ 10 since saw dr Chopra.  In view of positive echo, she wants to know if she can get ntg to improve the pain.    Dr Chopra is not here to day and pt states that addressing issue in Am is ok as she has been having it for a while.     Please address.    ----- Message -----  From: Josephine Han  Sent: 4/17/2023  10:01 AM CDT  To: Concepcion Mccoy RN  Subject: angina                                           PT is having angina and would like to discuss possibly receiving a medication.  Pt recently had an echo on Friday.  Call transferred     Thanks

## 2023-04-18 NOTE — H&P (VIEW-ONLY)
The patient location is: work  The chief complaint leading to consultation is: chest pain    Visit type: audiovisual    Face to Face time with patient: 20 minutes  60 minutes of total time spent on the encounter, which includes face to face time and non-face to face time preparing to see the patient (eg, review of tests), Obtaining and/or reviewing separately obtained history, Documenting clinical information in the electronic or other health record, Independently interpreting results (not separately reported) and communicating results to the patient/family/caregiver, or Care coordination (not separately reported).         Each patient to whom he or she provides medical services by telemedicine is:  (1) informed of the relationship between the physician and patient and the respective role of any other health care provider with respect to management of the patient; and (2) notified that he or she may decline to receive medical services by telemedicine and may withdraw from such care at any time.    Notes: see below     Subjective:    Patient ID:  Shoaib Esqueda is a 52 y.o. female who presents for evaluation of Chest Pain      Referring physician: Dr. Romelia GRISSOM  Mrs. Esqueda is a 53 y/o woman with HLD and who quit smoking 2 months ago.  She reports that she had COVID-19 infection in January 2023 and since then has been experiencing chest pain.  The chest pain is described as a tightness or squeezing sensation. She reports it is constant but decreases in severity after taking 3 aspirin in the morning and 3 aspirin in the evening.  She reports that since the [place] the pain has become worse and is currently 3/10 in severity.  It is exacerbated by gardening.  Patient reports bilateral dependent lower extremity edema.  She denies orthopnea or PND.  She denies syncope but reports several episodes of dizzy spells.  Additionally the patient reports occasional palpitations described as an extra  forceful heartbeat.    Past Medical History:   Diagnosis Date    Hepatitis C     2000 Rx w/ chemo x 3 months.  Current  viral load is zero  Recent IUD (copper)     Migraine 9/5/2014    Nasal polyps 9/5/2014    PCOS (polycystic ovarian syndrome) 9/5/2014    PPD positive 10/27/2012       Past Surgical History:   Procedure Laterality Date    BREAST SURGERY      Implants    SINUS SURGERY      x3       Current Outpatient Medications on File Prior to Visit   Medication Sig Dispense Refill    butalbital-acetaminophen-caffeine -40 mg (FIORICET, ESGIC) -40 mg per tablet TAKE 1 TABLET BY MOUTH EVERY DAY AS NEEDED FOR HEADACHE 20 tablet 0    cetirizine (ZYRTEC) 5 MG tablet Take 5 mg by mouth.      cyclobenzaprine (FLEXERIL) 10 MG tablet Take 1/2 tablet nightly as needed 15 tablet 0    eletriptan (RELPAX) 40 MG tablet Take 1 tablet (40 mg total) by mouth daily as needed (migraine). 15 tablet 1    galcanezumab-gnlm (EMGALITY PEN) 120 mg/mL PnIj ADMINISTER 1 ML( 120MG) UNDER THE SKIN EVERY 28 DAYS 3 mL 1    valACYclovir (VALTREX) 1000 MG tablet TAKE 1 TABLET(1000 MG) BY MOUTH EVERY DAY 90 tablet 2     No current facility-administered medications on file prior to visit.       Review of patient's allergies indicates:   Allergen Reactions    Pcn [penicillins]     Corticosteroids (glucocorticoids) Anxiety       Social History     Tobacco Use    Smoking status: Former     Packs/day: 0.50     Years: 15.00     Pack years: 7.50     Types: Cigarettes     Passive exposure: Past    Smokeless tobacco: Never    Tobacco comments:     Pt stop smoking 2/22/2023 after receiving CT Results   Substance Use Topics    Alcohol use: Yes     Alcohol/week: 16.0 standard drinks     Types: 2 Shots of liquor, 14 Standard drinks or equivalent per week     Comment: 2 shots per night    Drug use: No       Family History   Problem Relation Age of Onset    Hypertension Mother     Heart disease Father     Hypertension Father     Cancer Maternal Aunt      Cancer Maternal Uncle     Breast cancer Neg Hx     Ovarian cancer Neg Hx          Review of Systems   Constitutional: Negative for diaphoresis, fever, malaise/fatigue and weight gain.   HENT:  Negative for congestion, hearing loss, nosebleeds and sore throat.    Eyes:  Negative for visual disturbance.   Cardiovascular:  Positive for chest pain. Negative for claudication, dyspnea on exertion, irregular heartbeat, leg swelling, near-syncope, orthopnea, palpitations, paroxysmal nocturnal dyspnea and syncope.   Respiratory:  Negative for cough, hemoptysis, shortness of breath and wheezing.    Endocrine: Negative for polyuria.   Hematologic/Lymphatic: Negative for bleeding problem. Does not bruise/bleed easily.   Skin:  Negative for poor wound healing and rash.   Musculoskeletal:  Negative for myalgias.   Gastrointestinal:  Negative for abdominal pain, change in bowel habit, constipation, diarrhea, dysphagia, heartburn, hematemesis, melena, nausea and vomiting.   Genitourinary:  Negative for bladder incontinence and dysuria.   Neurological:  Negative for focal weakness, headaches and weakness.   Psychiatric/Behavioral:  Negative for depression.    Allergic/Immunologic: Negative for hives and persistent infections.      Objective:   There were no vitals filed for this visit.     Physical Exam    Telehealth visit tele health visit  Assessment:       1. Abnormal cardiovascular stress test    2. Abnormal stress echo    3. Chest pain, unspecified type    4. Mixed hyperlipidemia    5. Smoker         Plan:       1) abnormal stress test.  The patient reports a history constant chest pain since her COVID infection that became worse after the [place] Par[place].  Her major risk factors for coronary artery disease are hyperlipidemia and a history of smoking.  Additionally the patient has been treated for hepatitis-C.  I reviewed the patient's 04/14/2023 stress echocardiogram which reported myocardial ischemia.  I therefore discussed  cardiac catheterization and possible PCI with the patient.  I discussed the need for dual antiplatelet therapy if the patient undergoes drug-eluting stent placement.  The patient stated that she would like to proceed with cardiac catheterization and possible PCI.  -6 [place] right radial access  -start enteric-coated aspirin 81 mg p.o. q.day  -start Plavix 600 mg p.o. x1 today and then 75 mg p.o. q.day  -as this is a telehealth visit written consent will be obtained when the patient presents for cardiac catheterization  -will need to check beta-hCG when the patient presents for cardiac catheterization as she is not postmenopausal    2) dyslipidemia.  03/31/2023 lipid panel reviewed.  If the patient is found to have high-grade coronary artery disease then will initiate statin therapy.  The patient's LFTs on 02/22/2023 were normal.    3) tobacco use.  The patient is commended on quitting smoking    All of the patient's questions were answered.

## 2023-04-18 NOTE — TELEPHONE ENCOUNTER
----- Message from Janice Whitt RN sent at 4/18/2023 10:56 AM CDT -----  Regarding: RE: Heart Cath this week  Dr. Restrepo only has clinic on Thursdays.  I just spoke to Dr. Restrepo about this patient.  There is also no time available this week in the cath lab.  If patient needs  urgent cath please send her through ED.  Otherwise, we will try to accommodate as quickly as we can.    Thanks  VANI Camacho  ----- Message -----  From: Concepcion Mccoy RN  Sent: 4/18/2023  10:40 AM CDT  To: Marlin Finn RN, #  Subject: Heart Cath this week                             Hi,  Dr Chopra would like this patient to have a heart cath this week with dr Restrepo and will message dr Restrepo on that issue.  The earliest appt I could find was for 4/27.    Please help to reschedule this pt sooner.    Thanks.      Concepcion,  General Cardiology Triage RN

## 2023-04-18 NOTE — TELEPHONE ENCOUNTER
Dr Chopra updated, reviewed chart and called pt for update.  Per dr Chopra, pt needs heart cath w. dr Restrepo this week. Ok to order NTG. Referral placed and pt scheduled for earliest available 4/27 and message sent to dr Restrepo's team about needing appt sooner/ cath this week. Pt updated, verbalized understanding and agreed to date/time of appointment(s).

## 2023-04-18 NOTE — PROGRESS NOTES
The patient location is: work  The chief complaint leading to consultation is: chest pain    Visit type: audiovisual    Face to Face time with patient: 20 minutes  60 minutes of total time spent on the encounter, which includes face to face time and non-face to face time preparing to see the patient (eg, review of tests), Obtaining and/or reviewing separately obtained history, Documenting clinical information in the electronic or other health record, Independently interpreting results (not separately reported) and communicating results to the patient/family/caregiver, or Care coordination (not separately reported).         Each patient to whom he or she provides medical services by telemedicine is:  (1) informed of the relationship between the physician and patient and the respective role of any other health care provider with respect to management of the patient; and (2) notified that he or she may decline to receive medical services by telemedicine and may withdraw from such care at any time.    Notes: see below     Subjective:    Patient ID:  Shoaib Esqueda is a 52 y.o. female who presents for evaluation of Chest Pain      Referring physician: Dr. Romelia GRISSOM  Mrs. Esqueda is a 53 y/o woman with HLD and who quit smoking 2 months ago.  She reports that she had COVID-19 infection in January 2023 and since then has been experiencing chest pain.  The chest pain is described as a tightness or squeezing sensation. She reports it is constant but decreases in severity after taking 3 aspirin in the morning and 3 aspirin in the evening.  She reports that since the [place] the pain has become worse and is currently 3/10 in severity.  It is exacerbated by gardening.  Patient reports bilateral dependent lower extremity edema.  She denies orthopnea or PND.  She denies syncope but reports several episodes of dizzy spells.  Additionally the patient reports occasional palpitations described as an extra  forceful heartbeat.    Past Medical History:   Diagnosis Date    Hepatitis C     2000 Rx w/ chemo x 3 months.  Current  viral load is zero  Recent IUD (copper)     Migraine 9/5/2014    Nasal polyps 9/5/2014    PCOS (polycystic ovarian syndrome) 9/5/2014    PPD positive 10/27/2012       Past Surgical History:   Procedure Laterality Date    BREAST SURGERY      Implants    SINUS SURGERY      x3       Current Outpatient Medications on File Prior to Visit   Medication Sig Dispense Refill    butalbital-acetaminophen-caffeine -40 mg (FIORICET, ESGIC) -40 mg per tablet TAKE 1 TABLET BY MOUTH EVERY DAY AS NEEDED FOR HEADACHE 20 tablet 0    cetirizine (ZYRTEC) 5 MG tablet Take 5 mg by mouth.      cyclobenzaprine (FLEXERIL) 10 MG tablet Take 1/2 tablet nightly as needed 15 tablet 0    eletriptan (RELPAX) 40 MG tablet Take 1 tablet (40 mg total) by mouth daily as needed (migraine). 15 tablet 1    galcanezumab-gnlm (EMGALITY PEN) 120 mg/mL PnIj ADMINISTER 1 ML( 120MG) UNDER THE SKIN EVERY 28 DAYS 3 mL 1    valACYclovir (VALTREX) 1000 MG tablet TAKE 1 TABLET(1000 MG) BY MOUTH EVERY DAY 90 tablet 2     No current facility-administered medications on file prior to visit.       Review of patient's allergies indicates:   Allergen Reactions    Pcn [penicillins]     Corticosteroids (glucocorticoids) Anxiety       Social History     Tobacco Use    Smoking status: Former     Packs/day: 0.50     Years: 15.00     Pack years: 7.50     Types: Cigarettes     Passive exposure: Past    Smokeless tobacco: Never    Tobacco comments:     Pt stop smoking 2/22/2023 after receiving CT Results   Substance Use Topics    Alcohol use: Yes     Alcohol/week: 16.0 standard drinks     Types: 2 Shots of liquor, 14 Standard drinks or equivalent per week     Comment: 2 shots per night    Drug use: No       Family History   Problem Relation Age of Onset    Hypertension Mother     Heart disease Father     Hypertension Father     Cancer Maternal Aunt      Cancer Maternal Uncle     Breast cancer Neg Hx     Ovarian cancer Neg Hx          Review of Systems   Constitutional: Negative for diaphoresis, fever, malaise/fatigue and weight gain.   HENT:  Negative for congestion, hearing loss, nosebleeds and sore throat.    Eyes:  Negative for visual disturbance.   Cardiovascular:  Positive for chest pain. Negative for claudication, dyspnea on exertion, irregular heartbeat, leg swelling, near-syncope, orthopnea, palpitations, paroxysmal nocturnal dyspnea and syncope.   Respiratory:  Negative for cough, hemoptysis, shortness of breath and wheezing.    Endocrine: Negative for polyuria.   Hematologic/Lymphatic: Negative for bleeding problem. Does not bruise/bleed easily.   Skin:  Negative for poor wound healing and rash.   Musculoskeletal:  Negative for myalgias.   Gastrointestinal:  Negative for abdominal pain, change in bowel habit, constipation, diarrhea, dysphagia, heartburn, hematemesis, melena, nausea and vomiting.   Genitourinary:  Negative for bladder incontinence and dysuria.   Neurological:  Negative for focal weakness, headaches and weakness.   Psychiatric/Behavioral:  Negative for depression.    Allergic/Immunologic: Negative for hives and persistent infections.      Objective:   There were no vitals filed for this visit.     Physical Exam    Telehealth visit tele health visit  Assessment:       1. Abnormal cardiovascular stress test    2. Abnormal stress echo    3. Chest pain, unspecified type    4. Mixed hyperlipidemia    5. Smoker         Plan:       1) abnormal stress test.  The patient reports a history constant chest pain since her COVID infection that became worse after the [place] Parade.  Her major risk factors for coronary artery disease are hyperlipidemia and a history of smoking.  Additionally the patient has been treated for hepatitis-C.  I reviewed the patient's 04/14/2023 stress echocardiogram which reported myocardial ischemia.  I therefore discussed  cardiac catheterization and possible PCI with the patient.  I discussed the need for dual antiplatelet therapy if the patient undergoes drug-eluting stent placement.  The patient stated that she would like to proceed with cardiac catheterization and possible PCI.  -6 [place] right radial access  -start enteric-coated aspirin 81 mg p.o. q.day  -start Plavix 600 mg p.o. x1 today and then 75 mg p.o. q.day  -as this is a telehealth visit written consent will be obtained when the patient presents for cardiac catheterization  -will need to check beta-hCG when the patient presents for cardiac catheterization as she is not postmenopausal    2) dyslipidemia.  03/31/2023 lipid panel reviewed.  If the patient is found to have high-grade coronary artery disease then will initiate statin therapy.  The patient's LFTs on 02/22/2023 were normal.    3) tobacco use.  The patient is commended on quitting smoking    All of the patient's questions were answered.

## 2023-04-19 ENCOUNTER — LAB VISIT (OUTPATIENT)
Dept: LAB | Facility: OTHER | Age: 53
End: 2023-04-19
Attending: INTERNAL MEDICINE
Payer: COMMERCIAL

## 2023-04-19 DIAGNOSIS — R07.9 CHEST PAIN, UNSPECIFIED TYPE: ICD-10-CM

## 2023-04-19 DIAGNOSIS — I25.10 CORONARY ARTERY DISEASE INVOLVING NATIVE HEART, UNSPECIFIED VESSEL OR LESION TYPE, UNSPECIFIED WHETHER ANGINA PRESENT: ICD-10-CM

## 2023-04-19 LAB
ANION GAP SERPL CALC-SCNC: 7 MMOL/L (ref 8–16)
APTT PPP: 30.2 SEC (ref 21–32)
BUN SERPL-MCNC: 10 MG/DL (ref 6–20)
CALCIUM SERPL-MCNC: 9.1 MG/DL (ref 8.7–10.5)
CHLORIDE SERPL-SCNC: 106 MMOL/L (ref 95–110)
CO2 SERPL-SCNC: 26 MMOL/L (ref 23–29)
CREAT SERPL-MCNC: 0.7 MG/DL (ref 0.5–1.4)
ERYTHROCYTE [DISTWIDTH] IN BLOOD BY AUTOMATED COUNT: 13 % (ref 11.5–14.5)
EST. GFR  (NO RACE VARIABLE): >60 ML/MIN/1.73 M^2
GLUCOSE SERPL-MCNC: 110 MG/DL (ref 70–110)
HCT VFR BLD AUTO: 45.2 % (ref 37–48.5)
HGB BLD-MCNC: 15 G/DL (ref 12–16)
INR PPP: 1 (ref 0.8–1.2)
MCH RBC QN AUTO: 32.9 PG (ref 27–31)
MCHC RBC AUTO-ENTMCNC: 33.2 G/DL (ref 32–36)
MCV RBC AUTO: 99 FL (ref 82–98)
PLATELET # BLD AUTO: 227 K/UL (ref 150–450)
PMV BLD AUTO: 10.1 FL (ref 9.2–12.9)
POTASSIUM SERPL-SCNC: 4.3 MMOL/L (ref 3.5–5.1)
PROTHROMBIN TIME: 11.2 SEC (ref 9–12.5)
RBC # BLD AUTO: 4.56 M/UL (ref 4–5.4)
SODIUM SERPL-SCNC: 139 MMOL/L (ref 136–145)
WBC # BLD AUTO: 7.52 K/UL (ref 3.9–12.7)

## 2023-04-19 PROCEDURE — 85027 COMPLETE CBC AUTOMATED: CPT | Performed by: INTERNAL MEDICINE

## 2023-04-19 PROCEDURE — 80048 BASIC METABOLIC PNL TOTAL CA: CPT | Performed by: INTERNAL MEDICINE

## 2023-04-19 PROCEDURE — 36415 COLL VENOUS BLD VENIPUNCTURE: CPT | Performed by: INTERNAL MEDICINE

## 2023-04-19 PROCEDURE — 85730 THROMBOPLASTIN TIME PARTIAL: CPT | Performed by: INTERNAL MEDICINE

## 2023-04-19 PROCEDURE — 85610 PROTHROMBIN TIME: CPT | Performed by: INTERNAL MEDICINE

## 2023-04-19 RX ORDER — NITROGLYCERIN 0.4 MG/1
0.4 TABLET SUBLINGUAL EVERY 5 MIN PRN
Qty: 25 TABLET | Refills: 4 | Status: SHIPPED | OUTPATIENT
Start: 2023-04-19 | End: 2023-05-25

## 2023-04-21 ENCOUNTER — HOSPITAL ENCOUNTER (OUTPATIENT)
Facility: HOSPITAL | Age: 53
Discharge: HOME | End: 2023-04-21
Attending: INTERNAL MEDICINE | Admitting: INTERNAL MEDICINE
Payer: COMMERCIAL

## 2023-04-21 VITALS
TEMPERATURE: 98 F | RESPIRATION RATE: 18 BRPM | WEIGHT: 148 LBS | HEIGHT: 67 IN | HEART RATE: 60 BPM | OXYGEN SATURATION: 98 % | BODY MASS INDEX: 23.23 KG/M2 | SYSTOLIC BLOOD PRESSURE: 135 MMHG | DIASTOLIC BLOOD PRESSURE: 88 MMHG

## 2023-04-21 DIAGNOSIS — R07.9 CHEST PAIN: ICD-10-CM

## 2023-04-21 DIAGNOSIS — I25.10 CORONARY ARTERY DISEASE INVOLVING NATIVE HEART, UNSPECIFIED VESSEL OR LESION TYPE, UNSPECIFIED WHETHER ANGINA PRESENT: ICD-10-CM

## 2023-04-21 DIAGNOSIS — I25.10 CAD (CORONARY ARTERY DISEASE): ICD-10-CM

## 2023-04-21 LAB
ABO + RH BLD: NORMAL
ANION GAP SERPL CALC-SCNC: 9 MMOL/L (ref 8–16)
APTT PPP: 29.5 SEC (ref 21–32)
B-HCG UR QL: NEGATIVE
BASOPHILS # BLD AUTO: 0.06 K/UL (ref 0–0.2)
BASOPHILS NFR BLD: 0.6 % (ref 0–1.9)
BLD GP AB SCN CELLS X3 SERPL QL: NORMAL
BUN SERPL-MCNC: 13 MG/DL (ref 6–20)
CALCIUM SERPL-MCNC: 9.8 MG/DL (ref 8.7–10.5)
CHLORIDE SERPL-SCNC: 104 MMOL/L (ref 95–110)
CO2 SERPL-SCNC: 25 MMOL/L (ref 23–29)
CREAT SERPL-MCNC: 0.6 MG/DL (ref 0.5–1.4)
CTP QC/QA: YES
DIFFERENTIAL METHOD: ABNORMAL
EOSINOPHIL # BLD AUTO: 0.3 K/UL (ref 0–0.5)
EOSINOPHIL NFR BLD: 2.9 % (ref 0–8)
ERYTHROCYTE [DISTWIDTH] IN BLOOD BY AUTOMATED COUNT: 12.9 % (ref 11.5–14.5)
EST. GFR  (NO RACE VARIABLE): >60 ML/MIN/1.73 M^2
GLUCOSE SERPL-MCNC: 89 MG/DL (ref 70–110)
HCT VFR BLD AUTO: 43.5 % (ref 37–48.5)
HGB BLD-MCNC: 14 G/DL (ref 12–16)
IMM GRANULOCYTES # BLD AUTO: 0.03 K/UL (ref 0–0.04)
IMM GRANULOCYTES NFR BLD AUTO: 0.3 % (ref 0–0.5)
INR PPP: 1.1 (ref 0.8–1.2)
LYMPHOCYTES # BLD AUTO: 2.5 K/UL (ref 1–4.8)
LYMPHOCYTES NFR BLD: 26.5 % (ref 18–48)
MCH RBC QN AUTO: 31.9 PG (ref 27–31)
MCHC RBC AUTO-ENTMCNC: 32.2 G/DL (ref 32–36)
MCV RBC AUTO: 99 FL (ref 82–98)
MONOCYTES # BLD AUTO: 0.8 K/UL (ref 0.3–1)
MONOCYTES NFR BLD: 8.5 % (ref 4–15)
NEUTROPHILS # BLD AUTO: 5.7 K/UL (ref 1.8–7.7)
NEUTROPHILS NFR BLD: 61.2 % (ref 38–73)
NRBC BLD-RTO: 0 /100 WBC
PLATELET # BLD AUTO: 242 K/UL (ref 150–450)
PMV BLD AUTO: 10.7 FL (ref 9.2–12.9)
POTASSIUM SERPL-SCNC: 4 MMOL/L (ref 3.5–5.1)
PROTHROMBIN TIME: 11 SEC (ref 9–12.5)
RBC # BLD AUTO: 4.39 M/UL (ref 4–5.4)
SODIUM SERPL-SCNC: 138 MMOL/L (ref 136–145)
SPECIMEN OUTDATE: NORMAL
WBC # BLD AUTO: 9.27 K/UL (ref 3.9–12.7)

## 2023-04-21 PROCEDURE — 63600175 PHARM REV CODE 636 W HCPCS: Performed by: INTERNAL MEDICINE

## 2023-04-21 PROCEDURE — 93010 ELECTROCARDIOGRAM REPORT: CPT | Mod: ,,, | Performed by: INTERNAL MEDICINE

## 2023-04-21 PROCEDURE — 99152 MOD SED SAME PHYS/QHP 5/>YRS: CPT | Performed by: INTERNAL MEDICINE

## 2023-04-21 PROCEDURE — 25500020 PHARM REV CODE 255: Performed by: INTERNAL MEDICINE

## 2023-04-21 PROCEDURE — 86900 BLOOD TYPING SEROLOGIC ABO: CPT | Performed by: INTERNAL MEDICINE

## 2023-04-21 PROCEDURE — 93010 EKG 12-LEAD: ICD-10-PCS | Mod: 77,,, | Performed by: INTERNAL MEDICINE

## 2023-04-21 PROCEDURE — 93454 CORONARY ARTERY ANGIO S&I: CPT | Performed by: INTERNAL MEDICINE

## 2023-04-21 PROCEDURE — 93005 ELECTROCARDIOGRAM TRACING: CPT

## 2023-04-21 PROCEDURE — C1769 GUIDE WIRE: HCPCS | Performed by: INTERNAL MEDICINE

## 2023-04-21 PROCEDURE — 85610 PROTHROMBIN TIME: CPT | Performed by: INTERNAL MEDICINE

## 2023-04-21 PROCEDURE — 85025 COMPLETE CBC W/AUTO DIFF WBC: CPT | Performed by: INTERNAL MEDICINE

## 2023-04-21 PROCEDURE — 25000003 PHARM REV CODE 250: Performed by: INTERNAL MEDICINE

## 2023-04-21 PROCEDURE — 99152 MOD SED SAME PHYS/QHP 5/>YRS: CPT | Mod: ,,, | Performed by: INTERNAL MEDICINE

## 2023-04-21 PROCEDURE — 93010 EKG 12-LEAD: ICD-10-PCS | Mod: ,,, | Performed by: INTERNAL MEDICINE

## 2023-04-21 PROCEDURE — 85730 THROMBOPLASTIN TIME PARTIAL: CPT | Performed by: INTERNAL MEDICINE

## 2023-04-21 PROCEDURE — 99152 PR MOD CONSCIOUS SEDATION, SAME PHYS, 5+ YRS, FIRST 15 MIN: ICD-10-PCS | Mod: ,,, | Performed by: INTERNAL MEDICINE

## 2023-04-21 PROCEDURE — 36415 COLL VENOUS BLD VENIPUNCTURE: CPT | Performed by: INTERNAL MEDICINE

## 2023-04-21 PROCEDURE — 93454 CORONARY ARTERY ANGIO S&I: CPT | Mod: 26,,, | Performed by: INTERNAL MEDICINE

## 2023-04-21 PROCEDURE — 80048 BASIC METABOLIC PNL TOTAL CA: CPT | Performed by: INTERNAL MEDICINE

## 2023-04-21 PROCEDURE — 93010 ELECTROCARDIOGRAM REPORT: CPT | Mod: 77,,, | Performed by: INTERNAL MEDICINE

## 2023-04-21 PROCEDURE — 93454 PR CATH PLACE/CORONARY ANGIO, IMG SUPER/INTERP: ICD-10-PCS | Mod: 26,,, | Performed by: INTERNAL MEDICINE

## 2023-04-21 PROCEDURE — C1894 INTRO/SHEATH, NON-LASER: HCPCS | Performed by: INTERNAL MEDICINE

## 2023-04-21 PROCEDURE — 81025 URINE PREGNANCY TEST: CPT | Performed by: STUDENT IN AN ORGANIZED HEALTH CARE EDUCATION/TRAINING PROGRAM

## 2023-04-21 PROCEDURE — C1887 CATHETER, GUIDING: HCPCS | Performed by: INTERNAL MEDICINE

## 2023-04-21 RX ORDER — SODIUM CHLORIDE 9 MG/ML
INJECTION, SOLUTION INTRAVENOUS CONTINUOUS
Status: ACTIVE | OUTPATIENT
Start: 2023-04-21 | End: 2023-04-21

## 2023-04-21 RX ORDER — DIPHENHYDRAMINE HCL 50 MG
50 CAPSULE ORAL ONCE
Status: COMPLETED | OUTPATIENT
Start: 2023-04-21 | End: 2023-04-21

## 2023-04-21 RX ORDER — ACETAMINOPHEN 325 MG/1
650 TABLET ORAL EVERY 4 HOURS PRN
Status: DISCONTINUED | OUTPATIENT
Start: 2023-04-21 | End: 2023-04-21 | Stop reason: HOSPADM

## 2023-04-21 RX ORDER — MIDAZOLAM HYDROCHLORIDE 1 MG/ML
INJECTION INTRAMUSCULAR; INTRAVENOUS
Status: DISCONTINUED | OUTPATIENT
Start: 2023-04-21 | End: 2023-04-21 | Stop reason: HOSPADM

## 2023-04-21 RX ORDER — HEPARIN SODIUM 1000 [USP'U]/ML
INJECTION, SOLUTION INTRAVENOUS; SUBCUTANEOUS
Status: DISCONTINUED | OUTPATIENT
Start: 2023-04-21 | End: 2023-04-21 | Stop reason: HOSPADM

## 2023-04-21 RX ORDER — FENTANYL CITRATE 50 UG/ML
INJECTION, SOLUTION INTRAMUSCULAR; INTRAVENOUS
Status: DISCONTINUED | OUTPATIENT
Start: 2023-04-21 | End: 2023-04-21 | Stop reason: HOSPADM

## 2023-04-21 RX ORDER — LIDOCAINE HYDROCHLORIDE 10 MG/ML
INJECTION INFILTRATION; PERINEURAL
Status: DISCONTINUED | OUTPATIENT
Start: 2023-04-21 | End: 2023-04-21 | Stop reason: HOSPADM

## 2023-04-21 RX ORDER — ONDANSETRON 8 MG/1
8 TABLET, ORALLY DISINTEGRATING ORAL EVERY 8 HOURS PRN
Status: DISCONTINUED | OUTPATIENT
Start: 2023-04-21 | End: 2023-04-21 | Stop reason: HOSPADM

## 2023-04-21 RX ORDER — SODIUM CHLORIDE 0.9 % (FLUSH) 0.9 %
10 SYRINGE (ML) INJECTION
Status: DISCONTINUED | OUTPATIENT
Start: 2023-04-21 | End: 2023-04-21 | Stop reason: HOSPADM

## 2023-04-21 RX ORDER — HEPARIN SOD,PORCINE/0.9 % NACL 1000/500ML
INTRAVENOUS SOLUTION INTRAVENOUS
Status: DISCONTINUED | OUTPATIENT
Start: 2023-04-21 | End: 2023-04-21 | Stop reason: HOSPADM

## 2023-04-21 RX ADMIN — DIPHENHYDRAMINE HYDROCHLORIDE 50 MG: 50 CAPSULE ORAL at 02:04

## 2023-04-21 RX ADMIN — SODIUM CHLORIDE: 9 INJECTION, SOLUTION INTRAVENOUS at 02:04

## 2023-04-21 NOTE — PLAN OF CARE
Vasc band removed per protocol over 1 hour period. Patient tolerated well. Gauze/tegaderm dressing placed. Will monitor.

## 2023-04-21 NOTE — DISCHARGE SUMMARY
[facility] - Cath Lab  Interventional Cardiology  Discharge Summary      Patient Name: Shoaib Esqueda  MRN: [id number]  Admission Date: 4/21/2023  Hospital Length of Stay: 0 days  Discharge Date and Time:  04/21/2023 3:29 PM  Attending Physician: Esteban Restrepo MD  Discharging Provider: Jacobo Segura MD  Primary Care Physician: CHEY Duke MD    HPI:  No notes on file    Procedure(s) (LRB):  ANGIOGRAM, CORONARY ARTERY (N/A)     Indwelling Lines/Drains at time of discharge:  Lines/Drains/Airways       None                   Hospital Course:  Patient brought for a [facility] which revealed mild luminal irregularities but no significant lesions, please see report for full details. She was instructed to stop plavix and was then discharged in stable condition.       Goals of Care Treatment Preferences:           Pending Diagnostic Studies:       Procedure Component Value Units Date/Time    EKG 12-LEAD on arrival to floor [[id number]]     Order Status: Sent Lab Status: No result           No new Assessment & Plan notes have been filed under this hospital service since the last note was generated.  Service: Interventional Cardiology      Discharged Condition: good    Follow Up: Dr. Chopra cardiology clinic in 1-2 weeks    Patient Instructions:   No discharge procedures on file.  Medications:  Reconciled Home Medications:      Medication List        CONTINUE taking these medications      aspirin 81 MG EC tablet  Commonly known as: ECOTRIN  Take 1 tablet (81 mg total) by mouth once daily.     butalbital-acetaminophen-caffeine -40 mg -40 mg per tablet  Commonly known as: FIORICET, ESGIC  TAKE 1 TABLET BY MOUTH EVERY DAY AS NEEDED FOR HEADACHE     cetirizine 5 MG tablet  Commonly known as: ZYRTEC  Take 5 mg by mouth.     cyclobenzaprine 10 MG tablet  Commonly known as: FLEXERIL  Take 1/2 tablet nightly as needed     eletriptan 40 MG tablet  Commonly known as: RELPAX  Take 1 tablet (40 mg total) by mouth daily as  needed (migraine).     EMGALITY  mg/mL Pnij  Generic drug: galcanezumab-gnlm  ADMINISTER 1 ML( 120MG) UNDER THE SKIN EVERY 28 DAYS     nitroGLYCERIN 0.4 MG SL tablet  Commonly known as: NITROSTAT  Place 1 tablet (0.4 mg total) under the tongue every 5 (five) minutes as needed for Chest pain. Repeat twice as needed for a maximum total dose of 3 tablets. If still having chest pain, go to the emergency room.     valACYclovir 1000 MG tablet  Commonly known as: VALTREX  TAKE 1 TABLET(1000 MG) BY MOUTH EVERY DAY            STOP taking these medications      clopidogreL 75 mg tablet  Commonly known as: PLAVIX              Time spent on the discharge of patient: 20 minutes    Jacobo Segura MD  Interventional Cardiology  [facility] - Cath Lab

## 2023-04-21 NOTE — PLAN OF CARE
Patient arrived to room. PIV placed, labs sent. Admit assessment completed. Plan of care discussed with patient. Nurse call bell within reach.  Will monitor

## 2023-04-21 NOTE — BRIEF OP NOTE
Brief Operative Note:    : Esteban Restrepo MD     Referring Physician: Esteban Restrepo     All Operators: Surgeon(s):  MD Jacobo Robison MD John Dudley, MD     Preoperative Diagnosis: Coronary artery disease involving native heart, unspecified vessel or lesion type, unspecified whether angina present [I25.10]     Postop Diagnosis: Coronary artery disease involving native heart, unspecified vessel or lesion type, unspecified whether angina present [I25.10]    Treatments/Procedures: Procedure(s) (LRB):  ANGIOGRAM, CORONARY ARTERY (N/A)    Findings:Mild coronary disease is present. See catheterization report for full details.    -mild luminal irregularities     Estimated Blood loss: 20 cc    Specimens removed: No    Recommendations:   - Routine post-cath care as per orders  - IVF at 200 cc/hr for 2 hrs  - Continue medical management, Risk factor reduction, Follow-up with outpatient cardiologist      Jacobo Segura

## 2023-04-21 NOTE — PLAN OF CARE
Patient discharged per MD orders. Instructions given on medications, wound care, activity, signs of infection, when to call MD, and follow up appointments. Pt verbalized understanding. Telemetry and PIV removed. Patient and family refused transport, ambulated off unit.

## 2023-04-21 NOTE — Clinical Note
50 ml of contrast were injected throughout the case. 100 mL of contrast was the total wasted during the case. 150 mL was the total amount used during the case.
A pre-sedation assessment was completed by the physician immediately prior to sedation start. 
A pulse oximeter was placed on the patient's left index finger and left index finger.
An airway assessment has been completed by MD.
ID band present and verified. Family is in the lobby.
Phone report was given to post op RN
The ECG showed sinus rhythm.
The ECG showed sinus rhythm.
The catheter was inserted into the aorta.
The catheter was removed from the ostium   right coronary artery.
The catheter was repositioned into the ostium   left main. An angiography was performed of the left coronary arteries. Multiple views were taken.
The catheter was repositioned into the ostium   right coronary artery. An angiography was performed of the right coronary arteries. Multiple views were taken.
The defib pads were placed on the patient's chest and back.
The groin and right radial was prepped. The site was prepped with ChloraPrep. The site was clipped. The patient was draped. The patient was positioned supine.
The physician was paged.
The procedural consent was signed. A history and physical note was completed in the chart.
The radial band was applied to the right radial artery.
The sheath was inserted into the right radial artery.
The sheath was removed from the right radial artery.
The wire was inserted into the aorta.
The wire was inserted into the aortaAnd removed.
The wire was removed from the aorta.
dry, intact, no bleeding and no hematoma.
3

## 2023-04-21 NOTE — HOSPITAL COURSE
Patient brought for a LHC which revealed mild luminal irregularities but no significant lesions, please see report for full details. She was instructed to stop plavix and was then discharged in stable condition.

## 2023-04-21 NOTE — PLAN OF CARE
Received report from VANI Osorio. Patient s/p [facility], AAOx3. VSS, no c/o pain or discomfort at this time, resp even and unlabored. Vasc band to R wrist is CDI. No active bleeding. No hematoma noted. Post procedure protocol reviewed with patient and patient's family. Understanding verbalized. Family members at bedside. Nurse call bell within reach. Will continue to monitor per post procedure protocol.

## 2023-05-05 ENCOUNTER — PATIENT MESSAGE (OUTPATIENT)
Dept: CARDIOLOGY | Facility: CLINIC | Age: 53
End: 2023-05-05
Payer: COMMERCIAL

## 2023-05-11 ENCOUNTER — HOSPITAL ENCOUNTER (OUTPATIENT)
Dept: RADIOLOGY | Facility: OTHER | Age: 53
Discharge: HOME | End: 2023-05-11
Attending: INTERNAL MEDICINE
Payer: COMMERCIAL

## 2023-05-11 DIAGNOSIS — R93.89 ABNORMAL CT SCAN: ICD-10-CM

## 2023-05-11 PROCEDURE — 76700 US EXAM ABDOM COMPLETE: CPT | Mod: TC

## 2023-05-11 PROCEDURE — 76700 US ABDOMEN COMPLETE: ICD-10-PCS | Mod: 26,,, | Performed by: RADIOLOGY

## 2023-05-11 PROCEDURE — 76700 US EXAM ABDOM COMPLETE: CPT | Mod: 26,,, | Performed by: RADIOLOGY

## 2023-05-15 ENCOUNTER — PATIENT MESSAGE (OUTPATIENT)
Dept: CARDIOLOGY | Facility: CLINIC | Age: 53
End: 2023-05-15
Payer: COMMERCIAL

## 2023-05-22 ENCOUNTER — HOSPITAL ENCOUNTER (OUTPATIENT)
Dept: RADIOLOGY | Facility: OTHER | Age: 53
Discharge: HOME | End: 2023-05-22
Attending: INTERNAL MEDICINE
Payer: COMMERCIAL

## 2023-05-22 DIAGNOSIS — R91.8 PULMONARY NODULES: ICD-10-CM

## 2023-05-22 PROCEDURE — 71250 CT CHEST WITHOUT CONTRAST: ICD-10-PCS | Mod: 26,,, | Performed by: RADIOLOGY

## 2023-05-22 PROCEDURE — 71250 CT THORAX DX C-: CPT | Mod: 26,,, | Performed by: RADIOLOGY

## 2023-05-22 PROCEDURE — 71250 CT THORAX DX C-: CPT | Mod: TC

## 2023-05-24 ENCOUNTER — PATIENT MESSAGE (OUTPATIENT)
Dept: PULMONOLOGY | Facility: CLINIC | Age: 53
End: 2023-05-24
Payer: COMMERCIAL

## 2023-05-24 DIAGNOSIS — R91.8 PULMONARY NODULES: Primary | ICD-10-CM

## 2023-05-24 NOTE — PROGRESS NOTES
KR: please have this patient scheduled for the CT Chest in 6 months.  Thank you.    MsElise Yin,     I reviewed the images from your CT Chest.  The spots (nodules) we are following are the same size- which is a reassuring finding. Things that stay the same size over extended periods of time are generally benign.  I would recommend a repeat CT Chest in 6 months to make sure we are keeping a close eye on things.     Take care,     Dr. DENG

## 2023-05-25 ENCOUNTER — OFFICE VISIT (OUTPATIENT)
Dept: CARDIOLOGY | Facility: CLINIC | Age: 53
End: 2023-05-25
Payer: COMMERCIAL

## 2023-05-25 VITALS
DIASTOLIC BLOOD PRESSURE: 97 MMHG | HEIGHT: 67 IN | BODY MASS INDEX: 22.88 KG/M2 | HEART RATE: 82 BPM | WEIGHT: 145.75 LBS | SYSTOLIC BLOOD PRESSURE: 140 MMHG

## 2023-05-25 DIAGNOSIS — R94.39 ABNORMAL STRESS TEST: ICD-10-CM

## 2023-05-25 DIAGNOSIS — E28.2 PCOS (POLYCYSTIC OVARIAN SYNDROME): ICD-10-CM

## 2023-05-25 DIAGNOSIS — I10 PRIMARY HYPERTENSION: ICD-10-CM

## 2023-05-25 DIAGNOSIS — F17.200 SMOKER: ICD-10-CM

## 2023-05-25 DIAGNOSIS — R94.39 ABNORMAL CARDIOVASCULAR STRESS TEST: ICD-10-CM

## 2023-05-25 DIAGNOSIS — R07.89 CHEST PRESSURE: Primary | ICD-10-CM

## 2023-05-25 DIAGNOSIS — E78.2 MIXED HYPERLIPIDEMIA: ICD-10-CM

## 2023-05-25 DIAGNOSIS — I25.85 CARDIAC MICROVASCULAR DISEASE: ICD-10-CM

## 2023-05-25 PROCEDURE — 1159F MED LIST DOCD IN RCRD: CPT | Mod: CPTII,S$GLB,, | Performed by: INTERNAL MEDICINE

## 2023-05-25 PROCEDURE — 1159F PR MEDICATION LIST DOCUMENTED IN MEDICAL RECORD: ICD-10-PCS | Mod: CPTII,S$GLB,, | Performed by: INTERNAL MEDICINE

## 2023-05-25 PROCEDURE — 1160F RVW MEDS BY RX/DR IN RCRD: CPT | Mod: CPTII,S$GLB,, | Performed by: INTERNAL MEDICINE

## 2023-05-25 PROCEDURE — 1160F PR REVIEW ALL MEDS BY PRESCRIBER/CLIN PHARMACIST DOCUMENTED: ICD-10-PCS | Mod: CPTII,S$GLB,, | Performed by: INTERNAL MEDICINE

## 2023-05-25 PROCEDURE — 3080F DIAST BP >= 90 MM HG: CPT | Mod: CPTII,S$GLB,, | Performed by: INTERNAL MEDICINE

## 2023-05-25 PROCEDURE — 4010F ACE/ARB THERAPY RXD/TAKEN: CPT | Mod: CPTII,S$GLB,, | Performed by: INTERNAL MEDICINE

## 2023-05-25 PROCEDURE — 3077F SYST BP >= 140 MM HG: CPT | Mod: CPTII,S$GLB,, | Performed by: INTERNAL MEDICINE

## 2023-05-25 PROCEDURE — 3080F PR MOST RECENT DIASTOLIC BLOOD PRESSURE >= 90 MM HG: ICD-10-PCS | Mod: CPTII,S$GLB,, | Performed by: INTERNAL MEDICINE

## 2023-05-25 PROCEDURE — 99214 OFFICE O/P EST MOD 30 MIN: CPT | Mod: S$GLB,,, | Performed by: INTERNAL MEDICINE

## 2023-05-25 PROCEDURE — 99214 PR OFFICE/OUTPT VISIT, EST, LEVL IV, 30-39 MIN: ICD-10-PCS | Mod: S$GLB,,, | Performed by: INTERNAL MEDICINE

## 2023-05-25 PROCEDURE — 3077F PR MOST RECENT SYSTOLIC BLOOD PRESSURE >= 140 MM HG: ICD-10-PCS | Mod: CPTII,S$GLB,, | Performed by: INTERNAL MEDICINE

## 2023-05-25 PROCEDURE — 99999 PR PBB SHADOW E&M-EST. PATIENT-LVL III: ICD-10-PCS | Mod: PBBFAC,,, | Performed by: INTERNAL MEDICINE

## 2023-05-25 PROCEDURE — 4010F PR ACE/ARB THEARPY RXD/TAKEN: ICD-10-PCS | Mod: CPTII,S$GLB,, | Performed by: INTERNAL MEDICINE

## 2023-05-25 PROCEDURE — 3008F PR BODY MASS INDEX (BMI) DOCUMENTED: ICD-10-PCS | Mod: CPTII,S$GLB,, | Performed by: INTERNAL MEDICINE

## 2023-05-25 PROCEDURE — 99999 PR PBB SHADOW E&M-EST. PATIENT-LVL III: CPT | Mod: PBBFAC,,, | Performed by: INTERNAL MEDICINE

## 2023-05-25 PROCEDURE — 3008F BODY MASS INDEX DOCD: CPT | Mod: CPTII,S$GLB,, | Performed by: INTERNAL MEDICINE

## 2023-05-25 RX ORDER — LOSARTAN POTASSIUM 25 MG/1
25 TABLET ORAL DAILY
Qty: 90 TABLET | Refills: 3 | Status: SHIPPED | OUTPATIENT
Start: 2023-05-25 | End: 2023-09-27

## 2023-05-25 RX ORDER — ROSUVASTATIN CALCIUM 5 MG/1
5 TABLET, COATED ORAL DAILY
Qty: 90 TABLET | Refills: 3 | Status: SHIPPED | OUTPATIENT
Start: 2023-05-25 | End: 2023-12-01 | Stop reason: SDUPTHER

## 2023-05-25 NOTE — PROGRESS NOTES
"Subjective:   Patient ID:  Shoaib Esqueda is a 52 y.o. female who presents for follow-up of Hyperlipidemia    Chest pain in the center of the chest that is constant. Its staying the same right in the center of the chest., no association with breathing.   No SOB, no radiation.  Does not exercise regularly.  Patient works as a  and has a high stress job  1 ppd x 30 yrs  Quit 2 months ago.   Father had his valve replaced.  Going into Afib.   Irregular periods. Irregular periods since teen that has gotten better now and likely due to PCOS  Patient has breast implants since 1986  Patient has hep C.   "I have lived off diet coke and cigarettes all my life and now it can caught with me".   When she tries to walk too far her hands get swollen.      Cath    Minimal luminal irregularities in LCx on angiography.  The remainder of the coronary arteries appear normal on angiography.    The estimated blood loss was <50 mL.  Stress Echo  The left ventricle is normal in size with normal systolic function.  The estimated ejection fraction is 58%.  There are mild resting segmental left ventricular wall motion abnormalities.  There is abnormal septal wall motion.  Normal left ventricular diastolic function.  Normal right ventricular size with normal right ventricular systolic function.  Mild aortic regurgitation.  Normal central venous pressure (3 mmHg).  The estimated PA systolic pressure is 25 mmHg.  The test was stopped because the patient experienced fatigue.  The patient's exercise capacity was average.  During stress, the following significant arrhythmias were observed: occasional PVCs.  The ECG portion of this study is negative for myocardial ischemia.  The stress echo portion of this study is positive for myocardial ischemia.LVEF remains the same or slightly drops to low to mid 50s.Several walls stayed the same , eukinetic, and the distal anteroseptum worsened.  CT chest  There is mild fusiform dilatation of the " "ascending thoracic aorta which measures upper limits of normal in caliber at 3.9 cm  Calcium score   Your total calcium score is 0.  Very little coronary heart disease risk.     HPI:   Constant chest pain. Funny feeling in the center of the chest. 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  CrCl cannot be calculated (Patient's most recent lab result is older than the maximum 7 days allowed.).    Lab Results   Component Value Date     04/21/2023    K 4.0 04/21/2023     04/21/2023    CO2 25 04/21/2023    BUN 13 04/21/2023    CREATININE 0.6 04/21/2023    GLU 89 04/21/2023    HGBA1C 5.2 12/06/2022    AST 26 02/22/2023    ALT 26 02/22/2023    ALBUMIN 4.4 02/22/2023    PROT 8.0 02/22/2023    BILITOT 0.7 02/22/2023    WBC 9.27 04/21/2023    HGB 14.0 04/21/2023    HCT 43.5 04/21/2023    MCV 99 (H) 04/21/2023     04/21/2023    INR 1.1 04/21/2023    TSH 1.306 12/06/2022    CHOL 247 (H) 03/31/2023    HDL 71 03/31/2023    LDLCALC 159.0 03/31/2023    TRIG 85 03/31/2023       Current Outpatient Medications   Medication Sig    cetirizine (ZYRTEC) 5 MG tablet Take 5 mg by mouth.    losartan (COZAAR) 25 MG tablet Take 1 tablet (25 mg total) by mouth once daily.     No current facility-administered medications for this visit.       Review of Systems   Constitutional: Negative for chills, decreased appetite, malaise/fatigue, night sweats, weight gain and weight loss. "   Eyes:  Negative for blurred vision, double vision, visual disturbance and visual halos.   Cardiovascular:  Positive for chest pain. Negative for claudication, cyanosis, dyspnea on exertion, irregular heartbeat, leg swelling, near-syncope, orthopnea, palpitations, paroxysmal nocturnal dyspnea and syncope.   Respiratory:  Negative for cough, hemoptysis, snoring, sputum production and wheezing.    Endocrine: Negative for cold intolerance, heat intolerance, polydipsia and polyphagia.   Hematologic/Lymphatic: Negative for adenopathy and bleeding problem. Does not bruise/bleed easily.   Skin:  Negative for flushing, itching, poor wound healing and rash.   Musculoskeletal:  Negative for arthritis, back pain, falls, gout, joint pain, joint swelling, muscle cramps, muscle weakness, myalgias, neck pain and stiffness.   Gastrointestinal:  Negative for bloating, abdominal pain, anorexia, diarrhea, dysphagia, excessive appetite, flatus, hematemesis, jaundice, melena and nausea.   Genitourinary:  Negative for hesitancy and incomplete emptying.   Neurological:  Negative for aphonia, brief paralysis, difficulty with concentration, disturbances in coordination, excessive daytime sleepiness, dizziness, focal weakness, light-headedness, loss of balance and weakness.   Psychiatric/Behavioral:  Negative for altered mental status, depression, hallucinations, hypervigilance, memory loss, substance abuse and suicidal ideas. The patient does not have insomnia and is not nervous/anxious.      Objective:   Physical Exam  Constitutional:       General: She is not in acute distress.     Appearance: She is well-developed. She is not diaphoretic.   HENT:      Head: Normocephalic and atraumatic.      Nose: Nose normal.      Mouth/Throat:      Pharynx: No oropharyngeal exudate.   Eyes:      General: No scleral icterus.        Right eye: No discharge.         Left eye: No discharge.      Conjunctiva/sclera: Conjunctivae normal.      Pupils: Pupils  are equal, round, and reactive to light.   Neck:      Thyroid: No thyromegaly.      Vascular: No JVD.      Trachea: No tracheal deviation.   Cardiovascular:      Rate and Rhythm: Normal rate and regular rhythm.      Pulses: Intact distal pulses.      Heart sounds: Normal heart sounds. No murmur heard.    No friction rub. No gallop.   Pulmonary:      Effort: Pulmonary effort is normal. No respiratory distress.      Breath sounds: Normal breath sounds. No stridor. No wheezing or rales.   Chest:      Chest wall: No tenderness.   Abdominal:      General: Bowel sounds are normal. There is no distension.      Palpations: Abdomen is soft. There is no mass.      Tenderness: There is no abdominal tenderness. There is no guarding or rebound.   Musculoskeletal:         General: No tenderness. Normal range of motion.      Cervical back: Normal range of motion and neck supple.   Lymphadenopathy:      Cervical: No cervical adenopathy.   Skin:     General: Skin is warm.      Coloration: Skin is not pale.      Findings: No erythema or rash.   Neurological:      Mental Status: She is alert and oriented to person, place, and time.      Cranial Nerves: No cranial nerve deficit.      Motor: No abnormal muscle tone.      Coordination: Coordination normal.      Deep Tendon Reflexes: Reflexes are normal and symmetric. Reflexes normal.   Psychiatric:         Behavior: Behavior normal.         Thought Content: Thought content normal.         Judgment: Judgment normal.       Assessment:     1. Chest pressure    2. PCOS (polycystic ovarian syndrome)    3. Mixed hyperlipidemia    4. Cardiac microvascular disease    5. Abnormal stress test    6. Primary hypertension    7. Smoker      Plan:     Chest pain likely related to HTN will treat HTN and then evaluate microvascular disease  Shoaib was seen today for hyperlipidemia.    Diagnoses and all orders for this visit:    Chest pressure    PCOS (polycystic ovarian syndrome)    Mixed  hyperlipidemia    Cardiac microvascular disease  -     Cancel: Cardiac PET Scan Stress; Future    Abnormal stress test  -     Cancel: Cardiac PET Scan Stress; Future    Primary hypertension    Smoker    Other orders  -     losartan (COZAAR) 25 MG tablet; Take 1 tablet (25 mg total) by mouth once daily.      RTC 4 months

## 2023-05-28 DIAGNOSIS — G43.909 MIGRAINE WITHOUT STATUS MIGRAINOSUS, NOT INTRACTABLE, UNSPECIFIED MIGRAINE TYPE: ICD-10-CM

## 2023-05-28 RX ORDER — GALCANEZUMAB 120 MG/ML
INJECTION, SOLUTION SUBCUTANEOUS
Qty: 3 ML | Refills: 3 | Status: SHIPPED | OUTPATIENT
Start: 2023-05-28

## 2023-06-15 ENCOUNTER — HOSPITAL ENCOUNTER (OUTPATIENT)
Dept: RADIOLOGY | Facility: OTHER | Age: 53
Discharge: HOME | End: 2023-06-15
Attending: STUDENT IN AN ORGANIZED HEALTH CARE EDUCATION/TRAINING PROGRAM
Payer: COMMERCIAL

## 2023-06-15 DIAGNOSIS — Z12.31 ENCOUNTER FOR SCREENING MAMMOGRAM FOR BREAST CANCER: ICD-10-CM

## 2023-06-15 PROCEDURE — 77067 SCR MAMMO BI INCL CAD: CPT | Mod: 26,,, | Performed by: RADIOLOGY

## 2023-06-15 PROCEDURE — 77067 SCR MAMMO BI INCL CAD: CPT | Mod: TC

## 2023-06-15 PROCEDURE — 77063 BREAST TOMOSYNTHESIS BI: CPT | Mod: 26,,, | Performed by: RADIOLOGY

## 2023-06-15 PROCEDURE — 77063 MAMMO DIGITAL SCREENING BILAT WITH TOMO: ICD-10-PCS | Mod: 26,,, | Performed by: RADIOLOGY

## 2023-06-15 PROCEDURE — 77067 MAMMO DIGITAL SCREENING BILAT WITH TOMO: ICD-10-PCS | Mod: 26,,, | Performed by: RADIOLOGY

## 2023-06-27 ENCOUNTER — OFFICE VISIT (OUTPATIENT)
Dept: OBSTETRICS AND GYNECOLOGY | Facility: CLINIC | Age: 53
End: 2023-06-27
Attending: STUDENT IN AN ORGANIZED HEALTH CARE EDUCATION/TRAINING PROGRAM
Payer: COMMERCIAL

## 2023-06-27 ENCOUNTER — TELEPHONE (OUTPATIENT)
Dept: OBSTETRICS AND GYNECOLOGY | Facility: CLINIC | Age: 53
End: 2023-06-27
Payer: COMMERCIAL

## 2023-06-27 ENCOUNTER — LAB VISIT (OUTPATIENT)
Dept: LAB | Facility: OTHER | Age: 53
End: 2023-06-27
Attending: STUDENT IN AN ORGANIZED HEALTH CARE EDUCATION/TRAINING PROGRAM
Payer: COMMERCIAL

## 2023-06-27 VITALS — DIASTOLIC BLOOD PRESSURE: 76 MMHG | WEIGHT: 149.56 LBS | BODY MASS INDEX: 23.6 KG/M2 | SYSTOLIC BLOOD PRESSURE: 122 MMHG

## 2023-06-27 DIAGNOSIS — N95.1 SYMPTOMS, SUCH AS FLUSHING, SLEEPLESSNESS, HEADACHE, LACK OF CONCENTRATION, ASSOCIATED WITH THE MENOPAUSE: ICD-10-CM

## 2023-06-27 DIAGNOSIS — Z11.3 SCREEN FOR STD (SEXUALLY TRANSMITTED DISEASE): ICD-10-CM

## 2023-06-27 DIAGNOSIS — N95.1 MENOPAUSAL SYMPTOMS: ICD-10-CM

## 2023-06-27 DIAGNOSIS — E34.9 HORMONE IMBALANCE: ICD-10-CM

## 2023-06-27 DIAGNOSIS — R10.2 PELVIC PAIN IN FEMALE: ICD-10-CM

## 2023-06-27 DIAGNOSIS — Z11.3 SCREENING EXAMINATION FOR STD (SEXUALLY TRANSMITTED DISEASE): ICD-10-CM

## 2023-06-27 DIAGNOSIS — R10.2 PELVIC PAIN IN FEMALE: Primary | ICD-10-CM

## 2023-06-27 DIAGNOSIS — Z11.3 SCREENING EXAMINATION FOR STD (SEXUALLY TRANSMITTED DISEASE): Primary | ICD-10-CM

## 2023-06-27 LAB
ESTRADIOL SERPL-MCNC: 145 PG/ML
FSH SERPL-ACNC: 7.1 MIU/ML
HIV 1+2 AB+HIV1 P24 AG SERPL QL IA: NORMAL
RPR SER QL: NORMAL

## 2023-06-27 PROCEDURE — 3074F PR MOST RECENT SYSTOLIC BLOOD PRESSURE < 130 MM HG: ICD-10-PCS | Mod: CPTII,S$GLB,, | Performed by: STUDENT IN AN ORGANIZED HEALTH CARE EDUCATION/TRAINING PROGRAM

## 2023-06-27 PROCEDURE — 36415 COLL VENOUS BLD VENIPUNCTURE: CPT | Performed by: STUDENT IN AN ORGANIZED HEALTH CARE EDUCATION/TRAINING PROGRAM

## 2023-06-27 PROCEDURE — 99214 PR OFFICE/OUTPT VISIT, EST, LEVL IV, 30-39 MIN: ICD-10-PCS | Mod: S$GLB,,, | Performed by: STUDENT IN AN ORGANIZED HEALTH CARE EDUCATION/TRAINING PROGRAM

## 2023-06-27 PROCEDURE — 4010F PR ACE/ARB THEARPY RXD/TAKEN: ICD-10-PCS | Mod: CPTII,S$GLB,, | Performed by: STUDENT IN AN ORGANIZED HEALTH CARE EDUCATION/TRAINING PROGRAM

## 2023-06-27 PROCEDURE — 83001 ASSAY OF GONADOTROPIN (FSH): CPT | Performed by: STUDENT IN AN ORGANIZED HEALTH CARE EDUCATION/TRAINING PROGRAM

## 2023-06-27 PROCEDURE — 86592 SYPHILIS TEST NON-TREP QUAL: CPT | Performed by: STUDENT IN AN ORGANIZED HEALTH CARE EDUCATION/TRAINING PROGRAM

## 2023-06-27 PROCEDURE — 1159F MED LIST DOCD IN RCRD: CPT | Mod: CPTII,S$GLB,, | Performed by: STUDENT IN AN ORGANIZED HEALTH CARE EDUCATION/TRAINING PROGRAM

## 2023-06-27 PROCEDURE — 4010F ACE/ARB THERAPY RXD/TAKEN: CPT | Mod: CPTII,S$GLB,, | Performed by: STUDENT IN AN ORGANIZED HEALTH CARE EDUCATION/TRAINING PROGRAM

## 2023-06-27 PROCEDURE — 1159F PR MEDICATION LIST DOCUMENTED IN MEDICAL RECORD: ICD-10-PCS | Mod: CPTII,S$GLB,, | Performed by: STUDENT IN AN ORGANIZED HEALTH CARE EDUCATION/TRAINING PROGRAM

## 2023-06-27 PROCEDURE — 99214 OFFICE O/P EST MOD 30 MIN: CPT | Mod: S$GLB,,, | Performed by: STUDENT IN AN ORGANIZED HEALTH CARE EDUCATION/TRAINING PROGRAM

## 2023-06-27 PROCEDURE — 81514 NFCT DS BV&VAGINITIS DNA ALG: CPT | Performed by: STUDENT IN AN ORGANIZED HEALTH CARE EDUCATION/TRAINING PROGRAM

## 2023-06-27 PROCEDURE — 3008F PR BODY MASS INDEX (BMI) DOCUMENTED: ICD-10-PCS | Mod: CPTII,S$GLB,, | Performed by: STUDENT IN AN ORGANIZED HEALTH CARE EDUCATION/TRAINING PROGRAM

## 2023-06-27 PROCEDURE — 3074F SYST BP LT 130 MM HG: CPT | Mod: CPTII,S$GLB,, | Performed by: STUDENT IN AN ORGANIZED HEALTH CARE EDUCATION/TRAINING PROGRAM

## 2023-06-27 PROCEDURE — 87389 HIV-1 AG W/HIV-1&-2 AB AG IA: CPT | Performed by: STUDENT IN AN ORGANIZED HEALTH CARE EDUCATION/TRAINING PROGRAM

## 2023-06-27 PROCEDURE — 3008F BODY MASS INDEX DOCD: CPT | Mod: CPTII,S$GLB,, | Performed by: STUDENT IN AN ORGANIZED HEALTH CARE EDUCATION/TRAINING PROGRAM

## 2023-06-27 PROCEDURE — 99999 PR PBB SHADOW E&M-EST. PATIENT-LVL III: CPT | Mod: PBBFAC,,, | Performed by: STUDENT IN AN ORGANIZED HEALTH CARE EDUCATION/TRAINING PROGRAM

## 2023-06-27 PROCEDURE — 99999 PR PBB SHADOW E&M-EST. PATIENT-LVL III: ICD-10-PCS | Mod: PBBFAC,,, | Performed by: STUDENT IN AN ORGANIZED HEALTH CARE EDUCATION/TRAINING PROGRAM

## 2023-06-27 PROCEDURE — 3078F DIAST BP <80 MM HG: CPT | Mod: CPTII,S$GLB,, | Performed by: STUDENT IN AN ORGANIZED HEALTH CARE EDUCATION/TRAINING PROGRAM

## 2023-06-27 PROCEDURE — 3078F PR MOST RECENT DIASTOLIC BLOOD PRESSURE < 80 MM HG: ICD-10-PCS | Mod: CPTII,S$GLB,, | Performed by: STUDENT IN AN ORGANIZED HEALTH CARE EDUCATION/TRAINING PROGRAM

## 2023-06-27 PROCEDURE — 82670 ASSAY OF TOTAL ESTRADIOL: CPT | Performed by: STUDENT IN AN ORGANIZED HEALTH CARE EDUCATION/TRAINING PROGRAM

## 2023-06-27 RX ORDER — METRONIDAZOLE 500 MG/1
500 TABLET ORAL 2 TIMES DAILY
Qty: 14 TABLET | Refills: 0 | Status: SHIPPED | OUTPATIENT
Start: 2023-06-27 | End: 2023-07-04

## 2023-06-27 RX ORDER — PROGESTERONE 100 MG/1
100 CAPSULE ORAL NIGHTLY
COMMUNITY
End: 2023-08-23 | Stop reason: SDUPTHER

## 2023-06-27 NOTE — PROGRESS NOTES
Chief Complaint: U/S Results     HPI:      Shoaib Esqueda is a 52 y.o.  who presents today for follow up of U/S results.  Reports pelvic pain that has returned 2 months ago.  Had a cycle in October.  Symptoms then resolved.  No cycle again until May and .  Symptoms started again with cramping and pain.  Worse on L side.  Pain has occurred on and off.  She reports that in the past she took ciprox once which helped.  Symptoms have not occurred since October.  LMP: Patient's last menstrual period was 06/10/2023.  No other issues, problems, or complaints.     OB History          3    Para        Term                AB   3    Living             SAB        IAB   3    Ectopic        Multiple        Live Births   0               Past Medical History:   Diagnosis Date    Hepatitis C      Rx w/ chemo x 3 months.  Current  viral load is zero  Recent IUD (copper)     Migraine 2014    Nasal polyps 2014    PCOS (polycystic ovarian syndrome) 2014    PPD positive 10/27/2012     Past Surgical History:   Procedure Laterality Date    AUGMENTATION OF BREAST Bilateral     BREAST SURGERY      Implants    CORONARY ANGIOGRAPHY N/A 2023    Procedure: ANGIOGRAM, CORONARY ARTERY;  Surgeon: Esteban Restrepo MD;  Location: [facility] CATH LAB;  Service: Cardiology;  Laterality: N/A;  low bleeding risk 2.0%    SINUS SURGERY      x3     Social History     Socioeconomic History    Marital status: Single    Number of children: 0   Occupational History     Employer: [employer]    Occupation:  for [employer]     Employer: [employer]   Tobacco Use    Smoking status: Former     Packs/day: 0.50     Years: 15.00     Pack years: 7.50     Types: Cigarettes     Passive exposure: Past    Smokeless tobacco: Never    Tobacco comments:     Pt stop smoking 2023 after receiving CT Results   Substance and Sexual Activity    Alcohol use: Yes     Alcohol/week: 16.0 standard  drinks     Types: 2 Shots of liquor, 14 Standard drinks or equivalent per week     Comment: 2 shots per night    Drug use: No    Sexual activity: Yes     Partners: Male   Social History Narrative    Runs and lifts weights.    Father is a radiologist     Family History   Problem Relation Age of Onset    Hypertension Mother     Heart disease Father     Hypertension Father     Cancer Maternal Aunt     Cancer Maternal Uncle     Breast cancer Neg Hx     Ovarian cancer Neg Hx        Current Outpatient Medications:     cetirizine (ZYRTEC) 5 MG tablet, Take 5 mg by mouth., Disp: , Rfl:     galcanezumab-gnlm (EMGALITY PEN) 120 mg/mL PnIj, ADMINISTER 1 ML(120 MG) UNDER THE SKIN EVERY 28 DAYS, Disp: 3 mL, Rfl: 3    losartan (COZAAR) 25 MG tablet, Take 1 tablet (25 mg total) by mouth once daily., Disp: 90 tablet, Rfl: 3    progesterone (PROMETRIUM) 100 MG capsule, Take 100 mg by mouth once daily., Disp: , Rfl:     rosuvastatin (CRESTOR) 5 MG tablet, Take 1 tablet (5 mg total) by mouth once daily., Disp: 90 tablet, Rfl: 3    ROS:     GENERAL: Denies unintentional weight gain or weight loss. Feeling well overall.   SKIN: Denies rash or lesions.   HEENT: Denies headaches, or vision changes.   ABDOMEN: Denies abdominal pain, constipation, diarrhea, nausea, vomiting, change in appetite.  URINARY: Denies frequency, dysuria, hematuria.  NEUROLOGIC: Denies syncope or weakness.   PSYCHIATRIC: Denies depression, anxiety or mood swings.    Physical Exam:      PHYSICAL EXAM:  /76 (BP Location: Right arm, Patient Position: Sitting, BP Method: Medium (Manual))   Wt 67.8 kg (149 lb 9.3 oz)   LMP 06/10/2023   BMI 23.60 kg/m²   Body mass index is 23.6 kg/m².     APPEARANCE: Well nourished, well developed, in no acute distress.  PSYCH: Appropriate mood and affect.  :  External genitalia normal in appearance, vagina with white thin discharge, cervix without lesion or tenderness, uterus and adnexa mobile and without tenderness  SKIN: No  acne or hirsutism.      Assessment/Plan:     52 y.o.     Screening examination for STD (sexually transmitted disease)  -     HIV 1/2 Ag/Ab (4th Gen); Future; Expected date: 2023  -     RPR; Future; Expected date: 2023    Hormone imbalance    Symptoms, such as flushing, sleeplessness, headache, lack of concentration, associated with the menopause    Menopausal symptoms  -     ESTRADIOL; Future; Expected date: 2023  -     FOLLICLE STIMULATING HORMONE; Future; Expected date: 2023    Screen for STD (sexually transmitted disease)    Pelvic pain in female          Counselin.  Pelvic pain:  U/S results reviewed with patient.  No acute findings on exam today.  L ovary with < 2 cm ovarian cyst.  Uterus with multiple small fibroids.  Reviewed possible multiple etiologies of pelvic pain.  Also discussed treatment options for pain.  Reviewed expectant, medical, surgical options.  Again reviewed diagnostic laparoscopy.  Patient declines.  Would like to continue prometrium as this has helped her in the past.   Reviewed risks of medication with her other medical problems.  Labs ordered. Will also treat for bacterial vaginosis. Vulvar and perineal care reviewed.  R/B/A reviewed and all questions answered.  She also wants STD screening.  Also offered patient a 2nd opinion which she will consider.  Otherwise plan for follow up in 6-8 weeks or sooner if needed.  Recommend continued follow up with PCP and GI.  ED precautions reviewed.    2.  Follow up with PCP for other health maintenance.  3.  RTC for annual exam or sooner if needed.    Use of the [facility] Patient Portal discussed and encouraged during today's visit.

## 2023-06-27 NOTE — TELEPHONE ENCOUNTER
Pt states she has been having severe  intermittent pelvic pain since 6/10.  H/o ovarian cysts.  LMP 6/10.  She had an abd US on 5/11 which was negative. States the pain is severe, feels like she is dying and may need to go to the ER if she can't be seen today or tomorrow morning.  She is also requesting a rx for doxy.  Advised MD will want to see her first before starting on abx.  This happened 2 years ago along with severe bloating.      She was on Progesterone but stopped it because it made her over sedated.  She restarted it on Thursday to see if it would help with the pain.  Scheduled US today with Dr. BOYKIN.  Aware of prep and location.

## 2023-06-29 ENCOUNTER — TELEPHONE (OUTPATIENT)
Dept: OBSTETRICS AND GYNECOLOGY | Facility: CLINIC | Age: 53
End: 2023-06-29
Payer: COMMERCIAL

## 2023-06-29 DIAGNOSIS — R10.2 PELVIC PAIN: Primary | ICD-10-CM

## 2023-06-29 LAB
BACTERIAL VAGINOSIS DNA: NEGATIVE
CANDIDA GLABRATA DNA: NEGATIVE
CANDIDA KRUSEI DNA: NEGATIVE
CANDIDA RRNA VAG QL PROBE: NEGATIVE
T VAGINALIS RRNA GENITAL QL PROBE: NEGATIVE

## 2023-06-29 NOTE — TELEPHONE ENCOUNTER
Called pt to let her know her mammo is taking a little longer than usual, there was no answer I left a message to return my call

## 2023-06-29 NOTE — TELEPHONE ENCOUNTER
Spoke with patient and all questions answered.  Recommend ED evaluation if pain does not improve.    She would like to proceed with diagnostic laparoscopy once feeling better to evaluate causes.    Also needs mammogram read.      Requested Date: 7/18  Requested Time:  1P    Case Length:60 minutes    Surgeon: Linda Lopez      Assistant Surgeon: Fredi   Visit Type: Outpatient    LOCATION: [facility]    PROCEDURE:Diagnostic laparoscopy  Diagnosis:Pelvic pain  Anesthesia type: General   Comments/Special Equipment Needed:  Rep needed: No  Does the patient need a PreOp appointment with MD: Yes  U/S needed at pre-op: Yes  PCP clearance: Not Needed  When does she need her Post op appointment: 2 weeks in person  Do you need additional time blocked out from clinic? No  If so, what time do you want to be back in clinic? na  When can the patient go back to work? one week

## 2023-07-05 ENCOUNTER — TELEPHONE (OUTPATIENT)
Dept: OBSTETRICS AND GYNECOLOGY | Facility: CLINIC | Age: 53
End: 2023-07-05
Payer: COMMERCIAL

## 2023-07-05 DIAGNOSIS — N94.9 VAGINAL BURNING: Primary | ICD-10-CM

## 2023-07-05 RX ORDER — FLUCONAZOLE 150 MG/1
150 TABLET ORAL DAILY
Qty: 2 TABLET | Refills: 0 | Status: SHIPPED | OUTPATIENT
Start: 2023-07-05 | End: 2023-07-07

## 2023-07-05 NOTE — TELEPHONE ENCOUNTER
Pt had some vaginal burning last wee before starting on Flagyl but sine starting on Flagyl, the burning has worsened.  Requesting Diflucan.    Advised if no improvement, she will be swabbed at her preop appt in a few weeks.    Diflucan pended

## 2023-07-13 ENCOUNTER — OFFICE VISIT (OUTPATIENT)
Dept: OBSTETRICS AND GYNECOLOGY | Facility: CLINIC | Age: 53
End: 2023-07-13
Attending: STUDENT IN AN ORGANIZED HEALTH CARE EDUCATION/TRAINING PROGRAM
Payer: COMMERCIAL

## 2023-07-13 VITALS
DIASTOLIC BLOOD PRESSURE: 76 MMHG | HEIGHT: 66 IN | BODY MASS INDEX: 24.03 KG/M2 | WEIGHT: 149.5 LBS | SYSTOLIC BLOOD PRESSURE: 118 MMHG

## 2023-07-13 DIAGNOSIS — N89.8 VAGINAL DISCHARGE: Primary | ICD-10-CM

## 2023-07-13 DIAGNOSIS — N89.8 VAGINAL IRRITATION: ICD-10-CM

## 2023-07-13 PROCEDURE — 3074F PR MOST RECENT SYSTOLIC BLOOD PRESSURE < 130 MM HG: ICD-10-PCS | Mod: CPTII,S$GLB,, | Performed by: STUDENT IN AN ORGANIZED HEALTH CARE EDUCATION/TRAINING PROGRAM

## 2023-07-13 PROCEDURE — 3078F PR MOST RECENT DIASTOLIC BLOOD PRESSURE < 80 MM HG: ICD-10-PCS | Mod: CPTII,S$GLB,, | Performed by: STUDENT IN AN ORGANIZED HEALTH CARE EDUCATION/TRAINING PROGRAM

## 2023-07-13 PROCEDURE — 99214 OFFICE O/P EST MOD 30 MIN: CPT | Mod: S$GLB,,, | Performed by: STUDENT IN AN ORGANIZED HEALTH CARE EDUCATION/TRAINING PROGRAM

## 2023-07-13 PROCEDURE — 4010F PR ACE/ARB THEARPY RXD/TAKEN: ICD-10-PCS | Mod: CPTII,S$GLB,, | Performed by: STUDENT IN AN ORGANIZED HEALTH CARE EDUCATION/TRAINING PROGRAM

## 2023-07-13 PROCEDURE — 3078F DIAST BP <80 MM HG: CPT | Mod: CPTII,S$GLB,, | Performed by: STUDENT IN AN ORGANIZED HEALTH CARE EDUCATION/TRAINING PROGRAM

## 2023-07-13 PROCEDURE — 3008F PR BODY MASS INDEX (BMI) DOCUMENTED: ICD-10-PCS | Mod: CPTII,S$GLB,, | Performed by: STUDENT IN AN ORGANIZED HEALTH CARE EDUCATION/TRAINING PROGRAM

## 2023-07-13 PROCEDURE — 4010F ACE/ARB THERAPY RXD/TAKEN: CPT | Mod: CPTII,S$GLB,, | Performed by: STUDENT IN AN ORGANIZED HEALTH CARE EDUCATION/TRAINING PROGRAM

## 2023-07-13 PROCEDURE — 1159F PR MEDICATION LIST DOCUMENTED IN MEDICAL RECORD: ICD-10-PCS | Mod: CPTII,S$GLB,, | Performed by: STUDENT IN AN ORGANIZED HEALTH CARE EDUCATION/TRAINING PROGRAM

## 2023-07-13 PROCEDURE — 99999 PR PBB SHADOW E&M-EST. PATIENT-LVL III: CPT | Mod: PBBFAC,,, | Performed by: STUDENT IN AN ORGANIZED HEALTH CARE EDUCATION/TRAINING PROGRAM

## 2023-07-13 PROCEDURE — 81514 NFCT DS BV&VAGINITIS DNA ALG: CPT | Performed by: STUDENT IN AN ORGANIZED HEALTH CARE EDUCATION/TRAINING PROGRAM

## 2023-07-13 PROCEDURE — 1159F MED LIST DOCD IN RCRD: CPT | Mod: CPTII,S$GLB,, | Performed by: STUDENT IN AN ORGANIZED HEALTH CARE EDUCATION/TRAINING PROGRAM

## 2023-07-13 PROCEDURE — 3074F SYST BP LT 130 MM HG: CPT | Mod: CPTII,S$GLB,, | Performed by: STUDENT IN AN ORGANIZED HEALTH CARE EDUCATION/TRAINING PROGRAM

## 2023-07-13 PROCEDURE — 99214 PR OFFICE/OUTPT VISIT, EST, LEVL IV, 30-39 MIN: ICD-10-PCS | Mod: S$GLB,,, | Performed by: STUDENT IN AN ORGANIZED HEALTH CARE EDUCATION/TRAINING PROGRAM

## 2023-07-13 PROCEDURE — 3008F BODY MASS INDEX DOCD: CPT | Mod: CPTII,S$GLB,, | Performed by: STUDENT IN AN ORGANIZED HEALTH CARE EDUCATION/TRAINING PROGRAM

## 2023-07-13 PROCEDURE — 99999 PR PBB SHADOW E&M-EST. PATIENT-LVL III: ICD-10-PCS | Mod: PBBFAC,,, | Performed by: STUDENT IN AN ORGANIZED HEALTH CARE EDUCATION/TRAINING PROGRAM

## 2023-07-13 RX ORDER — DEXTROSE, SODIUM CHLORIDE, SODIUM LACTATE, POTASSIUM CHLORIDE, AND CALCIUM CHLORIDE 5; .6; .31; .03; .02 G/100ML; G/100ML; G/100ML; G/100ML; G/100ML
INJECTION, SOLUTION INTRAVENOUS CONTINUOUS
Status: CANCELLED | OUTPATIENT
Start: 2023-07-13

## 2023-07-13 RX ORDER — FAMOTIDINE 20 MG/1
20 TABLET, FILM COATED ORAL
Status: CANCELLED | OUTPATIENT
Start: 2023-07-13

## 2023-07-13 NOTE — H&P
[facility] [address]  History & Physical  Gynecology     SUBJECTIVE:      Chief Complaint/Reason for Admission: Pelvic pain, irregular menses     History of Present Illness:  Patient is a 52 y.o.  who presents with history of pelvic pain.  Reports a history of dysmenorrhea and pelvic pain.  Has been told she has endometriosis in the past.  Has never been confirmed surgically.  Cycles have spaced over the past few years.  Has had some heavier cycles recently.  Only last a few days.  Has not yet gone a full year with cycle.  Declines in office endometrial sampling.  Also declines treatment options for endometriosis or uterine fibroids.  Desires surgical evaluation with diagnostic laparoscopy first.  Declines further surgical management option.       (Not in a hospital admission)             Review of patient's allergies indicates:   Allergen Reactions    Pcn [penicillins]      Corticosteroids (glucocorticoids) Anxiety         OB History            3    Para        Term                AB   3    Living               SAB        IAB   3    Ectopic        Multiple        Live Births   0                       Past Medical History:   Diagnosis Date    Hepatitis C        Rx w/ chemo x 3 months.  Current  viral load is zero  Recent IUD (copper)     Migraine 2014    Nasal polyps 2014    PCOS (polycystic ovarian syndrome) 2014    PPD positive 10/27/2012            Past Surgical History:   Procedure Laterality Date    AUGMENTATION OF BREAST Bilateral     BREAST SURGERY         Implants    CORONARY ANGIOGRAPHY N/A 2023     Procedure: ANGIOGRAM, CORONARY ARTERY;  Surgeon: Esteban Restrepo MD;  Location: [facility] CATH LAB;  Service: Cardiology;  Laterality: N/A;  low bleeding risk 2.0%    SINUS SURGERY         x3            Family History   Problem Relation Age of Onset    Hypertension Mother      Heart disease Father      Hypertension Father      Cancer Maternal Aunt       "Cancer Maternal Uncle      Breast cancer Neg Hx      Ovarian cancer Neg Hx        Social History            Tobacco Use    Smoking status: Former       Packs/day: 0.50       Years: 15.00       Pack years: 7.50       Types: Cigarettes       Passive exposure: Past    Smokeless tobacco: Never    Tobacco comments:       Pt stop smoking 2/22/2023 after receiving CT Results   Substance Use Topics    Alcohol use: Yes       Alcohol/week: 16.0 standard drinks       Types: 2 Shots of liquor, 14 Standard drinks or equivalent per week       Comment: 2 shots per night    Drug use: No            ROS:      GENERAL: Denies unintentional weight gain or weight loss. Feeling well overall.   SKIN: Denies rash or lesions.   HEENT: Denies headaches, or vision changes.   CARDIOVASCULAR: Denies palpitations or chest pain.   RESPIRATORY: Denies shortness of breath or dyspnea on exertion.  BREASTS: Denies pain, lumps, or nipple discharge.   ABDOMEN: Denies abdominal pain, constipation, diarrhea, nausea, vomiting, change in appetite.  URINARY: Denies frequency, dysuria, hematuria.  NEUROLOGIC: Denies syncope or weakness.   PSYCHIATRIC: Denies depression, anxiety or mood swings.     Physical Exam:      PHYSICAL EXAM:  /76   Ht 5' 6" (1.676 m)   Wt 67.8 kg (149 lb 7.6 oz)   LMP 07/09/2023   BMI 24.13 kg/m²   Body mass index is 24.13 kg/m².      APPEARANCE: Well nourished, well developed, in no acute distress.  PSYCH: Appropriate mood and affect.  SKIN: No acne or hirsutism.  NECK: Neck symmetric without masses or thyromegaly.  NODES: No inguinal, axillary, or supraclavicular lymph node enlargement.  CHEST: Normal respiratory effort.    CARDIOVASCULAR:  Regular rate and rhythm.  LUNGS:  Clear to auscultation bilaterally.  BREASTS: Symmetrical, no skin changes or visible lesions.  No palpable masses or nipple discharge bilaterally.  ABDOMEN: Soft.  No tenderness or masses.    PELVIC: Normal external genitalia without lesions.  Normal " hair distribution.  Adequate perineal body, normal urethral meatus.  Vagina moist and well rugated without lesions or discharge.  Cervix pink, without lesions, discharge or tenderness.  No significant cystocele or rectocele.  Bimanual exam shows uterus to be normal size, regular, mobile and nontender.  Adnexa without masses or tenderness.    EXTREMITIES: No edema.  No tenderness to palpation.     U/S 6/2023 Uterus measures 8 x 3 x 5 cm with EMS 3 mm.  R ovary 2 x 1 x 1 cm.  L ovary 2 x 1 x 1 cm.  3 small fibroids measuring 2 cm, 1 cm, 1 cm.    ASSESSMENT/PLAN:      Dysmenorrhea  Pelvic Pain  AUB     Reviewed plan of care with patient.  Discussed U/S findings and treatment options.  She declines medical management of uterine fibroids or endometriosis.  Discussed alternative treatment options which she declines.  She would like to proceed with diagnostic laparoscopy to evaluate for any possible endometriosis and possible fulguration of endometriosis.  She declines oophorectomy or salpingectomy should any abnormalities exist (even if concern for malignancy is present).  R/B/A reviewed and standard of care discussed.  She voiced understanding.  She understands risks of oophorectomy, salpingectomy, hysterectomy should any damage happen surgically but does not want anything removed even if it appears abnormal.  Understands this may delay treatment, diagnosis, and care and could potentially worsen disease state.  Is ok with fulguration and biopsy.  Discussed that we would nor fulgurate areas that could cause further damage such as the bladder and bowel.  She is in agreement with plan.  Reviewed the surgical risks including but not limited to risk of postoperative pain and fever, bleeding requiring possible blood transfusion, infection, damage to surrounding tissue areas including but not limited to blood vessels, nerves, ovaries and loss of ovarian function, uterus and potential hysterectomy, bladder, ureters, and bowel.    Also discussed potential for larger abdominal incisions or laparotomy if needed.  Also reviewed further risks including but not limited to brain damage, injury, worsening of disease state, morbidity.  Reviewed increased risks with medical problems and tobacco use.  Also reviewed this may not solve problem or obtain a diagnosis.  She has voiced understanding.  Declines further eval at this time and desires to proceed with diagnostic laparoscopy.       Discussed evaluation of endometrial lining and indications.  She would like to proceed with hysteroscopy at time of surgery.   With the patient I had a full discussion of the risks, benefits, and alternatives of all procedures to be performed.  Discussed expectant management with serial ultrasounds and medical management.  Discussed risks of procedure including but not limited to risk of infection, postoperative pain and difficulty healing, hemorrhage requiring possible blood transfusion or need for hysterectomy, damage to surrounding tissue including but not limited to bladder, ureter, bowel, uterus, ovaries.  Also discussed possible need for laparoscopy or abdominal incision should complication occur, medical complications of surgery including but not limited to blood clot, pneumonia, prolonged hospitalization, death.  Discussed failure to resolve problem and need for further procedures depending on results of surgery.  Also reviewed recurrence of disease state.  All questions answered.    She was also counseled on risks of blood transfusion including but not limited to transfusion reaction and infection.  She is amenable to transfusion if needed.  All of her questions were answered, and she voiced understanding. She agrees with the plan of care; therefore, we will proceed with the surgery as scheduled.  Reviewed postop instructions and pain management.  Discussed surgical plan with patient and all questions answered.

## 2023-07-13 NOTE — H&P (VIEW-ONLY)
[facility] [place] [address]  History & Physical  Gynecology     SUBJECTIVE:      Chief Complaint/Reason for Admission: Pelvic pain, irregular menses     History of Present Illness:  Patient is a 52 y.o.  who presents with history of pelvic pain.  Reports a history of dysmenorrhea and pelvic pain.  Has been told she has endometriosis in the past.  Has never been confirmed surgically.  Cycles have spaced over the past few years.  Has had some heavier cycles recently.  Only last a few days.  Has not yet gone a full year with cycle.  Declines in office endometrial sampling.  Also declines treatment options for endometriosis or uterine fibroids.  Desires surgical evaluation with diagnostic laparoscopy first.  Declines further surgical management option.       (Not in a hospital admission)             Review of patient's allergies indicates:   Allergen Reactions    Pcn [penicillins]      Corticosteroids (glucocorticoids) Anxiety         OB History            3    Para        Term                AB   3    Living               SAB        IAB   3    Ectopic        Multiple        Live Births   0                       Past Medical History:   Diagnosis Date    Hepatitis C        Rx w/ chemo x 3 months.  Current  viral load is zero  Recent IUD (copper)     Migraine 2014    Nasal polyps 2014    PCOS (polycystic ovarian syndrome) 2014    PPD positive 10/27/2012            Past Surgical History:   Procedure Laterality Date    AUGMENTATION OF BREAST Bilateral     BREAST SURGERY         Implants    CORONARY ANGIOGRAPHY N/A 2023     Procedure: ANGIOGRAM, CORONARY ARTERY;  Surgeon: Esteban Restrepo MD;  Location: [facility] CATH LAB;  Service: Cardiology;  Laterality: N/A;  low bleeding risk 2.0%    SINUS SURGERY         x3            Family History   Problem Relation Age of Onset    Hypertension Mother      Heart disease Father      Hypertension Father      Cancer Maternal Aunt       "Cancer Maternal Uncle      Breast cancer Neg Hx      Ovarian cancer Neg Hx        Social History            Tobacco Use    Smoking status: Former       Packs/day: 0.50       Years: 15.00       Pack years: 7.50       Types: Cigarettes       Passive exposure: Past    Smokeless tobacco: Never    Tobacco comments:       Pt stop smoking 2/22/2023 after receiving CT Results   Substance Use Topics    Alcohol use: Yes       Alcohol/week: 16.0 standard drinks       Types: 2 Shots of liquor, 14 Standard drinks or equivalent per week       Comment: 2 shots per night    Drug use: No            ROS:      GENERAL: Denies unintentional weight gain or weight loss. Feeling well overall.   SKIN: Denies rash or lesions.   HEENT: Denies headaches, or vision changes.   CARDIOVASCULAR: Denies palpitations or chest pain.   RESPIRATORY: Denies shortness of breath or dyspnea on exertion.  BREASTS: Denies pain, lumps, or nipple discharge.   ABDOMEN: Denies abdominal pain, constipation, diarrhea, nausea, vomiting, change in appetite.  URINARY: Denies frequency, dysuria, hematuria.  NEUROLOGIC: Denies syncope or weakness.   PSYCHIATRIC: Denies depression, anxiety or mood swings.     Physical Exam:      PHYSICAL EXAM:  /76   Ht 5' 6" (1.676 m)   Wt 67.8 kg (149 lb 7.6 oz)   LMP 07/09/2023   BMI 24.13 kg/m²   Body mass index is 24.13 kg/m².      APPEARANCE: Well nourished, well developed, in no acute distress.  PSYCH: Appropriate mood and affect.  SKIN: No acne or hirsutism.  NECK: Neck symmetric without masses or thyromegaly.  NODES: No inguinal, axillary, or supraclavicular lymph node enlargement.  CHEST: Normal respiratory effort.    CARDIOVASCULAR:  Regular rate and rhythm.  LUNGS:  Clear to auscultation bilaterally.  BREASTS: Symmetrical, no skin changes or visible lesions.  No palpable masses or nipple discharge bilaterally.  ABDOMEN: Soft.  No tenderness or masses.    PELVIC: Normal external genitalia without lesions.  Normal " hair distribution.  Adequate perineal body, normal urethral meatus.  Vagina moist and well rugated without lesions or discharge.  Cervix pink, without lesions, discharge or tenderness.  No significant cystocele or rectocele.  Bimanual exam shows uterus to be normal size, regular, mobile and nontender.  Adnexa without masses or tenderness.    EXTREMITIES: No edema.  No tenderness to palpation.     U/S 6/2023 Uterus measures 8 x 3 x 5 cm with EMS 3 mm.  R ovary 2 x 1 x 1 cm.  L ovary 2 x 1 x 1 cm.  3 small fibroids measuring 2 cm, 1 cm, 1 cm.    ASSESSMENT/PLAN:      Dysmenorrhea  Pelvic Pain  AUB     Reviewed plan of care with patient.  Discussed U/S findings and treatment options.  She declines medical management of uterine fibroids or endometriosis.  Discussed alternative treatment options which she declines.  She would like to proceed with diagnostic laparoscopy to evaluate for any possible endometriosis and possible fulguration of endometriosis.  She declines oophorectomy or salpingectomy should any abnormalities exist (even if concern for malignancy is present).  R/B/A reviewed and standard of care discussed.  She voiced understanding.  She understands risks of oophorectomy, salpingectomy, hysterectomy should any damage happen surgically but does not want anything removed even if it appears abnormal.  Understands this may delay treatment, diagnosis, and care and could potentially worsen disease state.  Is ok with fulguration and biopsy.  Discussed that we would nor fulgurate areas that could cause further damage such as the bladder and bowel.  She is in agreement with plan.  Reviewed the surgical risks including but not limited to risk of postoperative pain and fever, bleeding requiring possible blood transfusion, infection, damage to surrounding tissue areas including but not limited to blood vessels, nerves, ovaries and loss of ovarian function, uterus and potential hysterectomy, bladder, ureters, and bowel.    Also discussed potential for larger abdominal incisions or laparotomy if needed.  Also reviewed further risks including but not limited to brain damage, injury, worsening of disease state, morbidity.  Reviewed increased risks with medical problems and tobacco use.  Also reviewed this may not solve problem or obtain a diagnosis.  She has voiced understanding.  Declines further eval at this time and desires to proceed with diagnostic laparoscopy.       Discussed evaluation of endometrial lining and indications.  She would like to proceed with hysteroscopy at time of surgery.   With the patient I had a full discussion of the risks, benefits, and alternatives of all procedures to be performed.  Discussed expectant management with serial ultrasounds and medical management.  Discussed risks of procedure including but not limited to risk of infection, postoperative pain and difficulty healing, hemorrhage requiring possible blood transfusion or need for hysterectomy, damage to surrounding tissue including but not limited to bladder, ureter, bowel, uterus, ovaries.  Also discussed possible need for laparoscopy or abdominal incision should complication occur, medical complications of surgery including but not limited to blood clot, pneumonia, prolonged hospitalization, death.  Discussed failure to resolve problem and need for further procedures depending on results of surgery.  Also reviewed recurrence of disease state.  All questions answered.    She was also counseled on risks of blood transfusion including but not limited to transfusion reaction and infection.  She is amenable to transfusion if needed.  All of her questions were answered, and she voiced understanding. She agrees with the plan of care; therefore, we will proceed with the surgery as scheduled.  Reviewed postop instructions and pain management.  Discussed surgical plan with patient and all questions answered.

## 2023-07-13 NOTE — PROGRESS NOTES
[facility] [place] [address]  History & Physical  Gynecology    SUBJECTIVE:     Chief Complaint/Reason for Admission: Pelvic pain, irregular menses    History of Present Illness:  Patient is a 52 y.o.  who presents with history of pelvic pain.  Reports a history of dysmenorrhea and pelvic pain.  Has been told she has endometriosis in the past.  Has never been confirmed surgically.  Cycles have spaced over the past few years.  Has had some heavier cycles recently.  Only last a few days.  Has not yet gone a full year with cycle.  Declines in office endometrial sampling.  Also declines treatment options for endometriosis or uterine fibroids.  Desires surgical evaluation with diagnostic laparoscopy first.  Declines further surgical management option.      (Not in a hospital admission)      Review of patient's allergies indicates:   Allergen Reactions    Pcn [penicillins]     Corticosteroids (glucocorticoids) Anxiety       OB History          3    Para        Term                AB   3    Living             SAB        IAB   3    Ectopic        Multiple        Live Births   0               Past Medical History:   Diagnosis Date    Hepatitis C      Rx w/ chemo x 3 months.  Current  viral load is zero  Recent IUD (copper)     Migraine 2014    Nasal polyps 2014    PCOS (polycystic ovarian syndrome) 2014    PPD positive 10/27/2012     Past Surgical History:   Procedure Laterality Date    AUGMENTATION OF BREAST Bilateral     BREAST SURGERY      Implants    CORONARY ANGIOGRAPHY N/A 2023    Procedure: ANGIOGRAM, CORONARY ARTERY;  Surgeon: Esteban Restrepo MD;  Location: [facility] CATH LAB;  Service: Cardiology;  Laterality: N/A;  low bleeding risk 2.0%    SINUS SURGERY      x3     Family History   Problem Relation Age of Onset    Hypertension Mother     Heart disease Father     Hypertension Father     Cancer Maternal Aunt     Cancer Maternal Uncle     Breast cancer Neg Hx      "Ovarian cancer Neg Hx      Social History     Tobacco Use    Smoking status: Former     Packs/day: 0.50     Years: 15.00     Pack years: 7.50     Types: Cigarettes     Passive exposure: Past    Smokeless tobacco: Never    Tobacco comments:     Pt stop smoking 2/22/2023 after receiving CT Results   Substance Use Topics    Alcohol use: Yes     Alcohol/week: 16.0 standard drinks     Types: 2 Shots of liquor, 14 Standard drinks or equivalent per week     Comment: 2 shots per night    Drug use: No         ROS:     GENERAL: Denies unintentional weight gain or weight loss. Feeling well overall.   SKIN: Denies rash or lesions.   HEENT: Denies headaches, or vision changes.   CARDIOVASCULAR: Denies palpitations or chest pain.   RESPIRATORY: Denies shortness of breath or dyspnea on exertion.  BREASTS: Denies pain, lumps, or nipple discharge.   ABDOMEN: Denies abdominal pain, constipation, diarrhea, nausea, vomiting, change in appetite.  URINARY: Denies frequency, dysuria, hematuria.  NEUROLOGIC: Denies syncope or weakness.   PSYCHIATRIC: Denies depression, anxiety or mood swings.    Physical Exam:      PHYSICAL EXAM:  /76   Ht 5' 6" (1.676 m)   Wt 67.8 kg (149 lb 7.6 oz)   LMP 07/09/2023   BMI 24.13 kg/m²   Body mass index is 24.13 kg/m².     APPEARANCE: Well nourished, well developed, in no acute distress.  PSYCH: Appropriate mood and affect.  SKIN: No acne or hirsutism.  NECK: Neck symmetric without masses or thyromegaly.  NODES: No inguinal, axillary, or supraclavicular lymph node enlargement.  CHEST: Normal respiratory effort.    CARDIOVASCULAR:  Regular rate and rhythm.  LUNGS:  Clear to auscultation bilaterally.  BREASTS: Symmetrical, no skin changes or visible lesions.  No palpable masses or nipple discharge bilaterally.  ABDOMEN: Soft.  No tenderness or masses.    PELVIC: Normal external genitalia without lesions.  Normal hair distribution.  Adequate perineal body, normal urethral meatus.  Vagina moist and " well rugated without lesions or discharge.  Cervix pink, without lesions, discharge or tenderness.  No significant cystocele or rectocele.  Bimanual exam shows uterus to be normal size, regular, mobile and nontender.  Adnexa without masses or tenderness.    EXTREMITIES: No edema.  No tenderness to palpation.    U/S 6/2023 Uterus measures 8 x 3 x 5 cm with EMS 3 mm.  R ovary 2 x 1 x 1 cm.  L ovary 2 x 1 x 1 cm.  3 small fibroids measuring 2 cm, 1 cm, 1 cm.    ASSESSMENT/PLAN:     Dysmenorrhea  Pelvic Pain  AUB    Reviewed plan of care with patient.  Discussed U/S findings and treatment options.  She declines medical management of uterine fibroids or endometriosis.  Discussed alternative treatment options which she declines.  She would like to proceed with diagnostic laparoscopy to evaluate for any possible endometriosis and possible fulguration of endometriosis.  She declines oophorectomy or salpingectomy should any abnormalities exist (even if concern for malignancy is present).  R/B/A reviewed and standard of care discussed.  She voiced understanding.  She understands risks of oophorectomy, salpingectomy, hysterectomy should any damage happen surgically but does not want anything removed even if it appears abnormal.  Understands this may delay treatment, diagnosis, and care and could potentially worsen disease state.  Is ok with fulguration and biopsy.  Discussed that we would nor fulgurate areas that could cause further damage such as the bladder and bowel.  She is in agreement with plan.  Reviewed the surgical risks including but not limited to risk of postoperative pain and fever, bleeding requiring possible blood transfusion, infection, damage to surrounding tissue areas including but not limited to blood vessels, nerves, ovaries and loss of ovarian function, uterus and potential hysterectomy, bladder, ureters, and bowel.   Also discussed potential for larger abdominal incisions or laparotomy if needed.  Also  reviewed further risks including but not limited to brain damage, injury, worsening of disease state, morbidity.  Reviewed increased risks with medical problems and tobacco use.  Also reviewed this may not solve problem or obtain a diagnosis.  She has voiced understanding.  Declines further eval at this time and desires to proceed with diagnostic laparoscopy.      Discussed evaluation of endometrial lining and indications.  She would like to proceed with hysteroscopy at time of surgery.   With the patient I had a full discussion of the risks, benefits, and alternatives of all procedures to be performed.  Discussed expectant management with serial ultrasounds and medical management.  Discussed risks of procedure including but not limited to risk of infection, postoperative pain and difficulty healing, hemorrhage requiring possible blood transfusion or need for hysterectomy, damage to surrounding tissue including but not limited to bladder, ureter, bowel, uterus, ovaries.  Also discussed possible need for laparoscopy or abdominal incision should complication occur, medical complications of surgery including but not limited to blood clot, pneumonia, prolonged hospitalization, death.  Discussed failure to resolve problem and need for further procedures depending on results of surgery.  Also reviewed recurrence of disease state.  All questions answered.    She was also counseled on risks of blood transfusion including but not limited to transfusion reaction and infection.  She is amenable to transfusion if needed.  All of her questions were answered, and she voiced understanding. She agrees with the plan of care; therefore, we will proceed with the surgery as scheduled.  Reviewed postop instructions and pain management.  Discussed surgical plan with patient and all questions answered.      Face to Face time with patient: 15  30 minutes of total time spent on the encounter, which includes face to face time and non-face to  face time preparing to see the patient (eg, review of tests), Obtaining and/or reviewing separately obtained history, Documenting clinical information in the electronic or other health record, Independently interpreting results (not separately reported) and communicating results to the patient/family/caregiver, or Care coordination (not separately reported).

## 2023-07-17 ENCOUNTER — TELEPHONE (OUTPATIENT)
Dept: OBSTETRICS AND GYNECOLOGY | Facility: CLINIC | Age: 53
End: 2023-07-17
Payer: COMMERCIAL

## 2023-07-17 ENCOUNTER — HOSPITAL ENCOUNTER (OUTPATIENT)
Dept: PREADMISSION TESTING | Facility: OTHER | Age: 53
Discharge: HOME | End: 2023-07-17
Attending: STUDENT IN AN ORGANIZED HEALTH CARE EDUCATION/TRAINING PROGRAM
Payer: COMMERCIAL

## 2023-07-17 ENCOUNTER — ANESTHESIA EVENT (OUTPATIENT)
Dept: SURGERY | Facility: OTHER | Age: 53
End: 2023-07-17
Payer: COMMERCIAL

## 2023-07-17 VITALS
HEART RATE: 77 BPM | HEIGHT: 66 IN | SYSTOLIC BLOOD PRESSURE: 132 MMHG | OXYGEN SATURATION: 100 % | TEMPERATURE: 98 F | DIASTOLIC BLOOD PRESSURE: 88 MMHG | WEIGHT: 145 LBS | RESPIRATION RATE: 19 BRPM | BODY MASS INDEX: 23.3 KG/M2

## 2023-07-17 DIAGNOSIS — N93.9 ABNORMAL UTERINE BLEEDING (AUB): Primary | ICD-10-CM

## 2023-07-17 LAB
ABO + RH BLD: NORMAL
BASOPHILS # BLD AUTO: 0.05 K/UL (ref 0–0.2)
BASOPHILS NFR BLD: 0.5 % (ref 0–1.9)
BLD GP AB SCN CELLS X3 SERPL QL: NORMAL
DIFFERENTIAL METHOD: ABNORMAL
EOSINOPHIL # BLD AUTO: 0.2 K/UL (ref 0–0.5)
EOSINOPHIL NFR BLD: 1.6 % (ref 0–8)
ERYTHROCYTE [DISTWIDTH] IN BLOOD BY AUTOMATED COUNT: 12.9 % (ref 11.5–14.5)
HCT VFR BLD AUTO: 43.5 % (ref 37–48.5)
HGB BLD-MCNC: 14 G/DL (ref 12–16)
IMM GRANULOCYTES # BLD AUTO: 0.05 K/UL (ref 0–0.04)
IMM GRANULOCYTES NFR BLD AUTO: 0.5 % (ref 0–0.5)
LYMPHOCYTES # BLD AUTO: 2 K/UL (ref 1–4.8)
LYMPHOCYTES NFR BLD: 18.8 % (ref 18–48)
MCH RBC QN AUTO: 32.6 PG (ref 27–31)
MCHC RBC AUTO-ENTMCNC: 32.2 G/DL (ref 32–36)
MCV RBC AUTO: 101 FL (ref 82–98)
MONOCYTES # BLD AUTO: 0.7 K/UL (ref 0.3–1)
MONOCYTES NFR BLD: 6.2 % (ref 4–15)
NEUTROPHILS # BLD AUTO: 7.9 K/UL (ref 1.8–7.7)
NEUTROPHILS NFR BLD: 72.4 % (ref 38–73)
NRBC BLD-RTO: 0 /100 WBC
PLATELET # BLD AUTO: 248 K/UL (ref 150–450)
PMV BLD AUTO: 10.2 FL (ref 9.2–12.9)
RBC # BLD AUTO: 4.3 M/UL (ref 4–5.4)
SPECIMEN OUTDATE: NORMAL
WBC # BLD AUTO: 10.85 K/UL (ref 3.9–12.7)

## 2023-07-17 PROCEDURE — 36415 COLL VENOUS BLD VENIPUNCTURE: CPT | Performed by: STUDENT IN AN ORGANIZED HEALTH CARE EDUCATION/TRAINING PROGRAM

## 2023-07-17 PROCEDURE — 86900 BLOOD TYPING SEROLOGIC ABO: CPT | Performed by: STUDENT IN AN ORGANIZED HEALTH CARE EDUCATION/TRAINING PROGRAM

## 2023-07-17 PROCEDURE — 85025 COMPLETE CBC W/AUTO DIFF WBC: CPT | Performed by: STUDENT IN AN ORGANIZED HEALTH CARE EDUCATION/TRAINING PROGRAM

## 2023-07-17 RX ORDER — MISOPROSTOL 200 UG/1
TABLET ORAL
Qty: 2 TABLET | Refills: 0 | Status: SHIPPED | OUTPATIENT
Start: 2023-07-17 | End: 2023-09-27

## 2023-07-17 RX ORDER — ACETAMINOPHEN 500 MG
1000 TABLET ORAL
Status: CANCELLED | OUTPATIENT
Start: 2023-07-17 | End: 2023-07-17

## 2023-07-17 RX ORDER — LIDOCAINE HYDROCHLORIDE 10 MG/ML
0.5 INJECTION, SOLUTION EPIDURAL; INFILTRATION; INTRACAUDAL; PERINEURAL ONCE
Status: CANCELLED | OUTPATIENT
Start: 2023-07-17 | End: 2023-07-17

## 2023-07-17 RX ORDER — ALBUTEROL SULFATE 2.5 MG/.5ML
2.5 SOLUTION RESPIRATORY (INHALATION)
Status: CANCELLED | OUTPATIENT
Start: 2023-07-17 | End: 2023-07-17

## 2023-07-17 RX ORDER — SODIUM CHLORIDE, SODIUM LACTATE, POTASSIUM CHLORIDE, CALCIUM CHLORIDE 600; 310; 30; 20 MG/100ML; MG/100ML; MG/100ML; MG/100ML
INJECTION, SOLUTION INTRAVENOUS CONTINUOUS
Status: CANCELLED | OUTPATIENT
Start: 2023-07-17

## 2023-07-17 NOTE — PRE ADMISSION SCREENING
Per Dr. Massey, Anesthesia has thyroid surgery scheduled; does not need to return for preadmit appt at [facility], [place]. Chlorhexidine given to patient for both surgeries.

## 2023-07-17 NOTE — ANESTHESIA PREPROCEDURE EVALUATION
07/17/2023  Shoaib Esqueda is a 52 y.o., female.      Pre-op Assessment    I have reviewed the Patient Summary Reports.     I have reviewed the Nursing Notes. I have reviewed the NPO Status.   I have reviewed the Medications.     Review of Systems  Anesthesia Hx:  Denies Family Hx of Anesthesia complications.  Personal Hx of Anesthesia complications, Post-Operative Nausea/Vomiting   Social:  Smoker    Hematology/Oncology:  Hematology Normal   Oncology Normal     EENT/Dental:EENT/Dental Normal   Cardiovascular:   Hypertension hyperlipidemia ECG has been reviewed. 3/23 Stress echo for CP positive for ischemia, cath neg for CAD   Pulmonary:  Pulmonary Normal Covid 1/23    Lung nodules x 2, incidental finding on w/u, followed by pulm with serial CTs   Renal/:  Renal/ Normal     Hepatic/GI:   Hepatitis (treated 2001), C    Musculoskeletal:  Musculoskeletal Normal    Neurological:   Headaches (hx migraines, controlled on emgality inj)    Endocrine:  Endocrine Normal    Dermatological:  Skin Normal    Psych:  Psychiatric Normal           Physical Exam  General: Well nourished, Cooperative, Alert and Oriented    Airway:  Mallampati: II   Mouth Opening: Normal  TM Distance: Normal  Neck ROM: Normal ROM    Dental:  Intact        Anesthesia Plan  Type of Anesthesia, risks & benefits discussed:    Anesthesia Type: Gen ETT  Intra-op Monitoring Plan: Standard ASA Monitors  Post Op Pain Control Plan: multimodal analgesia and IV/PO Opioids PRN  Induction:  IV  Airway Plan: Video  Informed Consent: Informed consent signed with the Patient and all parties understand the risks and agree with anesthesia plan.  All questions answered.   ASA Score: 2  Anesthesia Plan Notes: EKG/lab in [facility] 4/23 with cath    Ready For Surgery From Anesthesia Perspective.     .

## 2023-07-17 NOTE — PRE ADMISSION SCREENING
H/P information reviewed with patient and when entering reviewed; not allowed to and no information saved.

## 2023-07-17 NOTE — DISCHARGE INSTRUCTIONS
Information to Prepare you for your Surgery    PRE-ADMIT TESTING -  [phone]    [address] [facility]          Your surgery has been scheduled at [facility]. We are pleased to have the opportunity to serve you. For Further Information please call [phone].    On the day of surgery please report to the Information Desk on the 1st floor.    CONTACT YOUR PHYSICIAN'S OFFICE THE DAY PRIOR TO YOUR SURGERY TO OBTAIN YOUR ARRIVAL TIME.     The evening before surgery do not eat anything after 9 p.m. ( this includes hard candy, chewing gum and mints).  You may only have GATORADE, POWERADE AND WATER  from 9 p.m. until you leave your home.   DO NOT DRINK ANY LIQUIDS ON THE WAY TO THE HOSPITAL.      Why does your anesthesiologist allow you to drink Gatorade/Powerade before surgery?  Gatorade/Powerade helps to increase your comfort before surgery and to decrease your nausea after surgery. The carbohydrates in Gatorade/Powerade help reduce your body's stress response to surgery.  If you are a diabetic-drink only water prior to surgery.       Patients may have 2 visitors pre and post procedure. Only 2 visitors will be allowed in the Surgical building with the patient. No one under the age of 12 will be allowed into the facility.    SPECIAL MEDICATION INSTRUCTIONS: TAKE medications checked off by the Anesthesiologist on your Medication List.    Angiogram Patients: Take medications as instructed by your physician, including aspirin.     Surgery Patients:    If you take ASPIRIN - Your PHYSICIAN/SURGEON will need to inform you IF/OR when you need to stop taking aspirin prior to your surgery.     The week prior to surgery do not ot take any medications containing IBUPROFEN or NSAIDS ( Advil, Motrin, Goodys, BC, Aleve, Naproxen etc) If you are not sure if you should take a medicine please call your surgeon's office.  Ok to take Tylenol    Do Not Wear any make-up  (especially eye make-up) to surgery. Please remove any false eyelashes or eyelash extensions. If you arrive the day of surgery with makeup/eyelashes on you will be required to remove prior to surgery. (There is a risk of corneal abrasions if eye makeup/eyelash extensions are not removed)      Leave all valuables at home.   Do Not wear any jewelry or watches, including any metal in body piercings. Jewelry must be removed prior to coming to the hospital.  There is a possibility that rings that are unable to be removed may be cut off if they are on the surgical extremity.    Please remove all hair extensions, wigs, clips and any other metal accessories/ ornaments from your hair.  These items may pose a flammable/fire risk in Surgery and must be removed.    Do not shave your surgical area at least 5 days prior to your surgery. The surgical prep will be performed at the hospital according to Infection Control regulations.    Contact Lens must be removed before surgery. Either do not wear the contact lens or bring a case and solution for storage.  Please bring a container for eyeglasses or dentures as required.  Bring any paperwork your physician has provided, such as consent forms,  history and physicals, doctor's orders, etc.   Bring comfortable clothes that are loose fitting to wear upon discharge. Take into consideration the type of surgery being performed.  Maintain your diet as advised per your physician the day prior to surgery.      Adequate rest the night before surgery is advised.   Park in the Parking lot behind the hospital or in the [facility] across the street from the parking lot. Parking is complimentary.  If you will be discharged the same day as your procedure, please arrange for a responsible adult to drive you home or to accompany you if traveling by taxi.   YOU WILL NOT BE PERMITTED TO DRIVE OR TO LEAVE THE HOSPITAL ALONE AFTER SURGERY.   If you are being discharged the same day, it is  strongly recommended that you arrange for someone to remain with you for the first 24 hrs following your surgery.    The Surgeon will speak to your family/visitor after your surgery regarding the outcome of your surgery and post op care.  The Surgeon may speak to you after your surgery, but there is a possibility you may not remember the details.  Please check with your family members regarding the conversation with the Surgeon.    We strongly recommend whoever is bringing you home be present for discharge instructions.  This will ensure a thorough understanding for your post op home care.    ALL CHILDREN MUST ALWAYS BE ACCOMPANIED BY AN ADULT.    Visitors-Refer to current Visitor policy handouts.    Thank you for your cooperation.  The Staff of [facility].            Bathing Instructions with Hibiclens    Shower the evening before and morning of your procedure with Chlorhexidine (Hibiclens)  do not use Chlorhexidine on your face or genitals. Do not get in your eyes.  Wash your face with water and your regular face wash/soap  Use your regular shampoo  Apply Chlorhexidine (Hibiclens) directly on your skin or on a wet washcloth and wash gently. When showering: Move away from the shower stream when applying Chlorhexidine (Hibiclens) to avoid rinsing off too soon.  Rinse thoroughly with warm water  Do not dilute Chlorhexidine (Hibiclens)   Dry off as usual, do not use any deodorant, powder, body lotions, perfume, after shave or cologne.

## 2023-07-18 ENCOUNTER — HOSPITAL ENCOUNTER (OUTPATIENT)
Facility: OTHER | Age: 53
Discharge: HOME | End: 2023-07-18
Attending: STUDENT IN AN ORGANIZED HEALTH CARE EDUCATION/TRAINING PROGRAM | Admitting: STUDENT IN AN ORGANIZED HEALTH CARE EDUCATION/TRAINING PROGRAM
Payer: COMMERCIAL

## 2023-07-18 ENCOUNTER — ANESTHESIA (OUTPATIENT)
Dept: SURGERY | Facility: OTHER | Age: 53
End: 2023-07-18
Payer: COMMERCIAL

## 2023-07-18 DIAGNOSIS — Z98.890 POST-OPERATIVE STATE: Primary | ICD-10-CM

## 2023-07-18 DIAGNOSIS — N89.8 VAGINAL DISCHARGE: ICD-10-CM

## 2023-07-18 LAB
B-HCG UR QL: NEGATIVE
CTP QC/QA: YES

## 2023-07-18 PROCEDURE — D9220A PRA ANESTHESIA: Mod: ANES,,, | Performed by: ANESTHESIOLOGY

## 2023-07-18 PROCEDURE — 58558 PR HYSTEROSCOPY,W/ENDO BX: ICD-10-PCS | Mod: 51,,, | Performed by: STUDENT IN AN ORGANIZED HEALTH CARE EDUCATION/TRAINING PROGRAM

## 2023-07-18 PROCEDURE — 25000003 PHARM REV CODE 250: Performed by: ANESTHESIOLOGY

## 2023-07-18 PROCEDURE — 58662 LAPAROSCOPY EXCISE LESIONS: CPT | Mod: 82,,, | Performed by: OBSTETRICS & GYNECOLOGY

## 2023-07-18 PROCEDURE — D9220A PRA ANESTHESIA: Mod: CRNA,,, | Performed by: NURSE ANESTHETIST, CERTIFIED REGISTERED

## 2023-07-18 PROCEDURE — 36000708 HC OR TIME LEV III 1ST 15 MIN: Performed by: STUDENT IN AN ORGANIZED HEALTH CARE EDUCATION/TRAINING PROGRAM

## 2023-07-18 PROCEDURE — 58558 HYSTEROSCOPY BIOPSY: CPT | Mod: 82,51,, | Performed by: OBSTETRICS & GYNECOLOGY

## 2023-07-18 PROCEDURE — 37000008 HC ANESTHESIA 1ST 15 MINUTES: Performed by: STUDENT IN AN ORGANIZED HEALTH CARE EDUCATION/TRAINING PROGRAM

## 2023-07-18 PROCEDURE — D9220A PRA ANESTHESIA: ICD-10-PCS | Mod: ANES,,, | Performed by: ANESTHESIOLOGY

## 2023-07-18 PROCEDURE — 94761 N-INVAS EAR/PLS OXIMETRY MLT: CPT

## 2023-07-18 PROCEDURE — 63600175 PHARM REV CODE 636 W HCPCS: Performed by: NURSE ANESTHETIST, CERTIFIED REGISTERED

## 2023-07-18 PROCEDURE — 25000003 PHARM REV CODE 250: Performed by: STUDENT IN AN ORGANIZED HEALTH CARE EDUCATION/TRAINING PROGRAM

## 2023-07-18 PROCEDURE — 71000016 HC POSTOP RECOV ADDL HR: Performed by: STUDENT IN AN ORGANIZED HEALTH CARE EDUCATION/TRAINING PROGRAM

## 2023-07-18 PROCEDURE — 27201423 OPTIME MED/SURG SUP & DEVICES STERILE SUPPLY: Performed by: STUDENT IN AN ORGANIZED HEALTH CARE EDUCATION/TRAINING PROGRAM

## 2023-07-18 PROCEDURE — 58662 LAPAROSCOPY EXCISE LESIONS: CPT | Mod: ,,, | Performed by: STUDENT IN AN ORGANIZED HEALTH CARE EDUCATION/TRAINING PROGRAM

## 2023-07-18 PROCEDURE — 71000033 HC RECOVERY, INTIAL HOUR: Performed by: STUDENT IN AN ORGANIZED HEALTH CARE EDUCATION/TRAINING PROGRAM

## 2023-07-18 PROCEDURE — 94640 AIRWAY INHALATION TREATMENT: CPT

## 2023-07-18 PROCEDURE — 58558 PR HYSTEROSCOPY,W/ENDO BX: ICD-10-PCS | Mod: 82,51,, | Performed by: OBSTETRICS & GYNECOLOGY

## 2023-07-18 PROCEDURE — 58558 HYSTEROSCOPY BIOPSY: CPT | Mod: 51,,, | Performed by: STUDENT IN AN ORGANIZED HEALTH CARE EDUCATION/TRAINING PROGRAM

## 2023-07-18 PROCEDURE — 37000009 HC ANESTHESIA EA ADD 15 MINS: Performed by: STUDENT IN AN ORGANIZED HEALTH CARE EDUCATION/TRAINING PROGRAM

## 2023-07-18 PROCEDURE — 25000242 PHARM REV CODE 250 ALT 637 W/ HCPCS: Performed by: ANESTHESIOLOGY

## 2023-07-18 PROCEDURE — 88305 TISSUE EXAM BY PATHOLOGIST: ICD-10-PCS | Mod: 26,,, | Performed by: PATHOLOGY

## 2023-07-18 PROCEDURE — 71000015 HC POSTOP RECOV 1ST HR: Performed by: STUDENT IN AN ORGANIZED HEALTH CARE EDUCATION/TRAINING PROGRAM

## 2023-07-18 PROCEDURE — 88305 TISSUE EXAM BY PATHOLOGIST: CPT | Mod: 26,,, | Performed by: PATHOLOGY

## 2023-07-18 PROCEDURE — 25000003 PHARM REV CODE 250: Performed by: NURSE ANESTHETIST, CERTIFIED REGISTERED

## 2023-07-18 PROCEDURE — 63600175 PHARM REV CODE 636 W HCPCS: Performed by: ANESTHESIOLOGY

## 2023-07-18 PROCEDURE — 58662 PR LAP,FULGURATE/EXCISE LESIONS: ICD-10-PCS | Mod: ,,, | Performed by: STUDENT IN AN ORGANIZED HEALTH CARE EDUCATION/TRAINING PROGRAM

## 2023-07-18 PROCEDURE — 58662 PR LAP,FULGURATE/EXCISE LESIONS: ICD-10-PCS | Mod: 82,,, | Performed by: OBSTETRICS & GYNECOLOGY

## 2023-07-18 PROCEDURE — 88305 TISSUE EXAM BY PATHOLOGIST: CPT | Performed by: PATHOLOGY

## 2023-07-18 PROCEDURE — 81025 URINE PREGNANCY TEST: CPT | Performed by: STUDENT IN AN ORGANIZED HEALTH CARE EDUCATION/TRAINING PROGRAM

## 2023-07-18 PROCEDURE — 36000709 HC OR TIME LEV III EA ADD 15 MIN: Performed by: STUDENT IN AN ORGANIZED HEALTH CARE EDUCATION/TRAINING PROGRAM

## 2023-07-18 PROCEDURE — D9220A PRA ANESTHESIA: ICD-10-PCS | Mod: CRNA,,, | Performed by: NURSE ANESTHETIST, CERTIFIED REGISTERED

## 2023-07-18 RX ORDER — DIPHENHYDRAMINE HYDROCHLORIDE 50 MG/ML
INJECTION INTRAMUSCULAR; INTRAVENOUS
Status: DISCONTINUED | OUTPATIENT
Start: 2023-07-18 | End: 2023-07-18

## 2023-07-18 RX ORDER — OXYCODONE AND ACETAMINOPHEN 5; 325 MG/1; MG/1
1 TABLET ORAL EVERY 4 HOURS PRN
Qty: 10 TABLET | Refills: 0 | Status: SHIPPED | OUTPATIENT
Start: 2023-07-18 | End: 2023-09-27

## 2023-07-18 RX ORDER — MIDAZOLAM HYDROCHLORIDE 1 MG/ML
INJECTION INTRAMUSCULAR; INTRAVENOUS
Status: DISCONTINUED | OUTPATIENT
Start: 2023-07-18 | End: 2023-07-18

## 2023-07-18 RX ORDER — ACETAMINOPHEN 500 MG
1000 TABLET ORAL
Status: DISCONTINUED | OUTPATIENT
Start: 2023-07-18 | End: 2023-07-18 | Stop reason: HOSPADM

## 2023-07-18 RX ORDER — ONDANSETRON 4 MG/1
4 TABLET, FILM COATED ORAL EVERY 6 HOURS PRN
Qty: 20 TABLET | Refills: 0 | Status: SHIPPED | OUTPATIENT
Start: 2023-07-18 | End: 2023-09-27

## 2023-07-18 RX ORDER — DEXTROSE MONOHYDRATE AND SODIUM CHLORIDE 5; .45 G/100ML; G/100ML
INJECTION, SOLUTION INTRAVENOUS CONTINUOUS
Status: CANCELLED | OUTPATIENT
Start: 2023-07-18

## 2023-07-18 RX ORDER — LIDOCAINE HYDROCHLORIDE 10 MG/ML
0.5 INJECTION, SOLUTION EPIDURAL; INFILTRATION; INTRACAUDAL; PERINEURAL ONCE
Status: DISCONTINUED | OUTPATIENT
Start: 2023-07-18 | End: 2023-07-18 | Stop reason: HOSPADM

## 2023-07-18 RX ORDER — LIDOCAINE HYDROCHLORIDE 20 MG/ML
INJECTION INTRAVENOUS
Status: DISCONTINUED | OUTPATIENT
Start: 2023-07-18 | End: 2023-07-18

## 2023-07-18 RX ORDER — PROPOFOL 10 MG/ML
VIAL (ML) INTRAVENOUS
Status: DISCONTINUED | OUTPATIENT
Start: 2023-07-18 | End: 2023-07-18

## 2023-07-18 RX ORDER — AMOXICILLIN 250 MG
1 CAPSULE ORAL 2 TIMES DAILY
Status: CANCELLED | OUTPATIENT
Start: 2023-07-18

## 2023-07-18 RX ORDER — FAMOTIDINE 20 MG/1
20 TABLET, FILM COATED ORAL
Status: COMPLETED | OUTPATIENT
Start: 2023-07-18 | End: 2023-07-18

## 2023-07-18 RX ORDER — DEXAMETHASONE SODIUM PHOSPHATE 4 MG/ML
INJECTION, SOLUTION INTRA-ARTICULAR; INTRALESIONAL; INTRAMUSCULAR; INTRAVENOUS; SOFT TISSUE
Status: DISCONTINUED | OUTPATIENT
Start: 2023-07-18 | End: 2023-07-18

## 2023-07-18 RX ORDER — ALBUTEROL SULFATE 2.5 MG/.5ML
2.5 SOLUTION RESPIRATORY (INHALATION)
Status: COMPLETED | OUTPATIENT
Start: 2023-07-18 | End: 2023-07-18

## 2023-07-18 RX ORDER — HYDROMORPHONE HYDROCHLORIDE 2 MG/ML
0.4 INJECTION, SOLUTION INTRAMUSCULAR; INTRAVENOUS; SUBCUTANEOUS EVERY 5 MIN PRN
Status: DISCONTINUED | OUTPATIENT
Start: 2023-07-18 | End: 2023-07-18 | Stop reason: HOSPADM

## 2023-07-18 RX ORDER — SODIUM CHLORIDE, SODIUM LACTATE, POTASSIUM CHLORIDE, CALCIUM CHLORIDE 600; 310; 30; 20 MG/100ML; MG/100ML; MG/100ML; MG/100ML
INJECTION, SOLUTION INTRAVENOUS CONTINUOUS
Status: DISCONTINUED | OUTPATIENT
Start: 2023-07-18 | End: 2023-07-18 | Stop reason: HOSPADM

## 2023-07-18 RX ORDER — ONDANSETRON 2 MG/ML
INJECTION INTRAMUSCULAR; INTRAVENOUS
Status: DISCONTINUED | OUTPATIENT
Start: 2023-07-18 | End: 2023-07-18

## 2023-07-18 RX ORDER — DIPHENHYDRAMINE HCL 25 MG
25 CAPSULE ORAL EVERY 4 HOURS PRN
Status: CANCELLED | OUTPATIENT
Start: 2023-07-18

## 2023-07-18 RX ORDER — SODIUM CHLORIDE 0.9 % (FLUSH) 0.9 %
3 SYRINGE (ML) INJECTION
Status: DISCONTINUED | OUTPATIENT
Start: 2023-07-18 | End: 2023-07-18 | Stop reason: HOSPADM

## 2023-07-18 RX ORDER — PHENYLEPHRINE HYDROCHLORIDE 10 MG/ML
INJECTION INTRAVENOUS
Status: DISCONTINUED | OUTPATIENT
Start: 2023-07-18 | End: 2023-07-18

## 2023-07-18 RX ORDER — OXYCODONE HYDROCHLORIDE 5 MG/1
5 TABLET ORAL
Status: DISCONTINUED | OUTPATIENT
Start: 2023-07-18 | End: 2023-07-18 | Stop reason: HOSPADM

## 2023-07-18 RX ORDER — IBUPROFEN 800 MG/1
800 TABLET ORAL EVERY 8 HOURS PRN
Qty: 20 TABLET | Refills: 0 | Status: SHIPPED | OUTPATIENT
Start: 2023-07-18 | End: 2023-09-27

## 2023-07-18 RX ORDER — DEXTROSE, SODIUM CHLORIDE, SODIUM LACTATE, POTASSIUM CHLORIDE, AND CALCIUM CHLORIDE 5; .6; .31; .03; .02 G/100ML; G/100ML; G/100ML; G/100ML; G/100ML
INJECTION, SOLUTION INTRAVENOUS CONTINUOUS
Status: DISCONTINUED | OUTPATIENT
Start: 2023-07-18 | End: 2023-07-18 | Stop reason: HOSPADM

## 2023-07-18 RX ORDER — MEPERIDINE HYDROCHLORIDE 25 MG/ML
12.5 INJECTION INTRAMUSCULAR; INTRAVENOUS; SUBCUTANEOUS ONCE AS NEEDED
Status: COMPLETED | OUTPATIENT
Start: 2023-07-18 | End: 2023-07-18

## 2023-07-18 RX ORDER — HYDROMORPHONE HYDROCHLORIDE 2 MG/ML
INJECTION, SOLUTION INTRAMUSCULAR; INTRAVENOUS; SUBCUTANEOUS
Status: DISCONTINUED | OUTPATIENT
Start: 2023-07-18 | End: 2023-07-18

## 2023-07-18 RX ORDER — PROCHLORPERAZINE EDISYLATE 5 MG/ML
5 INJECTION INTRAMUSCULAR; INTRAVENOUS EVERY 30 MIN PRN
Status: DISCONTINUED | OUTPATIENT
Start: 2023-07-18 | End: 2023-07-18 | Stop reason: HOSPADM

## 2023-07-18 RX ORDER — FENTANYL CITRATE 50 UG/ML
INJECTION, SOLUTION INTRAMUSCULAR; INTRAVENOUS
Status: DISCONTINUED | OUTPATIENT
Start: 2023-07-18 | End: 2023-07-18

## 2023-07-18 RX ADMIN — PROPOFOL 200 MG: 10 INJECTION, EMULSION INTRAVENOUS at 09:07

## 2023-07-18 RX ADMIN — LIDOCAINE HYDROCHLORIDE 50 MG: 20 INJECTION, SOLUTION INTRAVENOUS at 09:07

## 2023-07-18 RX ADMIN — HYDROMORPHONE HYDROCHLORIDE 0.4 MG: 2 INJECTION INTRAMUSCULAR; INTRAVENOUS; SUBCUTANEOUS at 09:07

## 2023-07-18 RX ADMIN — FAMOTIDINE 20 MG: 20 TABLET, FILM COATED ORAL at 08:07

## 2023-07-18 RX ADMIN — ALBUTEROL SULFATE 2.5 MG: 2.5 SOLUTION RESPIRATORY (INHALATION) at 07:07

## 2023-07-18 RX ADMIN — ONDANSETRON HYDROCHLORIDE 4 MG: 2 INJECTION INTRAMUSCULAR; INTRAVENOUS at 09:07

## 2023-07-18 RX ADMIN — DIPHENHYDRAMINE HYDROCHLORIDE 12.5 MG: 50 INJECTION, SOLUTION INTRAMUSCULAR; INTRAVENOUS at 09:07

## 2023-07-18 RX ADMIN — SUGAMMADEX 150 MG: 100 INJECTION, SOLUTION INTRAVENOUS at 10:07

## 2023-07-18 RX ADMIN — PHENYLEPHRINE HYDROCHLORIDE 100 MCG: 10 INJECTION INTRAVENOUS at 09:07

## 2023-07-18 RX ADMIN — FENTANYL CITRATE 100 MCG: 50 INJECTION, SOLUTION INTRAMUSCULAR; INTRAVENOUS at 09:07

## 2023-07-18 RX ADMIN — CARBOXYMETHYLCELLULOSE SODIUM 2 DROP: 2.5 SOLUTION/ DROPS OPHTHALMIC at 09:07

## 2023-07-18 RX ADMIN — GLYCOPYRROLATE 0.2 MG: 0.2 INJECTION, SOLUTION INTRAMUSCULAR; INTRAVITREAL at 09:07

## 2023-07-18 RX ADMIN — DEXAMETHASONE SODIUM PHOSPHATE 4 MG: 4 INJECTION, SOLUTION INTRAMUSCULAR; INTRAVENOUS at 09:07

## 2023-07-18 RX ADMIN — MIDAZOLAM HYDROCHLORIDE 2 MG: 1 INJECTION, SOLUTION INTRAMUSCULAR; INTRAVENOUS at 09:07

## 2023-07-18 RX ADMIN — OXYCODONE HYDROCHLORIDE 5 MG: 5 TABLET ORAL at 10:07

## 2023-07-18 RX ADMIN — SODIUM CHLORIDE, SODIUM LACTATE, POTASSIUM CHLORIDE, AND CALCIUM CHLORIDE: .6; .31; .03; .02 INJECTION, SOLUTION INTRAVENOUS at 09:07

## 2023-07-18 RX ADMIN — MEPERIDINE HYDROCHLORIDE 12.5 MG: 25 INJECTION INTRAMUSCULAR; INTRAVENOUS; SUBCUTANEOUS at 10:07

## 2023-07-18 NOTE — DISCHARGE INSTRUCTIONS
Abdominal Laparoscopy Discharge Instructions      Activity  Limit your activity for 4-6 weeks.  Dont lift anything heavier than 5-10 pounds.  Avoid strenuous activities, such as mowing the lawn, vacuuming, or playing sports.  Limit your activity to short, slow walks. Gradually increase your pace and distance as you feel able.  Listen to your body. If an activity causes pain, stop.  Rest when you are tired.  Dont have sexual intercourse or use tampons or douches until your doctor says its safe to do so.    Home Care  Always keep your incision clean and dry  Shower as instructed in 24 hours. You may wash your incision gently with mild soap and warm water and pat dry.  Avoid tub baths, hot tubs and swimming pools until seen by your physician for a post-op follow up.  If you have steri strips they should fall off in 7-10 days.    If you have band aids covering your incisions change daily or when soiled.  The band aids may be removed post op day 1 if they are clean and dry.  Take your medication exactly as instructed by your doctor.  Return to your diet as you feel able. Eat a healthy, well-balanced diet.  Avoid constipation.  Use laxatives, stool softeners, or enemas as directed by your doctor.  Eat more high-fiber foods.  Drink 6-8 glasses of water every day, unless directed otherwise.  Follow-Up  Make a follow-up appointment as directed by our staff.       When to Call Your Doctor  Call your doctor right away if you have any of the following:  Fever above 101.5°F  (38.6°C) or chills  Bright red vaginal bleeding or a foul-smelling discharge  Vaginal bleeding that soaks more than one sanitary pad per hour  Trouble urinating or burning sensation when you urinate  Severe abdominal pain or bloating  Redness, swelling, or drainage at your incision site  Shortness of breath        ________________________________________________________________  FOR EMERGENCIES:  If any unusual problems or difficulties occur, contact                  ________________________ or the resident at [phone] (page ) or at the Clinic office, [phone].

## 2023-07-18 NOTE — ANESTHESIA PROCEDURE NOTES
Intubation    Date/Time: 7/18/2023 9:29 AM  Performed by: Maxine Nielson CRNA  Authorized by: Roger Perez MD     Intubation:     Induction:  Inhalational - mask    Intubated:  Postinduction    Mask Ventilation:  Easy mask    Attempts:  1    Attempted By:  CRNA    Method of Intubation:  Video laryngoscopy    Blade:  Yang 3    Laryngeal View Grade: Grade I - full view of cords      Difficult Airway Encountered?: No      Complications:  None    Airway Device:  Oral endotracheal tube    Airway Device Size:  7.5    Style/Cuff Inflation:  Cuffed    Inflation Amount (mL):  5    Tube secured:  22    Secured at:  The lips    Placement Verified By:  Capnometry    Complicating Factors:  None    Findings Post-Intubation:  BS equal bilateral

## 2023-07-18 NOTE — OR NURSING
VSS on RA. Pt states pain is tolerable. Dressings and PIV C/D/I. Meets Phase I discharge criteria. Ready for transfer to Phase II. No updates given to friend per pt request.

## 2023-07-18 NOTE — OPERATIVE NOTE ADDENDUM
Certification of Assistant at Surgery       Surgery Date: 7/18/2023     Participating Surgeons:  Surgeon(s) and Role:     * Linda Lopez MD - Primary     * Billie Rai MD - Assisting    Procedures:  Procedure(s) (LRB):  LAPAROSCOPY, DIAGNOSTIC (N/A)  HYSTEROSCOPY (N/A)    Assistant Surgeon's Certification of Necessity:  I understand that section 1842 (b) (6) (d) of the Social Security Act generally prohibits Medicare Part B reasonable charge payment for the services of assistants at surgery in teaching hospitals when qualified residents are available to furnish such services. I certify that the services for which payment is claimed were medically necessary, and that no qualified resident was available to perform the services. I further understand that these services are subject to post-payment review by the Medicare carrier.      Billie Rai MD    07/18/2023  10:03 AM

## 2023-07-18 NOTE — TRANSFER OF CARE
"Anesthesia Transfer of Care Note    Patient: Shoaib Esqueda    Procedure(s) Performed: Procedure(s) (LRB):  LAPAROSCOPY, DIAGNOSTIC (N/A)  HYSTEROSCOPY (N/A)    Patient location: PACU    Anesthesia Type: general    Transport from OR: Transported from OR on 2-3 L/min O2 by NC with adequate spontaneous ventilation    Post pain: adequate analgesia    Post assessment: no apparent anesthetic complications    Post vital signs: stable    Level of consciousness: awake    Nausea/Vomiting: no nausea/vomiting    Complications: none    Transfer of care protocol was followed      Last vitals:   Visit Vitals  BP (!) 158/74 (BP Location: Right arm, Patient Position: Lying)   Pulse 80   Temp 36.2 °C (97.1 °F) (Skin)   Resp 16   Ht 5' 6" (1.676 m)   Wt 65.8 kg (145 lb)   LMP 06/10/2023   SpO2 98%   Breastfeeding No   BMI 23.40 kg/m²     "

## 2023-07-18 NOTE — OP NOTE
[facility]  Operative Note    SUMMARY     Date of Procedure: 7/18/2023     Procedure: Procedure(s) (LRB):  LAPAROSCOPY, DIAGNOSTIC (N/A)  HYSTEROSCOPY (N/A)     Fulguration of endometriosis    Surgeon(s) and Role:     * Linda Rizzo MD - Primary     * Billie Rai MD - Assisting    Pre-Operative Diagnosis: Pelvic pain [R10.2]    Post-Operative Diagnosis: Post-Op Diagnosis Codes:     * Pelvic pain [R10.2], Endometriosis    Anesthesia: General    Technical Procedures Used: Diagnostic laparoscopy, fulguration of endometriosis    Description of the Findings of the Procedure: Laparoscopic findings included normal appearing liver edge and stomach.  Uterus normal in appearance with small subserosal leiomyomja approximately 1 cm.  Bilateral fallopian tubes and ovaries normal in appearance.  Small endometrial implants near L uterosacral ligaments.  L pelvic side wall with small endometrial implant.  L colon with filmy adhesion to L pelvic side wall. Hysteroscopic findings revealed normal appearing endometrial cavity and bilateral tubal ostial.      Complications: No    Estimated Blood Loss (EBL): * 10 mL *    Specimens:   Specimen (24h ago, onward)       Start     Ordered    07/18/23 1007  Specimen to Pathology, Surgery Gynecology and Obstetrics  Once        Comments: Pre-op Diagnosis: Pelvic pain [R10.2]Procedure(s):LAPAROSCOPY, DIAGNOSTICHYSTEROSCOPY Number of specimens: 1Name of specimens: 1. ENDOMETRIAL CURETTINGS     References:    Click here for ordering Quick Tip   Question Answer Comment   Procedure Type: Gynecology and Obstetrics    Which provider would you like to cc? LINDA RIZZO    Release to patient Immediate        07/18/23 1008                            Condition: Good    Disposition: PACU - hemodynamically stable.    Attestation: A qualified resident physician was not available    Procedure in detail:    Patient was taken to the operating room where  general anesthesia was obtained without difficulty. She was then prepped and draped in the normal sterile fashion. She was placed in the dorsal lithotomy position in Cesar stirrups. Her arms were tucked in the usual fashion. A bonilla was placed to gravity. A sponge stick was placed in the vagina.    Attention was then turned abdominally and a 5 mm skin incision was made with a knife. Towel clamps were used to elevate the abdomen.  A 5 mm visiport trocar was used to enter the abdomen under direct visualization. Intraperitoneal placement was confirmed.   The abdomen was then insufflated to 15 mmHg. The patient was then placed in trendelenburg position. The abdomen was the evaluated and the above findings were noted including smooth liver age, normal appearing stomach, normal appearing uterus, bilateral fallopian tubes, and ovaries.   Several small endometrial implants noted at L uterosacral ligament and L pelvic side wall.  A 5 mm bladeless trocar was placed in the LLQ in the usual fashion. The pelvis was again examined.  The L endometrial implants were fulgurated without difficulty using electrocautery.  Hemostasis was noted.  The L pelvis side wall implant was also coagulated with electrocautery.  The filmy adhesions to the sigmoid colon were then able to easily  with blunt and sharp dissection.  No other endometrial implants or abnormalities present.  Hemostasis was noted throughout.    The trocars were then removed. The skin incisions were closed using 4-0 Monocryl.      Weighted speculum placed in the posterior vagina and the anterior vagina was elevated with viktoriya retractor. The anterior lip of the cervix was grasped with the single tooth tenaculum. The uterus was then sounded and measured 7 cm in length. The dilators were used to dilate the cervix enough to allow introduction of the hysteroscope. The hysteroscope was introduced and normal appearing uterine cavity was visualized.  The uterine cavity was  noted to be free of polyps or other anomalies. Bilateral tubal ostia were visualized.  All instruments were then removed from the vagina. The patient tolerated procedure well. Sponge lap and needle counts were correct x 2.  Vu catheter removed at end of procedure.    The patient tolerated the procedure well.  She was awakened from general anesthesia and taken to the recovery room in a stable condition.      A qualified resident was not available for this procedure.

## 2023-07-18 NOTE — ANESTHESIA POSTPROCEDURE EVALUATION
Anesthesia Post Evaluation    Patient: Shoaib Esqueda    Procedure(s) Performed: Procedure(s) (LRB):  LAPAROSCOPY, DIAGNOSTIC (N/A)  HYSTEROSCOPY (N/A)    Final Anesthesia Type: general      Patient location during evaluation: PACU  Patient participation: Yes- Able to Participate  Level of consciousness: awake and alert  Post-procedure vital signs: reviewed and stable  Pain management: adequate  Airway patency: patent    PONV status at discharge: No PONV  Anesthetic complications: no      Cardiovascular status: blood pressure returned to baseline  Respiratory status: unassisted  Hydration status: euvolemic  Follow-up not needed.          Vitals Value Taken Time   /88 07/18/23 1102   Temp 36.3 °C (97.4 °F) 07/18/23 1102   Pulse 74 07/18/23 1102   Resp 16 07/18/23 1102   SpO2 98 % 07/18/23 1102         Event Time   Out of Recovery 11:00:00         Pain/Lola Score: Pain Rating Prior to Med Admin: 4 (7/18/2023 10:33 AM)  Pain Rating Post Med Admin: 3 (7/18/2023 11:02 AM)  Lola Score: 10 (7/18/2023 11:02 AM)

## 2023-07-18 NOTE — PLAN OF CARE
Shoaib GABRIEL Esqueda has met all discharge criteria from Phase II. Vital Signs are stable, ambulating  without difficulty. Discharge instructions given, patient verbalized understanding. Discharged from facility via wheelchair in stable condition.

## 2023-07-19 ENCOUNTER — TELEPHONE (OUTPATIENT)
Dept: OBSTETRICS AND GYNECOLOGY | Facility: CLINIC | Age: 53
End: 2023-07-19
Payer: COMMERCIAL

## 2023-07-19 VITALS
BODY MASS INDEX: 23.3 KG/M2 | OXYGEN SATURATION: 99 % | RESPIRATION RATE: 18 BRPM | DIASTOLIC BLOOD PRESSURE: 84 MMHG | HEIGHT: 66 IN | HEART RATE: 54 BPM | SYSTOLIC BLOOD PRESSURE: 145 MMHG | TEMPERATURE: 97 F | WEIGHT: 145 LBS

## 2023-07-19 NOTE — TELEPHONE ENCOUNTER
----- Message from Sierra Guerra RN sent at 7/19/2023 11:07 AM CDT -----  Regarding: Antibiotic?  The patient wants to know if you can prescribed an antibiotic, because of her neutrophil count. Thank you!

## 2023-07-19 NOTE — TELEPHONE ENCOUNTER
"Returned pt's phone call.  Sx yesterday.    Pt saw that her ANC lab was elevated.  States that she may need an abx in order for it to decrease.  Has taken Cipro in the past but that has made her "dragged".  But willing to take whatever is recommended.  Pt reports feeling better than before surgery.  No longer can pinpoint where the pain is.  Hasn't had a bowel movement yet but has been eating a light diet.  Aware the Dr. Lopez was out of the office.    Allergies and Pharm UTD  "

## 2023-07-20 ENCOUNTER — TELEPHONE (OUTPATIENT)
Dept: OBSTETRICS AND GYNECOLOGY | Facility: HOSPITAL | Age: 53
End: 2023-07-20
Payer: COMMERCIAL

## 2023-07-20 NOTE — DISCHARGE SUMMARY
[facility] ([place])  Discharge Summary  OBGYN    Admission date: 7/18/2023  Discharge date: 7/18/2023    Admit Dx:      Patient Active Problem List   Diagnosis    PPD positive    Migraine    Nasal polyps    PCOS (polycystic ovarian syndrome)    Smoker    Hepatitis C    Tremor of left hand    Teeth clenching    Pulmonary nodules    Abnormal cardiovascular stress test    Mixed hyperlipidemia     Discharge Dx:    Patient Active Problem List   Diagnosis    PPD positive    Migraine    Nasal polyps    PCOS (polycystic ovarian syndrome)    Smoker    Hepatitis C    Tremor of left hand    Teeth clenching    Pulmonary nodules    Abnormal cardiovascular stress test    Mixed hyperlipidemia       Attending Physician: Linda Lopez   Discharge Provider: Linda Lopez    Procedures Performed: Procedure(s) (LRB):  LAPAROSCOPY, DIAGNOSTIC (N/A)  HYSTEROSCOPY (N/A)    Hospital Course: Patient was admitted on 7/18/2023 to undergo diagnostic laparoscopy and hysteroscopy secondary to pelvic pain and AUB.  Procedure was uncomplicated, for full details see operative report. Barring any unforseen incidents, patient will be discharged home when she is able to ambulate, void, and tolerate PO intake.    Consults: none    Significant Diagnostic Studies: none    Discharged Condition: stable    Disposition: Home    Follow Up:       Medications:  Discharge Medication List as of 7/18/2023 12:20 PM        START taking these medications    Details   ibuprofen (ADVIL,MOTRIN) 800 MG tablet Take 1 tablet (800 mg total) by mouth every 8 (eight) hours as needed for Pain (take with food)., Starting Tue 7/18/2023, Normal      ondansetron (ZOFRAN) 4 MG tablet Take 1 tablet (4 mg total) by mouth every 6 (six) hours as needed for Nausea., Starting Tue 7/18/2023, Normal      oxyCODONE-acetaminophen (PERCOCET) 5-325 mg per tablet Take 1 tablet by mouth every 4 (four) hours as needed for Pain., Starting Tue 7/18/2023, Normal            CONTINUE these medications which have NOT CHANGED    Details   cetirizine (ZYRTEC) 5 MG tablet Take 5 mg by mouth once daily., Historical Med      galcanezumab-gnlm (EMGALITY PEN) 120 mg/mL PnIj ADMINISTER 1 ML(120 MG) UNDER THE SKIN EVERY 28 DAYS, Normal      losartan (COZAAR) 25 MG tablet Take 1 tablet (25 mg total) by mouth once daily., Starting Thu 5/25/2023, Until Fri 5/24/2024, Normal      miSOPROStoL (CYTOTEC) 200 MCG Tab Place 200 mcg vaginally the night before the procedure and 200 mcg vaginally the day of the procedure., Normal      progesterone (PROMETRIUM) 100 MG capsule Take 100 mg by mouth every evening., Historical Med      rosuvastatin (CRESTOR) 5 MG tablet Take 1 tablet (5 mg total) by mouth once daily., Starting Thu 5/25/2023, Until Fri 5/24/2024, Normal             Discharge Procedure Orders   Diet general     No dressing needed     Call MD for:  temperature >100.4     Call MD for:  persistent nausea and vomiting     Call MD for:  severe uncontrolled pain     Call MD for:  difficulty breathing, headache or visual disturbances     Call MD for:  redness, tenderness, or signs of infection (pain, swelling, redness, odor or green/yellow discharge around incision site)     Call MD for:  hives     Call MD for:  persistent dizziness or light-headedness     Call MD for:  extreme fatigue     Call MD for:     Activity as tolerated

## 2023-07-20 NOTE — TELEPHONE ENCOUNTER
Phone call made to patient to follow up after surgery.  She is doing well.  No issues or concerns.  Ambulating, voiding, tolerating diet, passing flatus.  Pain well controlled.  She will call with any issus or concerns.  All questions answered.

## 2023-07-25 LAB
FINAL PATHOLOGIC DIAGNOSIS: NORMAL
GROSS: NORMAL
Lab: NORMAL

## 2023-07-31 ENCOUNTER — OFFICE VISIT (OUTPATIENT)
Dept: OBSTETRICS AND GYNECOLOGY | Facility: CLINIC | Age: 53
End: 2023-07-31
Attending: STUDENT IN AN ORGANIZED HEALTH CARE EDUCATION/TRAINING PROGRAM
Payer: COMMERCIAL

## 2023-07-31 VITALS
HEIGHT: 66 IN | DIASTOLIC BLOOD PRESSURE: 86 MMHG | SYSTOLIC BLOOD PRESSURE: 132 MMHG | WEIGHT: 146.63 LBS | BODY MASS INDEX: 23.57 KG/M2

## 2023-07-31 DIAGNOSIS — Z09 POSTOP CHECK: Primary | ICD-10-CM

## 2023-07-31 PROCEDURE — 99999 PR PBB SHADOW E&M-EST. PATIENT-LVL III: CPT | Mod: PBBFAC,,, | Performed by: STUDENT IN AN ORGANIZED HEALTH CARE EDUCATION/TRAINING PROGRAM

## 2023-07-31 PROCEDURE — 3075F SYST BP GE 130 - 139MM HG: CPT | Mod: CPTII,S$GLB,, | Performed by: STUDENT IN AN ORGANIZED HEALTH CARE EDUCATION/TRAINING PROGRAM

## 2023-07-31 PROCEDURE — 3075F PR MOST RECENT SYSTOLIC BLOOD PRESS GE 130-139MM HG: ICD-10-PCS | Mod: CPTII,S$GLB,, | Performed by: STUDENT IN AN ORGANIZED HEALTH CARE EDUCATION/TRAINING PROGRAM

## 2023-07-31 PROCEDURE — 4010F ACE/ARB THERAPY RXD/TAKEN: CPT | Mod: CPTII,S$GLB,, | Performed by: STUDENT IN AN ORGANIZED HEALTH CARE EDUCATION/TRAINING PROGRAM

## 2023-07-31 PROCEDURE — 3008F PR BODY MASS INDEX (BMI) DOCUMENTED: ICD-10-PCS | Mod: CPTII,S$GLB,, | Performed by: STUDENT IN AN ORGANIZED HEALTH CARE EDUCATION/TRAINING PROGRAM

## 2023-07-31 PROCEDURE — 3008F BODY MASS INDEX DOCD: CPT | Mod: CPTII,S$GLB,, | Performed by: STUDENT IN AN ORGANIZED HEALTH CARE EDUCATION/TRAINING PROGRAM

## 2023-07-31 PROCEDURE — 99999 PR PBB SHADOW E&M-EST. PATIENT-LVL III: ICD-10-PCS | Mod: PBBFAC,,, | Performed by: STUDENT IN AN ORGANIZED HEALTH CARE EDUCATION/TRAINING PROGRAM

## 2023-07-31 PROCEDURE — 99024 PR POST-OP FOLLOW-UP VISIT: ICD-10-PCS | Mod: S$GLB,,, | Performed by: STUDENT IN AN ORGANIZED HEALTH CARE EDUCATION/TRAINING PROGRAM

## 2023-07-31 PROCEDURE — 1159F PR MEDICATION LIST DOCUMENTED IN MEDICAL RECORD: ICD-10-PCS | Mod: CPTII,S$GLB,, | Performed by: STUDENT IN AN ORGANIZED HEALTH CARE EDUCATION/TRAINING PROGRAM

## 2023-07-31 PROCEDURE — 3079F PR MOST RECENT DIASTOLIC BLOOD PRESSURE 80-89 MM HG: ICD-10-PCS | Mod: CPTII,S$GLB,, | Performed by: STUDENT IN AN ORGANIZED HEALTH CARE EDUCATION/TRAINING PROGRAM

## 2023-07-31 PROCEDURE — 1159F MED LIST DOCD IN RCRD: CPT | Mod: CPTII,S$GLB,, | Performed by: STUDENT IN AN ORGANIZED HEALTH CARE EDUCATION/TRAINING PROGRAM

## 2023-07-31 PROCEDURE — 3079F DIAST BP 80-89 MM HG: CPT | Mod: CPTII,S$GLB,, | Performed by: STUDENT IN AN ORGANIZED HEALTH CARE EDUCATION/TRAINING PROGRAM

## 2023-07-31 PROCEDURE — 4010F PR ACE/ARB THEARPY RXD/TAKEN: ICD-10-PCS | Mod: CPTII,S$GLB,, | Performed by: STUDENT IN AN ORGANIZED HEALTH CARE EDUCATION/TRAINING PROGRAM

## 2023-07-31 PROCEDURE — 99024 POSTOP FOLLOW-UP VISIT: CPT | Mod: S$GLB,,, | Performed by: STUDENT IN AN ORGANIZED HEALTH CARE EDUCATION/TRAINING PROGRAM

## 2023-07-31 NOTE — PROGRESS NOTES
"  Subjective:       Shoaib Esqueda is a 53 y.o.  who presents to the clinic 2 weeks status post  diagnostic laparoscopy and fulguration of endometriosis and hysteroscopy D&C  for pelvic pain. Eating a regular diet without difficulty. Bowel movements are normal. The patient is not having any pain.  No bleeding    The patient's allergies, and past medical and surgical histories were reviewed.    Review of Systems  Pertinent items are noted in HPI.      Objective:      /86   Ht 5' 6" (1.676 m)   Wt 66.5 kg (146 lb 9.7 oz)   LMP 2023 Comment: upt cup given  BMI 23.66 kg/m²     APPEARANCE: Well nourished, well developed, in no acute distress.  PSYCH: Appropriate mood and affect.  NECK:  Supple, no thyromegaly.  BREASTS:  No masses, no nipple discharge.  CARDIOVASCULAR:  Regular rate and rhythm.  LUNGS:  Clear to auscultation bilaterally.  ABDOMEN:  Soft, nontender.  Incision C/D/I, healing well.    GENITOURINARY:  deferred.    EXTREMITIES: No edema.    Pathology:    Endometrium with no evidence of dysplasia, hyperplasia, malignancy     Assessment:      Doing well postoperatively.  Operative findings again reviewed. Pathology report discussed.      Plan:      1. Continue any current medications.  2. Wound care discussed.  3. Activity restrictions: none  4. Anticipated return to work: now.  5. Follow up:for annual exam in 1 year      "

## 2023-08-04 ENCOUNTER — TELEPHONE (OUTPATIENT)
Dept: OBSTETRICS AND GYNECOLOGY | Facility: CLINIC | Age: 53
End: 2023-08-04
Payer: COMMERCIAL

## 2023-08-09 ENCOUNTER — TELEPHONE (OUTPATIENT)
Dept: CARDIOLOGY | Facility: CLINIC | Age: 53
End: 2023-08-09
Payer: COMMERCIAL

## 2023-08-09 NOTE — TELEPHONE ENCOUNTER
"I was able to find the recovery monitor strips the pt referred to (in media). They were totally normal. However on top of the tracings, a list of parameters is listed concerning speed, volume etc.. The list ends up with " Filter: Monitoring  Arrhythmia: lethal".    Pt was reassured that this was a setting not an arrhythmia and verbalized understanding. She does not wish to reschedule her upcoming appointment.  "

## 2023-08-09 NOTE — TELEPHONE ENCOUNTER
"----- Message from Concepcion Mccoy RN sent at 8/9/2023  2:14 PM CDT -----  Regarding: FW: medical advice  Pt mostly worried about the comment " lethal arrhythmias" she saw on my [facility] on the report from her recovery stay on 7/18. She wants to see whether she should worry about it. Her CP is her usual issue and she states that she does not need to move her September appt for it.   She was encouraged to check her BP once in a while  I told her that I will look at the strips (have not found them yet) and get back to her. She verbalized understanding.  ----- Message -----  From: Cristel Bernal  Sent: 8/9/2023   1:29 PM CDT  To: Concepcion Mccoy RN  Subject: medical advice                                   Pt of Dr. Chopra  LOV 05/25/23    Pls call pt at [phone].  She states that she left a message about 2 weeks ago and never received a call.  She states that while she was in the hospital in July she had 2 lethal arrhythmias and she wants Dr. Chopra to look at it to be sure she isn't going to "drop dead".  She says she is still having weird chest pain as well.    Thank you      "

## 2023-08-22 DIAGNOSIS — G43.909 MIGRAINE WITHOUT STATUS MIGRAINOSUS, NOT INTRACTABLE, UNSPECIFIED MIGRAINE TYPE: ICD-10-CM

## 2023-08-22 RX ORDER — BUTALBITAL, ACETAMINOPHEN AND CAFFEINE 50; 325; 40 MG/1; MG/1; MG/1
TABLET ORAL
Qty: 10 TABLET | Refills: 0 | Status: SHIPPED | OUTPATIENT
Start: 2023-08-22

## 2023-08-23 NOTE — TELEPHONE ENCOUNTER
Refill Routing Note   Medication(s) are not appropriate for processing by [facility] for the following reason(s):      Medication outside of protocol  Responsible provider unclear    [facility] action(s):  Route Care Due:  None identified            Appointments  past 12m or future 3m with PCP    Date Provider   Last Visit   7/31/2023 Linda Lopez MD   Next Visit   Visit date not found Linda Lopez MD   ED visits in past 90 days: 0        Note composed:2:00 PM 08/23/2023

## 2023-08-24 RX ORDER — PROGESTERONE 100 MG/1
100 CAPSULE ORAL NIGHTLY
Qty: 90 CAPSULE | Refills: 2 | Status: SHIPPED | OUTPATIENT
Start: 2023-08-24 | End: 2024-02-23

## 2023-09-15 ENCOUNTER — LAB VISIT (OUTPATIENT)
Dept: LAB | Facility: OTHER | Age: 53
End: 2023-09-15
Attending: INTERNAL MEDICINE
Payer: COMMERCIAL

## 2023-09-15 DIAGNOSIS — E78.2 MIXED HYPERLIPIDEMIA: ICD-10-CM

## 2023-09-15 LAB
CHOLEST SERPL-MCNC: 156 MG/DL (ref 120–199)
CHOLEST/HDLC SERPL: 2.7 {RATIO} (ref 2–5)
HDLC SERPL-MCNC: 57 MG/DL (ref 40–75)
HDLC SERPL: 36.5 % (ref 20–50)
LDLC SERPL CALC-MCNC: 77.4 MG/DL (ref 63–159)
NONHDLC SERPL-MCNC: 99 MG/DL
TRIGL SERPL-MCNC: 108 MG/DL (ref 30–150)

## 2023-09-15 PROCEDURE — 36415 COLL VENOUS BLD VENIPUNCTURE: CPT | Performed by: INTERNAL MEDICINE

## 2023-09-15 PROCEDURE — 80061 LIPID PANEL: CPT | Performed by: INTERNAL MEDICINE

## 2023-09-27 ENCOUNTER — OFFICE VISIT (OUTPATIENT)
Dept: CARDIOLOGY | Facility: CLINIC | Age: 53
End: 2023-09-27
Payer: COMMERCIAL

## 2023-09-27 VITALS
HEIGHT: 66 IN | BODY MASS INDEX: 23.66 KG/M2 | DIASTOLIC BLOOD PRESSURE: 77 MMHG | WEIGHT: 147.25 LBS | SYSTOLIC BLOOD PRESSURE: 124 MMHG | HEART RATE: 79 BPM | OXYGEN SATURATION: 99 %

## 2023-09-27 DIAGNOSIS — I25.85 CARDIAC MICROVASCULAR DISEASE: ICD-10-CM

## 2023-09-27 DIAGNOSIS — E28.2 PCOS (POLYCYSTIC OVARIAN SYNDROME): Primary | ICD-10-CM

## 2023-09-27 DIAGNOSIS — R07.89 OTHER CHEST PAIN: ICD-10-CM

## 2023-09-27 DIAGNOSIS — F17.200 SMOKER: ICD-10-CM

## 2023-09-27 PROCEDURE — 3078F PR MOST RECENT DIASTOLIC BLOOD PRESSURE < 80 MM HG: ICD-10-PCS | Mod: CPTII,S$GLB,, | Performed by: INTERNAL MEDICINE

## 2023-09-27 PROCEDURE — 1159F PR MEDICATION LIST DOCUMENTED IN MEDICAL RECORD: ICD-10-PCS | Mod: CPTII,S$GLB,, | Performed by: INTERNAL MEDICINE

## 2023-09-27 PROCEDURE — 99214 OFFICE O/P EST MOD 30 MIN: CPT | Mod: S$GLB,,, | Performed by: INTERNAL MEDICINE

## 2023-09-27 PROCEDURE — 3008F BODY MASS INDEX DOCD: CPT | Mod: CPTII,S$GLB,, | Performed by: INTERNAL MEDICINE

## 2023-09-27 PROCEDURE — 3008F PR BODY MASS INDEX (BMI) DOCUMENTED: ICD-10-PCS | Mod: CPTII,S$GLB,, | Performed by: INTERNAL MEDICINE

## 2023-09-27 PROCEDURE — 1159F MED LIST DOCD IN RCRD: CPT | Mod: CPTII,S$GLB,, | Performed by: INTERNAL MEDICINE

## 2023-09-27 PROCEDURE — 3078F DIAST BP <80 MM HG: CPT | Mod: CPTII,S$GLB,, | Performed by: INTERNAL MEDICINE

## 2023-09-27 PROCEDURE — 3074F PR MOST RECENT SYSTOLIC BLOOD PRESSURE < 130 MM HG: ICD-10-PCS | Mod: CPTII,S$GLB,, | Performed by: INTERNAL MEDICINE

## 2023-09-27 PROCEDURE — 1160F RVW MEDS BY RX/DR IN RCRD: CPT | Mod: CPTII,S$GLB,, | Performed by: INTERNAL MEDICINE

## 2023-09-27 PROCEDURE — 99999 PR PBB SHADOW E&M-EST. PATIENT-LVL III: CPT | Mod: PBBFAC,,, | Performed by: INTERNAL MEDICINE

## 2023-09-27 PROCEDURE — 3074F SYST BP LT 130 MM HG: CPT | Mod: CPTII,S$GLB,, | Performed by: INTERNAL MEDICINE

## 2023-09-27 PROCEDURE — 99999 PR PBB SHADOW E&M-EST. PATIENT-LVL III: ICD-10-PCS | Mod: PBBFAC,,, | Performed by: INTERNAL MEDICINE

## 2023-09-27 PROCEDURE — 99214 PR OFFICE/OUTPT VISIT, EST, LEVL IV, 30-39 MIN: ICD-10-PCS | Mod: S$GLB,,, | Performed by: INTERNAL MEDICINE

## 2023-09-27 PROCEDURE — 1160F PR REVIEW ALL MEDS BY PRESCRIBER/CLIN PHARMACIST DOCUMENTED: ICD-10-PCS | Mod: CPTII,S$GLB,, | Performed by: INTERNAL MEDICINE

## 2023-09-27 PROCEDURE — 4010F PR ACE/ARB THEARPY RXD/TAKEN: ICD-10-PCS | Mod: CPTII,S$GLB,, | Performed by: INTERNAL MEDICINE

## 2023-09-27 PROCEDURE — 4010F ACE/ARB THERAPY RXD/TAKEN: CPT | Mod: CPTII,S$GLB,, | Performed by: INTERNAL MEDICINE

## 2023-09-27 RX ORDER — ISOSORBIDE MONONITRATE 30 MG/1
30 TABLET, EXTENDED RELEASE ORAL DAILY
Qty: 30 TABLET | Refills: 11 | Status: SHIPPED | OUTPATIENT
Start: 2023-09-27 | End: 2023-09-27 | Stop reason: SDUPTHER

## 2023-09-27 RX ORDER — ISOSORBIDE MONONITRATE 30 MG/1
30 TABLET, EXTENDED RELEASE ORAL DAILY
Qty: 90 TABLET | Refills: 3 | Status: SHIPPED | OUTPATIENT
Start: 2023-09-27 | End: 2023-12-01

## 2023-09-27 NOTE — PROGRESS NOTES
"Subjective:   Patient ID:  Shoaib Esqueda is a 53 y.o. female who presents for follow-up of Follow-up    Shoaib Esqueda is a 52 y.o. female who presents for follow-up of Hyperlipidemia     Chest pain in the center of the chest that is constant. Its staying the same right in the center of the chest., no association with breathing.   No SOB, no radiation.  Does not exercise regularly.  Patient works as a  and has a high stress job  1 ppd x 30 yrs  Quit 2 months ago.   Father had his valve replaced.  Going into Afib.   Irregular periods. Irregular periods since teen that has gotten better now and likely due to PCOS  Patient has breast implants since 1986  Patient has hep C.   "I have lived off diet coke and cigarettes all my life and now it can caught with me".   When she tries to walk too far her hands get swollen.      Cath    Minimal luminal irregularities in LCx on angiography.  The remainder of the coronary arteries appear normal on angiography.    The estimated blood loss was <50 mL.  Stress Echo  The left ventricle is normal in size with normal systolic function.  The estimated ejection fraction is 58%.  There are mild resting segmental left ventricular wall motion abnormalities.  There is abnormal septal wall motion.  Normal left ventricular diastolic function.  Normal right ventricular size with normal right ventricular systolic function.  Mild aortic regurgitation.  Normal central venous pressure (3 mmHg).  The estimated PA systolic pressure is 25 mmHg.  The test was stopped because the patient experienced fatigue.  The patient's exercise capacity was average.  During stress, the following significant arrhythmias were observed: occasional PVCs.  The ECG portion of this study is negative for myocardial ischemia.  The stress echo portion of this study is positive for myocardial ischemia.LVEF remains the same or slightly drops to low to mid 50s.Several walls stayed the same , eukinetic, and the " "distal anteroseptum worsened.  CT chest  There is mild fusiform dilatation of the ascending thoracic aorta which measures upper limits of normal in caliber at 3.9 cm  Calcium score   Your total calcium score is 0.  Very little coronary heart disease risk.     HPI:   Constant chest pain. Funny feeling in the center of the chest. But overall getting better  Denies palpitations or fluttering in the chest  Does not work out  Desk job'  Smoking a few cigarettes    HPI:   Chest pain has relieved significantly since she had her pelvic surgery and the endometriosis was removed,  Denies palpitations or fluttering in the chest  Desk job  Smoking a few cigarettes  Desk job. Chest pain comes on and starts late in the day and she feels heaviness in the chest no radiation no SOB     Technical Procedures Used: Diagnostic laparoscopy, fulguration of endometriosis     Description of the Findings of the Procedure: Laparoscopic findings included normal appearing liver edge and stomach.  Uterus normal in appearance with small subserosal leiomyomja approximately 1 cm.  Bilateral fallopian tubes and ovaries normal in appearance.  Small endometrial implants near L uterosacral ligaments.  L pelvic side wall with small endometrial implant.  L colon with filmy adhesion to L pelvic side wall. Hysteroscopic findings revealed normal appearing endometrial cavity and bilateral tubal ostial.       Patient Active Problem List   Diagnosis    PPD positive    Migraine    Nasal polyps    PCOS (polycystic ovarian syndrome)    Smoker    Hepatitis C    Tremor of left hand    Teeth clenching    Pulmonary nodules    Abnormal cardiovascular stress test    Mixed hyperlipidemia     /77   Pulse 79   Ht 5' 6" (1.676 m)   Wt 66.8 kg (147 lb 4.3 oz)   SpO2 99%   BMI 23.77 kg/m²   Body mass index is 23.77 kg/m².  CrCl cannot be calculated (Patient's most recent lab result is older than the maximum 7 days allowed.).    Lab Results   Component Value Date "     04/21/2023    K 4.0 04/21/2023     04/21/2023    CO2 25 04/21/2023    BUN 13 04/21/2023    CREATININE 0.6 04/21/2023    GLU 89 04/21/2023    HGBA1C 5.2 12/06/2022    AST 26 02/22/2023    ALT 26 02/22/2023    ALBUMIN 4.4 02/22/2023    PROT 8.0 02/22/2023    BILITOT 0.7 02/22/2023    WBC 10.85 07/17/2023    HGB 14.0 07/17/2023    HCT 43.5 07/17/2023     (H) 07/17/2023     07/17/2023    INR 1.1 04/21/2023    TSH 1.306 12/06/2022    CHOL 156 09/15/2023    HDL 57 09/15/2023    LDLCALC 77.4 09/15/2023    TRIG 108 09/15/2023       Current Outpatient Medications   Medication Sig    butalbital-acetaminophen-caffeine -40 mg (FIORICET, ESGIC) -40 mg per tablet TAKE 1 TABLET BY MOUTH EVERY DAY AS NEEDED FOR HEADACHE    cetirizine (ZYRTEC) 5 MG tablet Take 5 mg by mouth once daily.    galcanezumab-gnlm (EMGALITY PEN) 120 mg/mL PnIj ADMINISTER 1 ML(120 MG) UNDER THE SKIN EVERY 28 DAYS (Patient taking differently: ADMINISTER 1 ML(120 MG) UNDER THE SKIN EVERY 28 DAYS for migraines)    progesterone (PROMETRIUM) 100 MG capsule Take 1 capsule (100 mg total) by mouth every evening.    rosuvastatin (CRESTOR) 5 MG tablet Take 1 tablet (5 mg total) by mouth once daily.    isosorbide mononitrate (IMDUR) 30 MG 24 hr tablet Take 1 tablet (30 mg total) by mouth once daily.     No current facility-administered medications for this visit.       ROS    Objective:   Physical Exam    Assessment:     1. PCOS (polycystic ovarian syndrome)    2. Smoker    3. Cardiac microvascular disease    4. Other chest pain      Plan:     R/o microvascular disease, based on CIAO -ISCHEMIA trial women with positive stress Echoes had INOCA, will pursue PET to document low CFR  Shoaib was seen today for follow-up.    Diagnoses and all orders for this visit:    PCOS (polycystic ovarian syndrome)    Smoker    Cardiac microvascular disease  -     Cardiac PET Scan Stress; Future    Other chest pain    Other orders  -      Discontinue: isosorbide mononitrate (IMDUR) 30 MG 24 hr tablet; Take 1 tablet (30 mg total) by mouth once daily.  -     isosorbide mononitrate (IMDUR) 30 MG 24 hr tablet; Take 1 tablet (30 mg total) by mouth once daily.      RTC 1 yr.   Counseled on importance of heart healthy diet low in saturated and trans fat and salt as well gradually starting a regular aerobic exercise regimen with goal of 30min 5x/week. Recommend BP diary. Call if systolic BP > 130 mmHg on checking repeatedly

## 2023-10-19 ENCOUNTER — OFFICE VISIT (OUTPATIENT)
Dept: INTERNAL MEDICINE | Facility: CLINIC | Age: 53
End: 2023-10-19
Attending: INTERNAL MEDICINE
Payer: COMMERCIAL

## 2023-10-19 VITALS
BODY MASS INDEX: 23.3 KG/M2 | OXYGEN SATURATION: 99 % | WEIGHT: 145 LBS | DIASTOLIC BLOOD PRESSURE: 98 MMHG | HEIGHT: 66 IN | SYSTOLIC BLOOD PRESSURE: 136 MMHG | HEART RATE: 98 BPM

## 2023-10-19 DIAGNOSIS — G43.909 MIGRAINE WITHOUT STATUS MIGRAINOSUS, NOT INTRACTABLE, UNSPECIFIED MIGRAINE TYPE: Primary | ICD-10-CM

## 2023-10-19 DIAGNOSIS — F17.200 SMOKER: ICD-10-CM

## 2023-10-19 DIAGNOSIS — N80.9 ENDOMETRIOSIS: ICD-10-CM

## 2023-10-19 DIAGNOSIS — E78.2 MIXED HYPERLIPIDEMIA: ICD-10-CM

## 2023-10-19 DIAGNOSIS — J33.9 NASAL POLYPS: ICD-10-CM

## 2023-10-19 PROCEDURE — 1159F PR MEDICATION LIST DOCUMENTED IN MEDICAL RECORD: ICD-10-PCS | Mod: CPTII,S$GLB,, | Performed by: INTERNAL MEDICINE

## 2023-10-19 PROCEDURE — 3080F DIAST BP >= 90 MM HG: CPT | Mod: CPTII,S$GLB,, | Performed by: INTERNAL MEDICINE

## 2023-10-19 PROCEDURE — 1160F PR REVIEW ALL MEDS BY PRESCRIBER/CLIN PHARMACIST DOCUMENTED: ICD-10-PCS | Mod: CPTII,S$GLB,, | Performed by: INTERNAL MEDICINE

## 2023-10-19 PROCEDURE — 4010F PR ACE/ARB THEARPY RXD/TAKEN: ICD-10-PCS | Mod: CPTII,S$GLB,, | Performed by: INTERNAL MEDICINE

## 2023-10-19 PROCEDURE — 3075F SYST BP GE 130 - 139MM HG: CPT | Mod: CPTII,S$GLB,, | Performed by: INTERNAL MEDICINE

## 2023-10-19 PROCEDURE — 99214 OFFICE O/P EST MOD 30 MIN: CPT | Mod: S$GLB,,, | Performed by: INTERNAL MEDICINE

## 2023-10-19 PROCEDURE — 3075F PR MOST RECENT SYSTOLIC BLOOD PRESS GE 130-139MM HG: ICD-10-PCS | Mod: CPTII,S$GLB,, | Performed by: INTERNAL MEDICINE

## 2023-10-19 PROCEDURE — 1159F MED LIST DOCD IN RCRD: CPT | Mod: CPTII,S$GLB,, | Performed by: INTERNAL MEDICINE

## 2023-10-19 PROCEDURE — 3008F PR BODY MASS INDEX (BMI) DOCUMENTED: ICD-10-PCS | Mod: CPTII,S$GLB,, | Performed by: INTERNAL MEDICINE

## 2023-10-19 PROCEDURE — 4010F ACE/ARB THERAPY RXD/TAKEN: CPT | Mod: CPTII,S$GLB,, | Performed by: INTERNAL MEDICINE

## 2023-10-19 PROCEDURE — 3080F PR MOST RECENT DIASTOLIC BLOOD PRESSURE >= 90 MM HG: ICD-10-PCS | Mod: CPTII,S$GLB,, | Performed by: INTERNAL MEDICINE

## 2023-10-19 PROCEDURE — 3008F BODY MASS INDEX DOCD: CPT | Mod: CPTII,S$GLB,, | Performed by: INTERNAL MEDICINE

## 2023-10-19 PROCEDURE — 99214 PR OFFICE/OUTPT VISIT, EST, LEVL IV, 30-39 MIN: ICD-10-PCS | Mod: S$GLB,,, | Performed by: INTERNAL MEDICINE

## 2023-10-19 PROCEDURE — 1160F RVW MEDS BY RX/DR IN RCRD: CPT | Mod: CPTII,S$GLB,, | Performed by: INTERNAL MEDICINE

## 2023-10-19 RX ORDER — DOXYCYCLINE 100 MG/1
100 CAPSULE ORAL 2 TIMES DAILY
Qty: 14 CAPSULE | Refills: 0 | Status: SHIPPED | OUTPATIENT
Start: 2023-10-19 | End: 2023-10-26

## 2023-10-19 NOTE — PROGRESS NOTES
Subjective     Patient ID: Shoaib Esqueda is a 53 y.o. female.    Chief Complaint: Annual Exam (Believes she may have a sinus infection that was brought on from the teeth cleaning, woke up 2 mornings in a row with bed soaking wet, Pain going down spine with headaches started last week of Sept after teeth cleaning would like an antibiotic, has taken a full bottle Fiorcet, Emgality and TRUDHESA)    She had her teeth cleaned recently.  She is had a severe headache behind her right eye.  Her sinuses are congested.  She feels like she has a sinus infection.  She has had night sweats for several days.  Recent COVID test was negative.      Review of Systems   Constitutional: Negative.    HENT:  Positive for nasal congestion and sinus pressure/congestion.    Respiratory:  Negative for shortness of breath.    Cardiovascular:  Negative for chest pain.          Objective     Physical Exam  Vitals and nursing note reviewed.   Constitutional:       Appearance: She is well-developed.   HENT:      Head: Normocephalic.      Right Ear: Tympanic membrane and ear canal normal.      Left Ear: Tympanic membrane and ear canal normal.      Mouth/Throat:      Mouth: Mucous membranes are moist.   Eyes:      Pupils: Pupils are equal, round, and reactive to light.   Cardiovascular:      Rate and Rhythm: Normal rate and regular rhythm.      Heart sounds: Normal heart sounds.   Pulmonary:      Effort: Pulmonary effort is normal.   Neurological:      Mental Status: She is alert.            Assessment and Plan     1. Migraine without status migrainosus, not intractable, unspecified migraine type    2. Smoker    3. Mixed hyperlipidemia    4. Nasal polyps  Overview:  S/p sinus surgery x 4.  Denies ASA allergy.      5. Endometriosis  Overview:  Lap fulguration - 7/23      Other orders  -     doxycycline (VIBRAMYCIN) 100 MG Cap; Take 1 capsule (100 mg total) by mouth 2 (two) times daily. for 7 days  Dispense: 14 capsule; Refill: 0        Per orders  and D/C instructions.  Continue diet and/or meds for migraines and high cholesterol, which are stable.  Follow-up with gyn for endometriosis.  Doxycycline for possible sinusitis.  Between 30 and 39 min of total time for evaluation and management services were spent on the patient today.  The medical problems and treatment options were discussed, and all questions were answered.             Follow up in about 6 months (around 4/19/2024).

## 2023-10-25 ENCOUNTER — TELEPHONE (OUTPATIENT)
Dept: PULMONOLOGY | Facility: CLINIC | Age: 53
End: 2023-10-25
Payer: COMMERCIAL

## 2023-10-25 NOTE — TELEPHONE ENCOUNTER
I spoke with Ms Esqueda in regards to her scheduled CT Scan. I informed patient that her CT is scheduled for 11/20/23 at 8 AM. Patient stated her back been hot and she think it's due to her TB. I let patient know that I'll send Dr Oswald a message to review and advise. Patient verbalized understanding.

## 2023-10-25 NOTE — TELEPHONE ENCOUNTER
----- Message from Sammie Xie sent at 10/25/2023  1:37 PM CDT -----  Regarding: appt  Contact: [phone]  Pt is calling to speak with someone in provider office is asking for a return call  to spk about   phn appt schedule and also would like to speak about not feeling well and would like to know should she go to infectious disease because of TB flare up please call pt [phone]

## 2023-11-06 ENCOUNTER — PATIENT MESSAGE (OUTPATIENT)
Dept: ADMINISTRATIVE | Facility: HOSPITAL | Age: 53
End: 2023-11-06
Payer: COMMERCIAL

## 2023-11-15 ENCOUNTER — PATIENT MESSAGE (OUTPATIENT)
Dept: OBSTETRICS AND GYNECOLOGY | Facility: CLINIC | Age: 53
End: 2023-11-15
Payer: COMMERCIAL

## 2023-11-20 ENCOUNTER — PATIENT MESSAGE (OUTPATIENT)
Dept: PULMONOLOGY | Facility: CLINIC | Age: 53
End: 2023-11-20
Payer: COMMERCIAL

## 2023-11-20 ENCOUNTER — HOSPITAL ENCOUNTER (OUTPATIENT)
Dept: RADIOLOGY | Facility: OTHER | Age: 53
Discharge: HOME | End: 2023-11-20
Attending: INTERNAL MEDICINE
Payer: COMMERCIAL

## 2023-11-20 DIAGNOSIS — R91.8 PULMONARY NODULES: Primary | ICD-10-CM

## 2023-11-20 DIAGNOSIS — R91.8 PULMONARY NODULES: ICD-10-CM

## 2023-11-20 PROCEDURE — 71250 CT THORAX DX C-: CPT | Mod: TC

## 2023-11-20 PROCEDURE — 71250 CT THORAX DX C-: CPT | Mod: 26,,, | Performed by: RADIOLOGY

## 2023-11-20 PROCEDURE — 71250 CT CHEST WITHOUT CONTRAST: ICD-10-PCS | Mod: 26,,, | Performed by: RADIOLOGY

## 2023-11-24 ENCOUNTER — OFFICE VISIT (OUTPATIENT)
Dept: OBSTETRICS AND GYNECOLOGY | Facility: CLINIC | Age: 53
End: 2023-11-24
Payer: COMMERCIAL

## 2023-11-24 DIAGNOSIS — N95.1 MENOPAUSAL HOT FLUSHES: Primary | ICD-10-CM

## 2023-11-24 PROCEDURE — 99213 PR OFFICE/OUTPT VISIT, EST, LEVL III, 20-29 MIN: ICD-10-PCS | Mod: 95,,, | Performed by: STUDENT IN AN ORGANIZED HEALTH CARE EDUCATION/TRAINING PROGRAM

## 2023-11-24 PROCEDURE — 4010F ACE/ARB THERAPY RXD/TAKEN: CPT | Mod: CPTII,95,, | Performed by: STUDENT IN AN ORGANIZED HEALTH CARE EDUCATION/TRAINING PROGRAM

## 2023-11-24 PROCEDURE — 4010F PR ACE/ARB THEARPY RXD/TAKEN: ICD-10-PCS | Mod: CPTII,95,, | Performed by: STUDENT IN AN ORGANIZED HEALTH CARE EDUCATION/TRAINING PROGRAM

## 2023-11-24 PROCEDURE — 99213 OFFICE O/P EST LOW 20 MIN: CPT | Mod: 95,,, | Performed by: STUDENT IN AN ORGANIZED HEALTH CARE EDUCATION/TRAINING PROGRAM

## 2023-11-24 RX ORDER — FEZOLINETANT 45 MG/1
45 TABLET, FILM COATED ORAL DAILY
Qty: 30 TABLET | Refills: 11 | Status: SHIPPED | OUTPATIENT
Start: 2023-11-24

## 2023-11-24 NOTE — PROGRESS NOTES
The patient location is: Home  The chief complaint leading to consultation is: Hot flushes    Visit type: audiovisual    Face to Face time with patient: 7 mins  20 minutes of total time spent on the encounter, which includes face to face time and non-face to face time preparing to see the patient (eg, review of tests), Obtaining and/or reviewing separately obtained history, Documenting clinical information in the electronic or other health record, Independently interpreting results (not separately reported) and communicating results to the patient/family/caregiver, or Care coordination (not separately reported).         Each patient to whom he or she provides medical services by telemedicine is:  (1) informed of the relationship between the physician and patient and the respective role of any other health care provider with respect to management of the patient; and (2) notified that he or she may decline to receive medical services by telemedicine and may withdraw from such care at any time.    Notes:     Chief Complaint: Hot flushes     HPI:      Shoaib Esqueda is a 53 y.o.  who presents today for follow up.  She reports hot flushes that are waking her up at night.  She is unsure if it is related to lung abnormalities or something else.  She has follow up with PCP.  LMP: No LMP recorded..  Wants to avoid HRT with estrogen due to history of endometriosis.  Is interested in veozah.  No other issues, problems, or complaints.     OB History          3    Para        Term                AB   3    Living             SAB        IAB   3    Ectopic        Multiple        Live Births   0               Past Medical History:   Diagnosis Date    Hepatitis C      Rx w/ chemo x 3 months.  Current  viral load is zero  Recent IUD (copper)     Lung nodules     x 2    Migraine 2014    Nasal polyps 2014    PCOS (polycystic ovarian syndrome) 2014    PONV (postoperative nausea and vomiting)      PPD positive 10/27/2012    Uses contact lenses      Past Surgical History:   Procedure Laterality Date    angimeioma finger      AUGMENTATION OF BREAST Bilateral 1987    CORONARY ANGIOGRAPHY N/A 04/21/2023    Procedure: ANGIOGRAM, CORONARY ARTERY;  Surgeon: Esteban Restrepo MD;  Location: [facility] CATH LAB;  Service: Cardiology;  Laterality: N/A;  low bleeding risk 2.0%    DIAGNOSTIC LAPAROSCOPY N/A 7/18/2023    Procedure: LAPAROSCOPY, DIAGNOSTIC;  Surgeon: Linda Lopez MD;  Location: [facility];  Service: OB/GYN;  Laterality: N/A;  60 MINUTES    FACIAL RECONSTRUCTION SURGERY      flat feet repair Bilateral     HYSTEROSCOPY N/A 7/18/2023    Procedure: HYSTEROSCOPY;  Surgeon: Linda Lopez MD;  Location: [facility];  Service: OB/GYN;  Laterality: N/A;    SINUS SURGERY      x4     Social History     Socioeconomic History    Marital status: Single    Number of children: 0   Occupational History     Employer: [employer]    Occupation:  for [employer]     Employer: [employer]   Tobacco Use    Smoking status: Every Day     Current packs/day: 0.50     Average packs/day: 0.5 packs/day for 15.0 years (7.5 ttl pk-yrs)     Types: Cigarettes     Passive exposure: Past    Smokeless tobacco: Never    Tobacco comments:     Pt stop smoking 2/22/2023 after receiving CT Results   Substance and Sexual Activity    Alcohol use: Yes     Alcohol/week: 16.0 standard drinks of alcohol     Types: 2 Shots of liquor, 14 Standard drinks or equivalent per week     Comment: 2 shots per night    Drug use: No    Sexual activity: Yes     Partners: Male   Social History Narrative    Runs and lifts weights.    Father is a radiologist     Family History   Problem Relation Age of Onset    Hypertension Mother     Heart disease Father     Hypertension Father     Cancer Maternal Aunt     Cancer Maternal Uncle     Breast cancer Neg Hx     Ovarian cancer Neg Hx        Current Outpatient Medications:      butalbital-acetaminophen-caffeine -40 mg (FIORICET, ESGIC) -40 mg per tablet, TAKE 1 TABLET BY MOUTH EVERY DAY AS NEEDED FOR HEADACHE, Disp: 10 tablet, Rfl: 0    cetirizine (ZYRTEC) 5 MG tablet, Take 5 mg by mouth once daily., Disp: , Rfl:     fezolinetant (VEOZAH) 45 mg Tab, Take 1 tablet (45 mg) by mouth once daily., Disp: 30 tablet, Rfl: 11    galcanezumab-gnlm (EMGALITY PEN) 120 mg/mL PnIj, ADMINISTER 1 ML(120 MG) UNDER THE SKIN EVERY 28 DAYS (Patient taking differently: ADMINISTER 1 ML(120 MG) UNDER THE SKIN EVERY 28 DAYS for migraines), Disp: 3 mL, Rfl: 3    isosorbide mononitrate (IMDUR) 30 MG 24 hr tablet, Take 1 tablet (30 mg total) by mouth once daily., Disp: 90 tablet, Rfl: 3    progesterone (PROMETRIUM) 100 MG capsule, Take 1 capsule (100 mg total) by mouth every evening., Disp: 90 capsule, Rfl: 2    rosuvastatin (CRESTOR) 5 MG tablet, Take 1 tablet (5 mg total) by mouth once daily., Disp: 90 tablet, Rfl: 3    ROS:     GENERAL: Denies unintentional weight gain or weight loss. Feeling well overall.   SKIN: Denies rash or lesions.   HEENT: Denies headaches, or vision changes.   ABDOMEN: Denies abdominal pain, constipation, diarrhea, nausea, vomiting, change in appetite.  URINARY: Denies frequency, dysuria, hematuria.  NEUROLOGIC: Denies syncope or weakness.   PSYCHIATRIC: Denies depression, anxiety or mood swings.    Physical Exam:      PHYSICAL EXAM:  There were no vitals taken for this visit.  There is no height or weight on file to calculate BMI.     APPEARANCE: Well nourished, well developed, in no acute distress.  PSYCH: Appropriate mood and affect.  SKIN: No acne or hirsutism.      Assessment/Plan:     53 y.o.     Menopausal hot flushes  -     ESTRADIOL; Future; Expected date: 2023  -     FOLLICLE STIMULATING HORMONE; Future; Expected date: 2023  -     TSH; Future; Expected date: 2023  -     COMPREHENSIVE METABOLIC PANEL; Future; Expected date: 2023    Other  orders  -     fezolinetant (VEOZAH) 45 mg Tab; Take 1 tablet (45 mg) by mouth once daily.  Dispense: 30 tablet; Refill: 11          Counselin.  Hot flushes:  Labs ordered.  She will call when she is ready to schedule.  She will also follow up with PCP.  A full discussion of the risks and benefits of hormone replacement therapy was carried out. Specifically, improvement in vasomotor and other climacteric symptoms, and a reduced risk for osteoporosis was explained.  We discussed the study data showing increased risk of thrombo-embolic events such as myocardial infarction, stroke and also possibly breast cancer with estrogen replacement. The patient was informed that these are not the only risks of HRT, and she was encouraged to read the medication packaging insert for a full list of side effects. We also discussed ACOG's recommendation to use hormone replacement therapy for the relief of menopausal symptoms on the lowest dose possible for the shortest amount of time. Alternatives to hormonal therapies, such as SSRIs/SNRIs were discussed.  She declines treatment with SSRI/SNRI.  She would also like to avoid estrogen due to endometriosis.  Will get labs prior to starting and needs cmp q 3 months.  Sent to pharmacy.  R/b/a reviewed and all questions answered.  She will call with any issues or concerns.  Rtc in 3 months.

## 2023-12-01 ENCOUNTER — OFFICE VISIT (OUTPATIENT)
Dept: INTERNAL MEDICINE | Facility: CLINIC | Age: 53
End: 2023-12-01
Attending: INTERNAL MEDICINE
Payer: COMMERCIAL

## 2023-12-01 VITALS
HEART RATE: 95 BPM | SYSTOLIC BLOOD PRESSURE: 128 MMHG | OXYGEN SATURATION: 98 % | HEIGHT: 66 IN | WEIGHT: 147 LBS | BODY MASS INDEX: 23.63 KG/M2 | DIASTOLIC BLOOD PRESSURE: 92 MMHG

## 2023-12-01 DIAGNOSIS — R25.1 TREMOR OF LEFT HAND: ICD-10-CM

## 2023-12-01 DIAGNOSIS — R91.8 PULMONARY NODULES: ICD-10-CM

## 2023-12-01 DIAGNOSIS — E28.2 PCOS (POLYCYSTIC OVARIAN SYNDROME): Primary | ICD-10-CM

## 2023-12-01 DIAGNOSIS — R25.2 MUSCLE CRAMPS: ICD-10-CM

## 2023-12-01 DIAGNOSIS — F17.200 SMOKER: ICD-10-CM

## 2023-12-01 DIAGNOSIS — G43.909 MIGRAINE WITHOUT STATUS MIGRAINOSUS, NOT INTRACTABLE, UNSPECIFIED MIGRAINE TYPE: ICD-10-CM

## 2023-12-01 DIAGNOSIS — E78.2 MIXED HYPERLIPIDEMIA: ICD-10-CM

## 2023-12-01 DIAGNOSIS — N80.9 ENDOMETRIOSIS: ICD-10-CM

## 2023-12-01 DIAGNOSIS — I10 PRIMARY HYPERTENSION: ICD-10-CM

## 2023-12-01 PROCEDURE — 99215 OFFICE O/P EST HI 40 MIN: CPT | Mod: S$GLB,,, | Performed by: INTERNAL MEDICINE

## 2023-12-01 PROCEDURE — 99215 PR OFFICE/OUTPT VISIT, EST, LEVL V, 40-54 MIN: ICD-10-PCS | Mod: S$GLB,,, | Performed by: INTERNAL MEDICINE

## 2023-12-01 PROCEDURE — 1159F PR MEDICATION LIST DOCUMENTED IN MEDICAL RECORD: ICD-10-PCS | Mod: CPTII,S$GLB,, | Performed by: INTERNAL MEDICINE

## 2023-12-01 PROCEDURE — 3074F SYST BP LT 130 MM HG: CPT | Mod: CPTII,S$GLB,, | Performed by: INTERNAL MEDICINE

## 2023-12-01 PROCEDURE — 3080F PR MOST RECENT DIASTOLIC BLOOD PRESSURE >= 90 MM HG: ICD-10-PCS | Mod: CPTII,S$GLB,, | Performed by: INTERNAL MEDICINE

## 2023-12-01 PROCEDURE — 4010F PR ACE/ARB THEARPY RXD/TAKEN: ICD-10-PCS | Mod: CPTII,S$GLB,, | Performed by: INTERNAL MEDICINE

## 2023-12-01 PROCEDURE — 1160F PR REVIEW ALL MEDS BY PRESCRIBER/CLIN PHARMACIST DOCUMENTED: ICD-10-PCS | Mod: CPTII,S$GLB,, | Performed by: INTERNAL MEDICINE

## 2023-12-01 PROCEDURE — 3074F PR MOST RECENT SYSTOLIC BLOOD PRESSURE < 130 MM HG: ICD-10-PCS | Mod: CPTII,S$GLB,, | Performed by: INTERNAL MEDICINE

## 2023-12-01 PROCEDURE — 1159F MED LIST DOCD IN RCRD: CPT | Mod: CPTII,S$GLB,, | Performed by: INTERNAL MEDICINE

## 2023-12-01 PROCEDURE — 1160F RVW MEDS BY RX/DR IN RCRD: CPT | Mod: CPTII,S$GLB,, | Performed by: INTERNAL MEDICINE

## 2023-12-01 PROCEDURE — 3008F PR BODY MASS INDEX (BMI) DOCUMENTED: ICD-10-PCS | Mod: CPTII,S$GLB,, | Performed by: INTERNAL MEDICINE

## 2023-12-01 PROCEDURE — 3008F BODY MASS INDEX DOCD: CPT | Mod: CPTII,S$GLB,, | Performed by: INTERNAL MEDICINE

## 2023-12-01 PROCEDURE — 3080F DIAST BP >= 90 MM HG: CPT | Mod: CPTII,S$GLB,, | Performed by: INTERNAL MEDICINE

## 2023-12-01 PROCEDURE — 4010F ACE/ARB THERAPY RXD/TAKEN: CPT | Mod: CPTII,S$GLB,, | Performed by: INTERNAL MEDICINE

## 2023-12-01 RX ORDER — PROPRANOLOL HYDROCHLORIDE 60 MG/1
60 CAPSULE, EXTENDED RELEASE ORAL NIGHTLY
Qty: 90 CAPSULE | Refills: 3 | Status: SHIPPED | OUTPATIENT
Start: 2023-12-01 | End: 2024-02-19 | Stop reason: SDUPTHER

## 2023-12-01 RX ORDER — ROSUVASTATIN CALCIUM 5 MG/1
2.5 TABLET, COATED ORAL DAILY
Qty: 45 TABLET | Refills: 3
Start: 2023-12-01 | End: 2024-11-30

## 2023-12-01 NOTE — PROGRESS NOTES
Subjective     Patient ID: Shoaib Esqueda is a 53 y.o. female.    Chief Complaint: Follow-up (Dr Chopra discontinued Lorsartan 25mg and started Isosorbide at last visit since then has been having horrible headaches also causing her BP to plummet and caused her to pass out 2 times, patient discontinued Rosuvastatin due to cramps would like to know what she should do, the recent lung scan showed the 2 previous nodules have dissolved and a new one showed up in which the radiologist stated that it could be infectious what does that mean and what should be done ), Headache, and Dizziness    She tried Imdur for chest pains in hypertension, but it gave her a bad headache and made her blood pressure so low she passed out.  She quit taking it and is currently taking losartan 12.5 mg daily.  She quit taking rosuvastatin because it gave her muscle aches.    Follow-up  Associated symptoms include headaches. Pertinent negatives include no chest pain.   Headache   Associated symptoms include dizziness.   Dizziness:    Associated symptoms: headaches.no chest pain.  Hypertension  This is a chronic problem. The current episode started more than 1 month ago. The problem has been waxing and waning since onset. Associated symptoms include headaches. Pertinent negatives include no chest pain or shortness of breath.     Review of Systems   Constitutional: Negative.    Respiratory:  Negative for shortness of breath.    Cardiovascular:  Negative for chest pain.   Neurological:  Positive for dizziness, tremors, syncope and headaches.          Objective     Physical Exam  Vitals and nursing note reviewed.   Constitutional:       Appearance: She is well-developed.   HENT:      Head: Normocephalic.   Eyes:      Pupils: Pupils are equal, round, and reactive to light.   Cardiovascular:      Rate and Rhythm: Normal rate and regular rhythm.      Heart sounds: Normal heart sounds.   Pulmonary:      Effort: Pulmonary effort is normal.    Neurological:      Mental Status: She is alert.            Assessment and Plan     1. PCOS (polycystic ovarian syndrome)    2. Mixed hyperlipidemia    3. Migraine without status migrainosus, not intractable, unspecified migraine type    4. Smoker    5. Tremor of left hand    6. Pulmonary nodules  Overview:  Multiple solid nodules in the right upper lobe, the largest of which is 9mm, present on CT Chest form February 2023.      7. Endometriosis  Overview:  Lap fulguration - 7/23      8. Primary hypertension  Overview:  2023      9. Muscle cramps    Other orders  -     rosuvastatin (CRESTOR) 5 MG tablet; Take 0.5 tablets (2.5 mg total) by mouth once daily.  Dispense: 45 tablet; Refill: 3  -     propranoloL (INDERAL LA) 60 MG 24 hr capsule; Take 1 capsule (60 mg total) by mouth nightly.  Dispense: 90 capsule; Refill: 3        Plan:  Per orders and D/C instructions.  She will try taking propranolol LA 60 mg daily and continue a low-sodium diet for hypertension, tremors, and migraine prevention.  She will restart rosuvastatin 1/2 tablet 3 times a week and try to slowly increase to 1/2 tablet daily for high cholesterol and to minimize muscle cramps..  She will continue a diet low in saturated fat.  Follow-up with pulmonary for pulmonary nodules.  Follow-up with gyn for endometriosis and PCOS.    She was encouraged to quit smoking.    Between 40-54 minutes of total time for evaluation and management services were spent on the patient today.  The medical problems and treatment options were discussed, and all questions were answered.        Follow up in about 6 months (around 6/1/2024).

## 2023-12-01 NOTE — PATIENT INSTRUCTIONS
Take rosuvastatin 5 mg tablet - 1/2 tablet 3 times a week and try to increase to 1/2 tablet daily if possible.

## 2023-12-05 ENCOUNTER — PATIENT MESSAGE (OUTPATIENT)
Dept: INTERNAL MEDICINE | Facility: CLINIC | Age: 53
End: 2023-12-05
Payer: COMMERCIAL

## 2024-02-21 RX ORDER — PROPRANOLOL HYDROCHLORIDE 60 MG/1
60 CAPSULE, EXTENDED RELEASE ORAL NIGHTLY
Qty: 90 CAPSULE | Refills: 3 | Status: SHIPPED | OUTPATIENT
Start: 2024-02-21 | End: 2025-02-20

## 2024-02-23 RX ORDER — PROGESTERONE 100 MG/1
100 CAPSULE ORAL
Qty: 90 CAPSULE | Refills: 3 | Status: SHIPPED | OUTPATIENT
Start: 2024-02-23

## 2024-02-23 NOTE — TELEPHONE ENCOUNTER
Refill Decision Note   Shoaib Esqueda  is requesting a refill authorization.  Brief Assessment and Rationale for Refill:  Approve     Medication Therapy Plan:       Medication Reconciliation Completed: No   Comments:     No Care Gaps recommended.     Note composed:9:28 AM 02/23/2024

## 2024-03-04 ENCOUNTER — PATIENT MESSAGE (OUTPATIENT)
Dept: ADMINISTRATIVE | Facility: HOSPITAL | Age: 54
End: 2024-03-04
Payer: COMMERCIAL

## 2024-04-16 ENCOUNTER — OFFICE VISIT (OUTPATIENT)
Dept: INTERNAL MEDICINE | Facility: CLINIC | Age: 54
End: 2024-04-16
Attending: INTERNAL MEDICINE
Payer: COMMERCIAL

## 2024-04-16 ENCOUNTER — HOSPITAL ENCOUNTER (OUTPATIENT)
Dept: RADIOLOGY | Facility: OTHER | Age: 54
Discharge: HOME | End: 2024-04-16
Attending: INTERNAL MEDICINE
Payer: COMMERCIAL

## 2024-04-16 ENCOUNTER — PATIENT MESSAGE (OUTPATIENT)
Dept: INTERNAL MEDICINE | Facility: CLINIC | Age: 54
End: 2024-04-16

## 2024-04-16 VITALS
HEART RATE: 72 BPM | OXYGEN SATURATION: 97 % | WEIGHT: 148 LBS | SYSTOLIC BLOOD PRESSURE: 132 MMHG | BODY MASS INDEX: 23.89 KG/M2 | DIASTOLIC BLOOD PRESSURE: 88 MMHG

## 2024-04-16 DIAGNOSIS — M25.571 ACUTE RIGHT ANKLE PAIN: ICD-10-CM

## 2024-04-16 DIAGNOSIS — E03.9 HYPOTHYROIDISM, UNSPECIFIED TYPE: ICD-10-CM

## 2024-04-16 DIAGNOSIS — M79.671 RIGHT FOOT PAIN: ICD-10-CM

## 2024-04-16 DIAGNOSIS — D51.0 PERNICIOUS ANEMIA: ICD-10-CM

## 2024-04-16 DIAGNOSIS — Z00.00 ROUTINE ADULT HEALTH MAINTENANCE: Primary | ICD-10-CM

## 2024-04-16 DIAGNOSIS — M54.41 CHRONIC RIGHT-SIDED LOW BACK PAIN WITH RIGHT-SIDED SCIATICA: ICD-10-CM

## 2024-04-16 DIAGNOSIS — E28.2 PCOS (POLYCYSTIC OVARIAN SYNDROME): ICD-10-CM

## 2024-04-16 DIAGNOSIS — R79.89 OTHER SPECIFIED ABNORMAL FINDINGS OF BLOOD CHEMISTRY: ICD-10-CM

## 2024-04-16 DIAGNOSIS — D50.8 OTHER IRON DEFICIENCY ANEMIA: ICD-10-CM

## 2024-04-16 DIAGNOSIS — E78.9 DISORDER OF LIPID METABOLISM: ICD-10-CM

## 2024-04-16 DIAGNOSIS — Z00.01 ENCOUNTER FOR GENERAL ADULT MEDICAL EXAMINATION WITH ABNORMAL FINDINGS: ICD-10-CM

## 2024-04-16 DIAGNOSIS — G89.29 CHRONIC RIGHT-SIDED LOW BACK PAIN WITH RIGHT-SIDED SCIATICA: ICD-10-CM

## 2024-04-16 DIAGNOSIS — Z98.890 HISTORY OF LEFT HEART CATHETERIZATION: ICD-10-CM

## 2024-04-16 DIAGNOSIS — G43.909 MIGRAINE WITHOUT STATUS MIGRAINOSUS, NOT INTRACTABLE, UNSPECIFIED MIGRAINE TYPE: Primary | ICD-10-CM

## 2024-04-16 DIAGNOSIS — I10 PRIMARY HYPERTENSION: ICD-10-CM

## 2024-04-16 DIAGNOSIS — E55.9 VITAMIN D DEFICIENCY: ICD-10-CM

## 2024-04-16 DIAGNOSIS — E78.2 MIXED HYPERLIPIDEMIA: ICD-10-CM

## 2024-04-16 PROCEDURE — 73610 X-RAY EXAM OF ANKLE: CPT | Mod: 26,RT,, | Performed by: RADIOLOGY

## 2024-04-16 PROCEDURE — 99214 OFFICE O/P EST MOD 30 MIN: CPT | Mod: 25,S$GLB,, | Performed by: INTERNAL MEDICINE

## 2024-04-16 PROCEDURE — 3008F BODY MASS INDEX DOCD: CPT | Mod: CPTII,S$GLB,, | Performed by: INTERNAL MEDICINE

## 2024-04-16 PROCEDURE — 3075F SYST BP GE 130 - 139MM HG: CPT | Mod: CPTII,S$GLB,, | Performed by: INTERNAL MEDICINE

## 2024-04-16 PROCEDURE — 99396 PREV VISIT EST AGE 40-64: CPT | Mod: S$GLB,,, | Performed by: INTERNAL MEDICINE

## 2024-04-16 PROCEDURE — 73610 X-RAY EXAM OF ANKLE: CPT | Mod: TC,FY,RT

## 2024-04-16 PROCEDURE — 1160F RVW MEDS BY RX/DR IN RCRD: CPT | Mod: CPTII,S$GLB,, | Performed by: INTERNAL MEDICINE

## 2024-04-16 PROCEDURE — 73630 X-RAY EXAM OF FOOT: CPT | Mod: TC,FY,RT

## 2024-04-16 PROCEDURE — 3079F DIAST BP 80-89 MM HG: CPT | Mod: CPTII,S$GLB,, | Performed by: INTERNAL MEDICINE

## 2024-04-16 PROCEDURE — 1159F MED LIST DOCD IN RCRD: CPT | Mod: CPTII,S$GLB,, | Performed by: INTERNAL MEDICINE

## 2024-04-16 RX ORDER — BUTALBITAL, ACETAMINOPHEN AND CAFFEINE 50; 325; 40 MG/1; MG/1; MG/1
1 TABLET ORAL DAILY
Qty: 10 TABLET | Refills: 0 | Status: SHIPPED | OUTPATIENT
Start: 2024-04-16

## 2024-04-16 NOTE — PROGRESS NOTES
Subjective     Patient ID: Shoaib Esqueda is a 53 y.o. female.    Chief Complaint: Annual Exam (Not sure if she has a pinched nerve in back however she is having significant back pain, Saturday got up off the couch when her food gave way and she fell hurting her foot she can bare weight on it however it is very swollen and purple )    She has had pain in her lower back which radiates down the back of her right leg for several weeks.  Three days ago she stood up and her right ankle gave way.  The foot rolled medially.  She now has significant pain and swelling and difficulty bearing weight.    Hypertension  This is a chronic problem. The current episode started more than 1 year ago. The problem has been gradually improving since onset. Pertinent negatives include no chest pain or shortness of breath.     Review of Systems   Constitutional: Negative.    Respiratory:  Negative for shortness of breath.    Cardiovascular:  Negative for chest pain.   Musculoskeletal:  Positive for arthralgias, back pain, gait problem and joint swelling.          Objective     Physical Exam  Vitals and nursing note reviewed.   Constitutional:       Appearance: She is well-developed.   HENT:      Head: Normocephalic.   Eyes:      Pupils: Pupils are equal, round, and reactive to light.   Cardiovascular:      Rate and Rhythm: Normal rate and regular rhythm.      Heart sounds: Normal heart sounds.   Pulmonary:      Effort: Pulmonary effort is normal.   Musculoskeletal:      Right ankle: Swelling and ecchymosis present. Tenderness present. Decreased range of motion.      Right foot: Decreased range of motion. Swelling, tenderness and bony tenderness present.        Legs:       Comments: She has significant edema, tenderness, and ecchymosis around the right lateral ankle and foot.   Neurological:      Mental Status: She is alert.            Assessment and Plan     1. Migraine without status migrainosus, not intractable, unspecified migraine  type  -     butalbital-acetaminophen-caffeine -40 mg (FIORICET, ESGIC) -40 mg per tablet; Take 1 tablet by mouth once daily.  Dispense: 10 tablet; Refill: 0    2. Mixed hyperlipidemia    3. Primary hypertension  Overview:  2023      4. Acute right ankle pain  -     X-Ray Ankle Complete 3 View Right; Future; Expected date: 04/16/2024  -     Ambulatory referral/consult to Orthopedics; Future; Expected date: 04/23/2024    5. Right foot pain  -     X-Ray Foot Complete 3 view Right; Future; Expected date: 04/16/2024    6. Chronic right-sided low back pain with right-sided sciatica  -     Ambulatory referral/consult to Orthopedics; Future; Expected date: 04/23/2024    7. PCOS (polycystic ovarian syndrome)    8. History of left heart catheterization  Overview:  4/23 - normal coronary arteries      9. Encounter for general adult medical examination with abnormal findings        PLAN:  Per orders and D/C instructions.  Continue diet and/or meds for migraines, PCOS, HTN, and high cholesterol, which are stable.  She refuses to take a statin for high cholesterol.  Check routine labs today.  X-ray right ankle and foot and refer to orthopedic foot surgeon.  Refer to orthopedic spine surgeon for low back pain with right-sided radiculopathy.       Follow up in about 6 months (around 10/16/2024).

## 2024-04-16 NOTE — PROGRESS NOTES
Subjective     Patient ID: Shoaib Esqueda is a 53 y.o. female.    Chief Complaint: Annual Exam (Not sure if she has a pinched nerve in back however she is having significant back pain, Saturday got up off the couch when her food gave way and she fell hurting her foot she can bare weight on it however it is very swollen and purple )      Adult Wellness Exam:    Mental Conditions: None  Depression Risk Factors: None  BMI: See Vital signs   Colon screen:    See Health Maintenance Report      Females: Mammogram and PAP: per Gyn                                 Vaccines (Flu, Adacel, Shingrix): See Health Maintenance Report  Routine labs (Cholesterol, Glucose/Hgb A1C, and TSH): ordered.     The patient's current health status is: Fair/Good   Patient was educated on routine health maintenance. See Patient Instructions.                               Review of Systems   Constitutional: Negative.    Respiratory:  Negative for shortness of breath.    Cardiovascular:  Negative for chest pain.   Musculoskeletal:  Positive for arthralgias, gait problem and joint swelling.          Objective     Physical Exam  Vitals and nursing note reviewed.   HENT:      Head: Normocephalic and atraumatic.   Eyes:      Pupils: Pupils are equal, round, and reactive to light.   Neurological:      Mental Status: She is alert.            Assessment and Plan     1. Migraine without status migrainosus, not intractable, unspecified migraine type  -     butalbital-acetaminophen-caffeine -40 mg (FIORICET, ESGIC) -40 mg per tablet; Take 1 tablet by mouth once daily.  Dispense: 10 tablet; Refill: 0    2. Mixed hyperlipidemia    3. Primary hypertension  Overview:  2023      4. Acute right ankle pain  -     X-Ray Ankle Complete 3 View Right; Future; Expected date: 04/16/2024  -     Ambulatory referral/consult to Orthopedics; Future; Expected date: 04/23/2024    5. Right foot pain  -     X-Ray Foot Complete 3 view Right; Future; Expected date:  04/16/2024    6. Chronic right-sided low back pain with right-sided sciatica  -     Ambulatory referral/consult to Orthopedics; Future; Expected date: 04/23/2024    7. PCOS (polycystic ovarian syndrome)    8. History of left heart catheterization  Overview:  4/23 - normal coronary arteries      9. Encounter for general adult medical examination with abnormal findings        Plan:  Per orders and D/C instructions.  Check routine labs.  She is up-to-date on mammogram and colonoscopy.  She was encouraged to quit smoking.       Follow up in about 6 months (around 10/16/2024).

## 2024-04-19 ENCOUNTER — TELEPHONE (OUTPATIENT)
Dept: OBSTETRICS AND GYNECOLOGY | Facility: CLINIC | Age: 54
End: 2024-04-19
Payer: COMMERCIAL

## 2024-04-19 NOTE — TELEPHONE ENCOUNTER
----- Message from Linda Lopez MD sent at 4/18/2024  5:01 PM CDT -----  Regarding: virtual  Can we please set up a virtual visit to discuss resuls and how she is doing on the meds?  Thank you

## 2024-04-23 ENCOUNTER — OFFICE VISIT (OUTPATIENT)
Dept: OBSTETRICS AND GYNECOLOGY | Facility: CLINIC | Age: 54
End: 2024-04-23
Attending: STUDENT IN AN ORGANIZED HEALTH CARE EDUCATION/TRAINING PROGRAM
Payer: COMMERCIAL

## 2024-04-23 DIAGNOSIS — Z79.899 MEDICATION MANAGEMENT: Primary | ICD-10-CM

## 2024-04-23 DIAGNOSIS — R10.2 PELVIC PAIN IN FEMALE: ICD-10-CM

## 2024-04-23 DIAGNOSIS — N89.8 VAGINAL DISCHARGE: ICD-10-CM

## 2024-04-23 DIAGNOSIS — N95.1 HOT FLUSHES, PERIMENOPAUSAL: ICD-10-CM

## 2024-04-23 PROCEDURE — 3044F HG A1C LEVEL LT 7.0%: CPT | Mod: CPTII,95,, | Performed by: STUDENT IN AN ORGANIZED HEALTH CARE EDUCATION/TRAINING PROGRAM

## 2024-04-23 PROCEDURE — 99213 OFFICE O/P EST LOW 20 MIN: CPT | Mod: 95,,, | Performed by: STUDENT IN AN ORGANIZED HEALTH CARE EDUCATION/TRAINING PROGRAM

## 2024-04-23 RX ORDER — METRONIDAZOLE 7.5 MG/G
1 GEL VAGINAL DAILY
Qty: 5 APPLICATOR | Refills: 0 | Status: SHIPPED | OUTPATIENT
Start: 2024-04-23 | End: 2024-04-30

## 2024-04-23 NOTE — PROGRESS NOTES
The patient location is: Home  The chief complaint leading to consultation is: Vaginal irritation, hot flushes, pelvic pain    Visit type: audiovisual    Face to Face time with patient: 8  20 minutes of total time spent on the encounter, which includes face to face time and non-face to face time preparing to see the patient (eg, review of tests), Obtaining and/or reviewing separately obtained history, Documenting clinical information in the electronic or other health record, Independently interpreting results (not separately reported) and communicating results to the patient/family/caregiver, or Care coordination (not separately reported).         Each patient to whom he or she provides medical services by telemedicine is:  (1) informed of the relationship between the physician and patient and the respective role of any other health care provider with respect to management of the patient; and (2) notified that he or she may decline to receive medical services by telemedicine and may withdraw from such care at any time.    Notes:     Chief Complaint: Vaginal irritation, hot flushes, pelvic pain     HPI:      Shoaib Esqueda is a 53 y.o.  who presents today for follow up.  Started on veozah for hot flushes and doing better.  Symptoms have improved greatly and wants to continue.  Occasionally will still have some pelvic pain but overall improved.  No vaginal bleeding since 2023.  Also reports some vaginal irritation again and odor similar to bacterial vaginosis.  LMP: Patient's last menstrual period was 2023 (approximate)..  No other issues, problems, or complaints.     OB History          3    Para        Term                AB   3    Living             SAB        IAB   3    Ectopic        Multiple        Live Births   0               Past Medical History:   Diagnosis Date    Hepatitis C      Rx w/ chemo x 3 months.  Current  viral load is zero  Recent IUD (copper)     Lung nodules      x 2    Migraine 09/05/2014    Nasal polyps 09/05/2014    PCOS (polycystic ovarian syndrome) 09/05/2014    PONV (postoperative nausea and vomiting)     PPD positive 10/27/2012    Uses contact lenses      Past Surgical History:   Procedure Laterality Date    angimeioma finger      AUGMENTATION OF BREAST Bilateral 1987    CORONARY ANGIOGRAPHY N/A 04/21/2023    Procedure: ANGIOGRAM, CORONARY ARTERY;  Surgeon: Esteban Restrepo MD;  Location: [facility] CATH LAB;  Service: Cardiology;  Laterality: N/A;  low bleeding risk 2.0%    DIAGNOSTIC LAPAROSCOPY N/A 7/18/2023    Procedure: LAPAROSCOPY, DIAGNOSTIC;  Surgeon: Linda Lopez MD;  Location: [facility];  Service: OB/GYN;  Laterality: N/A;  60 MINUTES    FACIAL RECONSTRUCTION SURGERY      flat feet repair Bilateral     HYSTEROSCOPY N/A 7/18/2023    Procedure: HYSTEROSCOPY;  Surgeon: Linda Lopez MD;  Location: [facility];  Service: OB/GYN;  Laterality: N/A;    SINUS SURGERY      x4     Social History     Socioeconomic History    Marital status: Single    Number of children: 0   Occupational History     Employer: [employer]    Occupation:  for [employer]     Employer: [employer]   Tobacco Use    Smoking status: Every Day     Current packs/day: 0.50     Average packs/day: 0.5 packs/day for 15.0 years (7.5 ttl pk-yrs)     Types: Cigarettes     Passive exposure: Past    Smokeless tobacco: Never    Tobacco comments:     Pt stop smoking 2/22/2023 after receiving CT Results   Substance and Sexual Activity    Alcohol use: Yes     Alcohol/week: 16.0 standard drinks of alcohol     Types: 2 Shots of liquor, 14 Standard drinks or equivalent per week     Comment: 2 shots per night    Drug use: No    Sexual activity: Yes     Partners: Male   Social History Narrative    Runs and lifts weights.    Father is a radiologist     Family History   Problem Relation Name Age of Onset    Hypertension Mother      Heart disease Father      Hypertension  Father      Cancer Maternal Aunt      Cancer Maternal Uncle      Breast cancer Neg Hx      Ovarian cancer Neg Hx         Current Outpatient Medications:     butalbital-acetaminophen-caffeine -40 mg (FIORICET, ESGIC) -40 mg per tablet, Take 1 tablet by mouth once daily., Disp: 10 tablet, Rfl: 0    cetirizine (ZYRTEC) 5 MG tablet, Take 5 mg by mouth once daily., Disp: , Rfl:     fezolinetant (VEOZAH) 45 mg Tab, Take 1 tablet (45 mg) by mouth once daily., Disp: 30 tablet, Rfl: 11    galcanezumab-gnlm (EMGALITY PEN) 120 mg/mL PnIj, ADMINISTER 1 ML(120 MG) UNDER THE SKIN EVERY 28 DAYS (Patient taking differently: ADMINISTER 1 ML(120 MG) UNDER THE SKIN EVERY 28 DAYS for migraines), Disp: 3 mL, Rfl: 3    metroNIDAZOLE (METROGEL) 0.75 % (37.5mg/5 gram) vaginal gel, Place 1 applicator vaginally once daily. for 7 days, Disp: 5 applicator, Rfl: 0    progesterone (PROMETRIUM) 100 MG capsule, TAKE 1 CAPSULE(100 MG) BY MOUTH EVERY EVENING, Disp: 90 capsule, Rfl: 3    propranoloL (INDERAL LA) 60 MG 24 hr capsule, Take 1 capsule (60 mg total) by mouth nightly., Disp: 90 capsule, Rfl: 3    ROS:     GENERAL: Denies unintentional weight gain or weight loss. Feeling well overall.   SKIN: Denies rash or lesions.   HEENT: Denies headaches, or vision changes.   ABDOMEN: Denies abdominal pain, constipation, diarrhea, nausea, vomiting, change in appetite.  URINARY: Denies frequency, dysuria, hematuria.  NEUROLOGIC: Denies syncope or weakness.   PSYCHIATRIC: Denies depression, anxiety or mood swings.    Physical Exam:      PHYSICAL EXAM:  LMP 2023 (Approximate)   There is no height or weight on file to calculate BMI.     APPEARANCE: Well nourished, well developed, in no acute distress.  PSYCH: Appropriate mood and affect.  SKIN: No acne or hirsutism.      Assessment/Plan:     53 y.o.     Medication management    Hot flushes, perimenopausal    Vaginal discharge    Pelvic pain in female    Other orders  -      metroNIDAZOLE (METROGEL) 0.75 % (37.5mg/5 gram) vaginal gel; Place 1 applicator vaginally once daily. for 7 days  Dispense: 5 applicator; Refill: 0          Counseling:     Continue current medications.  She has refills.  She will call if symptoms worsen or do not improve.  Prefers metrogel for bv treatment - sent over.  Recommend visit if symptoms do not improve or recur.  She is in agreement and all questions answered.  RTC for annual or sooner if needed.      Face to Face time with patient: 8  20 minutes of total time spent on the encounter, which includes face to face time and non-face to face time preparing to see the patient (eg, review of tests), Obtaining and/or reviewing separately obtained history, Documenting clinical information in the electronic or other health record, Independently interpreting results (not separately reported) and communicating results to the patient/family/caregiver, or Care coordination (not separately reported).

## 2024-05-19 NOTE — TELEPHONE ENCOUNTER
Refill Routing Note   Medication(s) are not appropriate for processing by [facility] for the following reason(s):        No active prescription written by provider-prescription was discontinued on 5/25/2023 by Romelia Chopra MD   Due for refill >6 months ago    [facility] action(s):  Defer        Medication Therapy Plan: prescription was discontinued on 5/25/2023 by Romelia Chopra MD      Appointments  past 12m or future 3m with PCP    Date Provider   Last Visit   4/23/2024 Linda Lopez MD   Next Visit   Visit date not found Linda Lopez MD   ED visits in past 90 days: 0        Note composed:2:37 PM 05/19/2024

## 2024-05-20 RX ORDER — VALACYCLOVIR HYDROCHLORIDE 1 G/1
TABLET, FILM COATED ORAL
Qty: 90 TABLET | Refills: 0 | Status: SHIPPED | OUTPATIENT
Start: 2024-05-20

## 2024-05-28 ENCOUNTER — TELEPHONE (OUTPATIENT)
Dept: PULMONOLOGY | Facility: CLINIC | Age: 54
End: 2024-05-28
Payer: COMMERCIAL

## 2024-05-28 NOTE — TELEPHONE ENCOUNTER
I spoke with patient. Patient scheduled with Dr Mendez on 6/17/24 at 8am for nodules with ct scan on 6/14/24 at 8am at [facility] former patient of Dr Oswald. Patient confirmed and verbalized understanding.

## 2024-05-28 NOTE — TELEPHONE ENCOUNTER
----- Message from Herb Guerrero sent at 5/28/2024 10:48 AM CDT -----  Regarding: Appt  Contact: [phone]  Pt calling to schedule with new provider for lung nodules. Was a pt of Dr. Oswald. Is due for next scan in June. Please call [phone]

## 2024-05-29 DIAGNOSIS — G43.909 MIGRAINE WITHOUT STATUS MIGRAINOSUS, NOT INTRACTABLE, UNSPECIFIED MIGRAINE TYPE: ICD-10-CM

## 2024-05-29 RX ORDER — GALCANEZUMAB 120 MG/ML
INJECTION, SOLUTION SUBCUTANEOUS
Qty: 3 ML | Refills: 3 | Status: SHIPPED | OUTPATIENT
Start: 2024-05-29

## 2024-06-14 ENCOUNTER — HOSPITAL ENCOUNTER (OUTPATIENT)
Dept: RADIOLOGY | Facility: OTHER | Age: 54
Discharge: HOME | End: 2024-06-14
Attending: INTERNAL MEDICINE
Payer: COMMERCIAL

## 2024-06-14 DIAGNOSIS — R91.8 PULMONARY NODULES: ICD-10-CM

## 2024-06-14 PROCEDURE — 71250 CT THORAX DX C-: CPT | Mod: 26,,, | Performed by: RADIOLOGY

## 2024-06-14 PROCEDURE — 71250 CT THORAX DX C-: CPT | Mod: TC

## 2024-06-16 DIAGNOSIS — R91.8 PULMONARY NODULES: Primary | ICD-10-CM

## 2024-06-17 ENCOUNTER — OFFICE VISIT (OUTPATIENT)
Dept: PULMONOLOGY | Facility: CLINIC | Age: 54
End: 2024-06-17
Payer: COMMERCIAL

## 2024-06-17 ENCOUNTER — TELEPHONE (OUTPATIENT)
Dept: CARDIOTHORACIC SURGERY | Facility: CLINIC | Age: 54
End: 2024-06-17
Payer: COMMERCIAL

## 2024-06-17 VITALS
HEIGHT: 66 IN | OXYGEN SATURATION: 98 % | HEART RATE: 70 BPM | DIASTOLIC BLOOD PRESSURE: 78 MMHG | BODY MASS INDEX: 23.27 KG/M2 | WEIGHT: 144.81 LBS | SYSTOLIC BLOOD PRESSURE: 122 MMHG

## 2024-06-17 DIAGNOSIS — R91.8 PULMONARY NODULES: ICD-10-CM

## 2024-06-17 DIAGNOSIS — F17.200 SMOKER: ICD-10-CM

## 2024-06-17 DIAGNOSIS — I77.819 ACQUIRED DILATION OF ASCENDING AORTA AND AORTIC ROOT: ICD-10-CM

## 2024-06-17 DIAGNOSIS — F17.210 CIGARETTE NICOTINE DEPENDENCE WITHOUT COMPLICATION: Primary | ICD-10-CM

## 2024-06-17 DIAGNOSIS — R91.1 SOLITARY PULMONARY NODULE: ICD-10-CM

## 2024-06-17 DIAGNOSIS — I77.819 DILATION OF AORTA: ICD-10-CM

## 2024-06-17 PROCEDURE — 99999 PR PBB SHADOW E&M-EST. PATIENT-LVL V: CPT | Mod: PBBFAC,,, | Performed by: INTERNAL MEDICINE

## 2024-06-17 PROCEDURE — 1159F MED LIST DOCD IN RCRD: CPT | Mod: CPTII,S$GLB,, | Performed by: INTERNAL MEDICINE

## 2024-06-17 PROCEDURE — 3074F SYST BP LT 130 MM HG: CPT | Mod: CPTII,S$GLB,, | Performed by: INTERNAL MEDICINE

## 2024-06-17 PROCEDURE — 1160F RVW MEDS BY RX/DR IN RCRD: CPT | Mod: CPTII,S$GLB,, | Performed by: INTERNAL MEDICINE

## 2024-06-17 PROCEDURE — 3044F HG A1C LEVEL LT 7.0%: CPT | Mod: CPTII,S$GLB,, | Performed by: INTERNAL MEDICINE

## 2024-06-17 PROCEDURE — 3008F BODY MASS INDEX DOCD: CPT | Mod: CPTII,S$GLB,, | Performed by: INTERNAL MEDICINE

## 2024-06-17 PROCEDURE — 99214 OFFICE O/P EST MOD 30 MIN: CPT | Mod: S$GLB,,, | Performed by: INTERNAL MEDICINE

## 2024-06-17 PROCEDURE — 3078F DIAST BP <80 MM HG: CPT | Mod: CPTII,S$GLB,, | Performed by: INTERNAL MEDICINE

## 2024-06-17 NOTE — ASSESSMENT & PLAN NOTE
Advised on cessation.   Interested in Chantix.  Refer to smoking cessation program to monitor progress and side effects for pharmaceutical efforts of cessation

## 2024-06-17 NOTE — TELEPHONE ENCOUNTER
Called to let patient know that Dr Lu is no longer taking new patients. Gave her the name of the other providers. She will call Dr Duke to see if she actually needs to be seen. She will call back when she decides who she wants to see.    Julie Haase RN  [facility] Nurse Navigator [phone]

## 2024-06-17 NOTE — PROGRESS NOTES
"Subjective:       Patient ID: Shoaib Esqueda is a 53 y.o. female.    Chief Complaint: Pulmonary Nodules    52 yo current smoker with a h/o latent TB treated in 2012.  Patient states at the time had elevated IgG levels and difficulty breathing. She was evaluated at [facility], where she had a positive TST and took INH for three months only after developing liver toxicity.  She is here today for follow up of pulmonary nodules.  Previously seen by Dr Oswald, she is new to me.  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 Still smoking approximately 3/4 ppd.  Average smoking history of one pack per day.    She has no infectious type symptoms currently.  No fevers, chills, night sweats.  No chronic cough.  No hemoptysis.  No sputum production.    Denies SLOAN.        Review of Systems    Objective:      Vitals:    06/17/24 0806   BP: 122/78   BP Location: Right arm   Patient Position: Sitting   Pulse: 70   SpO2: 98%   Weight: 65.7 kg (144 lb 13.5 oz)   Height: 5' 6" (1.676 m)      Physical Exam  Personal Diagnostic Review  CT of chest performed on 6/14/2024 without contrast revealed 2/2023 nodules are stable from previous.    Fusiform dilation of ascending aorta  .          6/17/2024     8:06 AM 4/16/2024     2:13 PM 12/1/2023     9:09 AM 10/19/2023    10:59 AM 9/27/2023     8:24 AM 7/31/2023     8:39 AM 7/18/2023    12:00 PM   Pulmonary Function Tests   SpO2 98 % 97 % 98 % 99 % 99 %  99 %   Height 5' 6" (1.676 m)  5' 6" (1.676 m) 5' 6" (1.676 m) 5' 6" (1.676 m) 5' 6" (1.676 m)    Weight 65.7 kg (144 lb 13.5 oz) 67.1 kg (148 lb) 66.7 kg (147 lb) 65.8 kg (145 lb) 66.8 kg (147 lb 4.3 oz) 66.5 kg (146 lb 9.7 oz)    BMI (Calculated) 23.4  23.7 23.4 23.8 23.7          Assessment:       1. Cigarette nicotine dependence without complication    2. Acquired dilation of ascending aorta and aortic root    3. Dilation of aorta    4. Solitary pulmonary nodule    5. Smoker    6. Pulmonary nodules  "       Outpatient Encounter Medications as of 6/17/2024   Medication Sig Dispense Refill    butalbital-acetaminophen-caffeine -40 mg (FIORICET, ESGIC) -40 mg per tablet Take 1 tablet by mouth once daily. 10 tablet 0    cetirizine (ZYRTEC) 5 MG tablet Take 5 mg by mouth once daily.      EMGALITY  mg/mL PnIj ADMINISTER 1 ML(120 MG) UNDER THE SKIN EVERY 28 DAYS 3 mL 3    fezolinetant (VEOZAH) 45 mg Tab Take 1 tablet (45 mg) by mouth once daily. 30 tablet 11    progesterone (PROMETRIUM) 100 MG capsule TAKE 1 CAPSULE(100 MG) BY MOUTH EVERY EVENING 90 capsule 3    propranoloL (INDERAL LA) 60 MG 24 hr capsule Take 1 capsule (60 mg total) by mouth nightly. 90 capsule 3    valACYclovir (VALTREX) 1000 MG tablet TAKE 1 TABLET(1000 MG) BY MOUTH EVERY DAY 90 tablet 0    [DISCONTINUED] galcanezumab-gnlm (EMGALITY PEN) 120 mg/mL PnIj ADMINISTER 1 ML(120 MG) UNDER THE SKIN EVERY 28 DAYS (Patient taking differently: ADMINISTER 1 ML(120 MG) UNDER THE SKIN EVERY 28 DAYS for migraines) 3 mL 3     No facility-administered encounter medications on file as of 6/17/2024.     Orders Placed This Encounter   Procedures    CT Chest Lung Screening Low Dose     Standing Status:   Future     Standing Expiration Date:   12/17/2025     Order Specific Question:   Is there documentation of shared decision making for this lung screening exam?     Answer:   Yes     Order Specific Question:   Is the patient pregnant?     Answer:   No     Order Specific Question:   Is the patient a current smoker?     Answer:   Yes     Order Specific Question:   Does the patient have a 20-pack/year or greater smoke history?     Answer:   Yes     Order Specific Question:   Is the patient between the ages 50-80 years old?     Answer:   Yes     Order Specific Question:   Does the patient show any signs or symptoms of lung cancer?     Answer:   No     Order Specific Question:   Is this the first (baseline) CT or an annual exam?     Answer:   Annual [2]      Order Specific Question:   May the Radiologist modify the order per protocol to meet the clinical needs of the patient?     Answer:   Yes     Order Specific Question:   Is this a low dose screening chest CT?     Answer:   Yes    Ambulatory referral/consult to Cardiovascular Surgery     Standing Status:   Future     Standing Expiration Date:   7/17/2025     Referral Priority:   Routine     Referral Type:   Consultation     Referral Reason:   Specialty Services Required     Requested Specialty:   Cardiothoracic Surgery     Number of Visits Requested:   1    Ambulatory referral/consult to Smoking Cessation Program     Standing Status:   Future     Standing Expiration Date:   7/17/2025     Referral Priority:   Routine     Referral Type:   Consultation     Referral Reason:   Specialty Services Required     Requested Specialty:   CTTS     Number of Visits Requested:   1     Plan:     Problem List Items Addressed This Visit       Smoker    Current Assessment & Plan     Advised on cessation.   Interested in Chantix.  Refer to smoking cessation program to monitor progress and side effects for pharmaceutical efforts of cessation          Pulmonary nodules    Overview     Initially detected 2/2023  Stable on current imaging 6/2024  Report of pleural based nodule 5 mm  Will begin LDCT screen on a yearly basis, next due 6/2025          Other Visit Diagnoses       Cigarette nicotine dependence without complication    -  Primary    Relevant Orders    Ambulatory referral/consult to Smoking Cessation Program    Acquired dilation of ascending aorta and aortic root        Dilation of aorta        Relevant Orders    Ambulatory referral/consult to Cardiovascular Surgery    Solitary pulmonary nodule        Relevant Orders    CT Chest Lung Screening Low Dose        35 minutes of total time spent on the encounter, which includes face to face time and non-face to face time preparing to see the patient (eg, review of tests), Obtaining and/or  reviewing separately obtained history, Documenting clinical information in the electronic or other health record, Independently interpreting results (not separately reported) and communicating results to the patient/family/caregiver, or Care coordination (not separately reported).  This included shared decision making regarding benefits and risks of annual lung cancer screening with CT, detection of nodules and benefits of smoking cessation.

## 2024-06-20 ENCOUNTER — HOSPITAL ENCOUNTER (OUTPATIENT)
Dept: RADIOLOGY | Facility: OTHER | Age: 54
Discharge: HOME | End: 2024-06-20
Attending: INTERNAL MEDICINE
Payer: COMMERCIAL

## 2024-06-20 ENCOUNTER — PATIENT MESSAGE (OUTPATIENT)
Dept: CARDIOTHORACIC SURGERY | Facility: CLINIC | Age: 54
End: 2024-06-20
Payer: COMMERCIAL

## 2024-06-20 DIAGNOSIS — Z12.31 ENCOUNTER FOR SCREENING MAMMOGRAM FOR BREAST CANCER: ICD-10-CM

## 2024-06-20 PROCEDURE — 77067 SCR MAMMO BI INCL CAD: CPT | Mod: TC

## 2024-06-26 ENCOUNTER — TELEPHONE (OUTPATIENT)
Dept: PULMONOLOGY | Facility: CLINIC | Age: 54
End: 2024-06-26
Payer: COMMERCIAL

## 2024-06-26 NOTE — TELEPHONE ENCOUNTER
I spoke with patient. She stated her referral with cardiovascular surgery that was placed by Dr Mendez was canceled due to physician she was scheduled with is leaving [facility] and now she is being told she needs a new referral to be scheduled with Dr Norris. I let her know I would send a message to Dr Mendez for review and once advised I will follow up with her. Patient verbalized understanding.

## 2024-06-26 NOTE — TELEPHONE ENCOUNTER
----- Message from Benjamin Watkins sent at 6/26/2024 10:53 AM CDT -----  Consult/Advisory  Shoaib   Name Of Caller:      Contact Preference:[phone]    Nature of call: Ptn called regarding a referral she stated Dr Mendez was supposed to send to another dr for her she tried to set a appt she had issue   please call ptn to assist

## 2024-06-27 ENCOUNTER — TELEPHONE (OUTPATIENT)
Dept: CARDIOTHORACIC SURGERY | Facility: CLINIC | Age: 54
End: 2024-06-27
Payer: COMMERCIAL

## 2024-06-27 NOTE — TELEPHONE ENCOUNTER
Called pt to schedule consultation for TAA eval. Pt elected to wait until late July so she could come for an early morning appointment. Pt verbalized understanding of appointment date, time, and location.

## 2024-07-09 ENCOUNTER — PATIENT MESSAGE (OUTPATIENT)
Dept: OTOLARYNGOLOGY | Facility: CLINIC | Age: 54
End: 2024-07-09
Payer: COMMERCIAL

## 2024-07-18 ENCOUNTER — HOSPITAL ENCOUNTER (OUTPATIENT)
Dept: RADIOLOGY | Facility: OTHER | Age: 54
Discharge: HOME | End: 2024-07-18
Attending: ORTHOPAEDIC SURGERY
Payer: COMMERCIAL

## 2024-07-18 DIAGNOSIS — M41.9 SCOLIOSIS, UNSPECIFIED: ICD-10-CM

## 2024-07-18 PROCEDURE — 72146 MRI CHEST SPINE W/O DYE: CPT | Mod: TC

## 2024-07-18 PROCEDURE — 72148 MRI LUMBAR SPINE W/O DYE: CPT | Mod: TC

## 2024-07-18 PROCEDURE — 72148 MRI LUMBAR SPINE W/O DYE: CPT | Mod: 26,,, | Performed by: RADIOLOGY

## 2024-07-18 PROCEDURE — 72146 MRI CHEST SPINE W/O DYE: CPT | Mod: 26,,, | Performed by: RADIOLOGY

## 2024-07-19 ENCOUNTER — TELEPHONE (OUTPATIENT)
Dept: OBSTETRICS AND GYNECOLOGY | Facility: CLINIC | Age: 54
End: 2024-07-19
Payer: COMMERCIAL

## 2024-07-19 NOTE — TELEPHONE ENCOUNTER
"Pt states she stopped taking progesterone 2 weeks ago d/t feeling lethargic all the time and having hair loss.  Has started having more hot flashes.  Feeling sluggish. Last night woke up with a hot flash, went to the bathroom and blacked out on the toilet. Has been feeling sluggish.  She is also on a beta blocker.  Asking if stopping progesterone could cause her to black out.  Advised symptoms are likely cardiac related and she should reach out to cardiologist.  Fired cardiologist for prescribing the wrong medication that "almost killed her" and that was the doctor that previously told her heart problems are from her PCOS.  When she has a hot flash feels like she is having a heart attack d/t the palpitations.  Says she probably would be dead if she hadn't started the Veozah so thinks hormones and her heart problems are related.  She has been on progesterone since ~2020.  She did not go to the ER after syncopal episode.      She will call Dr. Duke.    "

## 2024-07-22 NOTE — PROGRESS NOTES
Subjective:      Patient ID: Shoaib Esqueda is a 54 y.o. female.    Chief Complaint: No chief complaint on file.      HPI:  Shoaib Esqueda is a 54 y.o. female who presents to an initial surgical evaluation for TAA. Medical conditions includes HCV s/p treatment, HTN, mix HLD, lung nodules, tobacco use. Patient has known about aneurysm for two years. She underwent annual follow up for pulmonary nodules in which a stable  4.2 cm TAA was noted on CT. Previous scan in Nov 2023 showed TAA of 4 cm.     She endorses intermittent chest pain that happens at rest. She reports that she had COVID-19 infection in January 2023 and since then has been experiencing chest pain. The chest pain is described as a tightness or squeezing sensation. No associated sob. Does have SLOAN when taking stairs and walking on treadmill with physical therapy for broken ankle. She lives a very sedentary lifestyle, notes inactivity due to desk job. Denies palpitation, LE edema. No prior strokes, stents,sternotomies. Independently performs ADLS without issues. No AD for walking. Does not monitor BP daily.  Reports BP remains elevated on propanolol, SBP 140s.  Smoking approximately 1/2 ppd x 30 years. Average smoking history of one pack per day. No family hx of aneurysm or connective tissue disorder. Father underwent MVR.     Of note, she underwent ischemic evaluation for chest pain in April with an abnormal stress test. Subsequently underwent LHC with Dr. Restrepo that showed minimal luminal irregularities in the LCx and the remainder of the coronary arteries appear normal.         Family and social history reviewed    Review of patient's allergies indicates:   Allergen Reactions    Amoxicillin Swelling     Vast swelling of right leg    Pcn [penicillins] Swelling     Reaction was to amoxicillin when very young; VAST swelling of leg    Imdur [isosorbide mononitrate]      Hypotension and headaches    Corticosteroids (glucocorticoids) Anxiety     Past  Medical History:   Diagnosis Date    Hepatitis C     2000 Rx w/ chemo x 3 months.  Current  viral load is zero  Recent IUD (copper)     Lung nodules     x 2    Migraine 09/05/2014    Nasal polyps 09/05/2014    PCOS (polycystic ovarian syndrome) 09/05/2014    PONV (postoperative nausea and vomiting)     PPD positive 10/27/2012    Uses contact lenses      Past Surgical History:   Procedure Laterality Date    angimeioma finger      AUGMENTATION OF BREAST Bilateral 1987    CORONARY ANGIOGRAPHY N/A 04/21/2023    Procedure: ANGIOGRAM, CORONARY ARTERY;  Surgeon: Esteban Restrepo MD;  Location: [facility] CATH LAB;  Service: Cardiology;  Laterality: N/A;  low bleeding risk 2.0%    DIAGNOSTIC LAPAROSCOPY N/A 7/18/2023    Procedure: LAPAROSCOPY, DIAGNOSTIC;  Surgeon: Linda Lopez MD;  Location: [facility] OR;  Service: OB/GYN;  Laterality: N/A;  60 MINUTES    FACIAL RECONSTRUCTION SURGERY      flat feet repair Bilateral     HYSTEROSCOPY N/A 7/18/2023    Procedure: HYSTEROSCOPY;  Surgeon: Linda Lopez MD;  Location: [facility];  Service: OB/GYN;  Laterality: N/A;    SINUS SURGERY      x4     Family History       Problem Relation (Age of Onset)    Cancer Maternal Aunt, Maternal Uncle    Heart disease Father    Hypertension Mother, Father          Social History     Socioeconomic History    Marital status: Single    Number of children: 0   Occupational History     Employer: [employer]    Occupation:  for [employer]     Employer: [employer]   Tobacco Use    Smoking status: Every Day     Current packs/day: 0.75     Average packs/day: 0.8 packs/day for 38.6 years (32.7 ttl pk-yrs)     Types: Cigarettes     Start date: 1986     Passive exposure: Past    Smokeless tobacco: Never    Tobacco comments:     Pt stop smoking 2/22/2023 after receiving CT Results   Substance and Sexual Activity    Alcohol use: Yes     Alcohol/week: 16.0 standard drinks of alcohol     Types: 2 Shots of liquor, 14  Standard drinks or equivalent per week     Comment: 2 shots per night    Drug use: No    Sexual activity: Yes     Partners: Male   Social History Narrative    Runs and lifts weights.    Father is a radiologist       Current medications Reviewed  Current Outpatient Medications on File Prior to Visit   Medication Sig Dispense Refill    butalbital-acetaminophen-caffeine -40 mg (FIORICET, ESGIC) -40 mg per tablet Take 1 tablet by mouth once daily. 10 tablet 0    cetirizine (ZYRTEC) 5 MG tablet Take 5 mg by mouth once daily.      EMGALITY  mg/mL PnIj ADMINISTER 1 ML(120 MG) UNDER THE SKIN EVERY 28 DAYS 3 mL 3    fezolinetant (VEOZAH) 45 mg Tab Take 1 tablet (45 mg) by mouth once daily. 30 tablet 11    progesterone (PROMETRIUM) 100 MG capsule TAKE 1 CAPSULE(100 MG) BY MOUTH EVERY EVENING 90 capsule 3    propranoloL (INDERAL LA) 60 MG 24 hr capsule Take 1 capsule (60 mg total) by mouth nightly. 90 capsule 3    valACYclovir (VALTREX) 1000 MG tablet TAKE 1 TABLET(1000 MG) BY MOUTH EVERY DAY 90 tablet 0     No current facility-administered medications on file prior to visit.       Review of Systems   Constitutional:  Positive for fatigue. Negative for activity change, appetite change and fever.   HENT:  Negative for nosebleeds.    Respiratory:  Positive for shortness of breath. Negative for cough.    Cardiovascular:  Positive for chest pain. Negative for palpitations and leg swelling.   Gastrointestinal:  Negative for abdominal distention, abdominal pain and nausea.   Genitourinary:  Negative for frequency.   Musculoskeletal:  Negative for arthralgias and myalgias.   Skin:  Negative for rash.   Neurological:  Negative for dizziness and numbness.   Hematological:  Does not bruise/bleed easily.     Objective:   Physical Exam  Constitutional:       Appearance: Normal appearance.   HENT:      Head: Normocephalic and atraumatic.   Eyes:      Extraocular Movements: Extraocular movements intact.   Cardiovascular:       Rate and Rhythm: Normal rate.   Pulmonary:      Effort: Pulmonary effort is normal.   Abdominal:      General: Abdomen is flat.      Palpations: Abdomen is soft.   Musculoskeletal:         General: Normal range of motion.      Cervical back: Normal range of motion.   Skin:     General: Skin is warm and dry.      Capillary Refill: Capillary refill takes less than 2 seconds.      Coloration: Skin is not pale.   Neurological:      General: No focal deficit present.      Mental Status: She is alert.         Diagnostic Results: reviewed   CT chest 6/2024  Fusiform dilatation ascending thoracic aorta, 4.2 cm. Mild calcified plaque along the aortic arch.     LHC 4/2023:    Minimal luminal irregularities in LCx on angiography. The remainder of the coronary arteries appear normal on angiography.     Stress ECHO 4/14/2023:  The left ventricle is normal in size with normal systolic function.  The estimated ejection fraction is 58%.  There are mild resting segmental left ventricular wall motion abnormalities.  There is abnormal septal wall motion.  Normal left ventricular diastolic function.  Normal right ventricular size with normal right ventricular systolic function.  Mild aortic regurgitation.  Normal central venous pressure (3 mmHg).  The estimated PA systolic pressure is 25 mmHg.  The test was stopped because the patient experienced fatigue.  The patient's exercise capacity was average.  During stress, the following significant arrhythmias were observed: occasional PVCs.  The ECG portion of this study is negative for myocardial ischemia.  The stress echo portion of this study is positive for myocardial ischemia.LVEF remains the same or slightly drops to low to mid 50s.Several walls stayed the same , eukinetic, and the distal anteroseptum worsened.    CT chest 11/2023  Fusiform dilatation ascending thoracic aorta measuring 4 cm..  Assessment:   TAA   Plan:     CTS Attending Note:    I have personally taken the history  and examined this patient and agree with the AGATA's note as stated above.  54-year-old woman who is followed by Pulmonary for a multitude of lung issues, including nodules, smoking, and a history of COVID.  She has been found to have an ascending aortic aneurysm on imaging.  I compared a study from February of 2023 to the most recent, which was done on June 14th.  Her ascending aorta did not demonstrate significant change over that interval, remaining stable in size at roughly 4.1 cm in diameter at the level of the right pulmonary artery.  I had a good discussion with the patient about her situation.  We discussed smoking cessation.  She is on propranolol currently, which is an excellent drug in the setting of aneurysmal disease.  However, she is bradycardic and is still somewhat hypertensive.  I think it would be reasonable to add a 2nd agent.  We will ask Dr. Sonya Mcnamara to see her for cardiology and blood pressure management.  As there were previous films available for comparison, a six-month CT scan is not indicated.  I will see her next June with a repeat CT.

## 2024-07-29 ENCOUNTER — TELEPHONE (OUTPATIENT)
Dept: CARDIOTHORACIC SURGERY | Facility: CLINIC | Age: 54
End: 2024-07-29
Payer: COMMERCIAL

## 2024-07-30 ENCOUNTER — OFFICE VISIT (OUTPATIENT)
Dept: CARDIOTHORACIC SURGERY | Facility: CLINIC | Age: 54
End: 2024-07-30
Payer: COMMERCIAL

## 2024-07-30 ENCOUNTER — TELEPHONE (OUTPATIENT)
Dept: CARDIOLOGY | Facility: CLINIC | Age: 54
End: 2024-07-30
Payer: COMMERCIAL

## 2024-07-30 VITALS
SYSTOLIC BLOOD PRESSURE: 152 MMHG | BODY MASS INDEX: 23.72 KG/M2 | DIASTOLIC BLOOD PRESSURE: 91 MMHG | HEART RATE: 45 BPM | OXYGEN SATURATION: 98 % | WEIGHT: 147.63 LBS | HEIGHT: 66 IN

## 2024-07-30 DIAGNOSIS — I10 PRIMARY HYPERTENSION: Primary | ICD-10-CM

## 2024-07-30 DIAGNOSIS — I71.21 ANEURYSM OF ASCENDING AORTA WITHOUT RUPTURE: Primary | ICD-10-CM

## 2024-07-30 DIAGNOSIS — Z76.89 ENCOUNTER TO ESTABLISH CARE: ICD-10-CM

## 2024-07-30 PROCEDURE — 3077F SYST BP >= 140 MM HG: CPT | Mod: CPTII,S$GLB,, | Performed by: THORACIC SURGERY (CARDIOTHORACIC VASCULAR SURGERY)

## 2024-07-30 PROCEDURE — 99999 PR PBB SHADOW E&M-EST. PATIENT-LVL III: CPT | Mod: PBBFAC,,, | Performed by: THORACIC SURGERY (CARDIOTHORACIC VASCULAR SURGERY)

## 2024-07-30 PROCEDURE — 3008F BODY MASS INDEX DOCD: CPT | Mod: CPTII,S$GLB,, | Performed by: THORACIC SURGERY (CARDIOTHORACIC VASCULAR SURGERY)

## 2024-07-30 PROCEDURE — 3080F DIAST BP >= 90 MM HG: CPT | Mod: CPTII,S$GLB,, | Performed by: THORACIC SURGERY (CARDIOTHORACIC VASCULAR SURGERY)

## 2024-07-30 PROCEDURE — 99204 OFFICE O/P NEW MOD 45 MIN: CPT | Mod: S$GLB,,, | Performed by: THORACIC SURGERY (CARDIOTHORACIC VASCULAR SURGERY)

## 2024-07-30 PROCEDURE — 3044F HG A1C LEVEL LT 7.0%: CPT | Mod: CPTII,S$GLB,, | Performed by: THORACIC SURGERY (CARDIOTHORACIC VASCULAR SURGERY)

## 2024-07-30 PROCEDURE — 1159F MED LIST DOCD IN RCRD: CPT | Mod: CPTII,S$GLB,, | Performed by: THORACIC SURGERY (CARDIOTHORACIC VASCULAR SURGERY)

## 2024-07-30 NOTE — TELEPHONE ENCOUNTER
Contacted patient regarding follow up appointment with  for HTN. Stated no availability until November. Will schedule a visit with physician assistant.

## 2024-08-05 ENCOUNTER — OFFICE VISIT (OUTPATIENT)
Dept: CARDIOLOGY | Facility: CLINIC | Age: 54
End: 2024-08-05
Payer: COMMERCIAL

## 2024-08-05 VITALS
BODY MASS INDEX: 23.38 KG/M2 | HEART RATE: 72 BPM | WEIGHT: 145.5 LBS | SYSTOLIC BLOOD PRESSURE: 140 MMHG | DIASTOLIC BLOOD PRESSURE: 93 MMHG | HEIGHT: 66 IN | OXYGEN SATURATION: 98 %

## 2024-08-05 DIAGNOSIS — Z98.890 HISTORY OF LEFT HEART CATHETERIZATION: ICD-10-CM

## 2024-08-05 DIAGNOSIS — F17.200 SMOKER: ICD-10-CM

## 2024-08-05 DIAGNOSIS — I10 PRIMARY HYPERTENSION: Primary | ICD-10-CM

## 2024-08-05 DIAGNOSIS — E78.2 MIXED HYPERLIPIDEMIA: ICD-10-CM

## 2024-08-05 DIAGNOSIS — I25.118 CORONARY ARTERY DISEASE OF NATIVE ARTERY OF NATIVE HEART WITH STABLE ANGINA PECTORIS: ICD-10-CM

## 2024-08-05 DIAGNOSIS — Z76.89 ENCOUNTER TO ESTABLISH CARE: ICD-10-CM

## 2024-08-05 DIAGNOSIS — I71.21 ANEURYSM OF ASCENDING AORTA WITHOUT RUPTURE: ICD-10-CM

## 2024-08-05 PROCEDURE — 3008F BODY MASS INDEX DOCD: CPT | Mod: CPTII,S$GLB,,

## 2024-08-05 PROCEDURE — 3044F HG A1C LEVEL LT 7.0%: CPT | Mod: CPTII,S$GLB,,

## 2024-08-05 PROCEDURE — 99999 PR PBB SHADOW E&M-EST. PATIENT-LVL IV: CPT | Mod: PBBFAC,,,

## 2024-08-05 PROCEDURE — 1159F MED LIST DOCD IN RCRD: CPT | Mod: CPTII,S$GLB,,

## 2024-08-05 PROCEDURE — 3080F DIAST BP >= 90 MM HG: CPT | Mod: CPTII,S$GLB,,

## 2024-08-05 PROCEDURE — 3077F SYST BP >= 140 MM HG: CPT | Mod: CPTII,S$GLB,,

## 2024-08-05 PROCEDURE — 99214 OFFICE O/P EST MOD 30 MIN: CPT | Mod: S$GLB,,,

## 2024-08-05 RX ORDER — ROSUVASTATIN CALCIUM 5 MG/1
5 TABLET, COATED ORAL DAILY
Qty: 90 TABLET | Refills: 3 | Status: SHIPPED | OUTPATIENT
Start: 2024-08-05 | End: 2025-08-05

## 2024-08-05 RX ORDER — HYDROCHLOROTHIAZIDE 12.5 MG/1
12.5 TABLET ORAL DAILY
Qty: 90 TABLET | Refills: 3 | Status: SHIPPED | OUTPATIENT
Start: 2024-08-05 | End: 2025-08-05

## 2024-08-16 ENCOUNTER — LAB VISIT (OUTPATIENT)
Dept: LAB | Facility: OTHER | Age: 54
End: 2024-08-16
Payer: COMMERCIAL

## 2024-08-16 DIAGNOSIS — I10 PRIMARY HYPERTENSION: ICD-10-CM

## 2024-08-16 LAB
ALBUMIN SERPL BCP-MCNC: 4.3 G/DL (ref 3.5–5.2)
ALP SERPL-CCNC: 92 U/L (ref 55–135)
ALT SERPL W/O P-5'-P-CCNC: 20 U/L (ref 10–44)
ANION GAP SERPL CALC-SCNC: 10 MMOL/L (ref 8–16)
AST SERPL-CCNC: 23 U/L (ref 10–40)
BILIRUB SERPL-MCNC: 0.6 MG/DL (ref 0.1–1)
BUN SERPL-MCNC: 14 MG/DL (ref 6–20)
CALCIUM SERPL-MCNC: 10.2 MG/DL (ref 8.7–10.5)
CHLORIDE SERPL-SCNC: 100 MMOL/L (ref 95–110)
CO2 SERPL-SCNC: 27 MMOL/L (ref 23–29)
CREAT SERPL-MCNC: 0.8 MG/DL (ref 0.5–1.4)
EST. GFR  (NO RACE VARIABLE): >60 ML/MIN/1.73 M^2
GLUCOSE SERPL-MCNC: 96 MG/DL (ref 70–110)
POTASSIUM SERPL-SCNC: 4.1 MMOL/L (ref 3.5–5.1)
PROT SERPL-MCNC: 7.7 G/DL (ref 6–8.4)
SODIUM SERPL-SCNC: 137 MMOL/L (ref 136–145)

## 2024-08-16 PROCEDURE — 80053 COMPREHEN METABOLIC PANEL: CPT

## 2024-08-16 PROCEDURE — 36415 COLL VENOUS BLD VENIPUNCTURE: CPT

## 2024-08-18 RX ORDER — VALACYCLOVIR HYDROCHLORIDE 1 G/1
TABLET, FILM COATED ORAL
Qty: 90 TABLET | Refills: 2 | Status: SHIPPED | OUTPATIENT
Start: 2024-08-18

## 2024-08-18 NOTE — TELEPHONE ENCOUNTER
Refill Decision Note   Shoaib Esqueda  is requesting a refill authorization.  Brief Assessment and Rationale for Refill:  Approve     Medication Therapy Plan:         Comments:     Note composed:12:10 PM 08/18/2024

## 2024-09-19 ENCOUNTER — PATIENT MESSAGE (OUTPATIENT)
Dept: OBSTETRICS AND GYNECOLOGY | Facility: CLINIC | Age: 54
End: 2024-09-19
Payer: COMMERCIAL

## 2024-10-04 DIAGNOSIS — J32.9 SINUSITIS, UNSPECIFIED CHRONICITY, UNSPECIFIED LOCATION: Primary | ICD-10-CM

## 2024-10-04 RX ORDER — LEVOFLOXACIN 500 MG/1
500 TABLET, FILM COATED ORAL DAILY
Qty: 7 TABLET | Refills: 0 | Status: SHIPPED | OUTPATIENT
Start: 2024-10-04

## 2024-10-10 ENCOUNTER — OFFICE VISIT (OUTPATIENT)
Dept: OBSTETRICS AND GYNECOLOGY | Facility: CLINIC | Age: 54
End: 2024-10-10
Attending: STUDENT IN AN ORGANIZED HEALTH CARE EDUCATION/TRAINING PROGRAM
Payer: COMMERCIAL

## 2024-10-10 VITALS
SYSTOLIC BLOOD PRESSURE: 140 MMHG | WEIGHT: 147.69 LBS | BODY MASS INDEX: 23.74 KG/M2 | DIASTOLIC BLOOD PRESSURE: 82 MMHG | HEIGHT: 66 IN

## 2024-10-10 DIAGNOSIS — N95.1 HOT FLUSHES, PERIMENOPAUSAL: ICD-10-CM

## 2024-10-10 DIAGNOSIS — Z01.419 WELL WOMAN EXAM: ICD-10-CM

## 2024-10-10 DIAGNOSIS — Z13.820 ENCOUNTER FOR SCREENING FOR OSTEOPOROSIS: Primary | ICD-10-CM

## 2024-10-10 PROCEDURE — 99999 PR PBB SHADOW E&M-EST. PATIENT-LVL III: CPT | Mod: PBBFAC,,, | Performed by: STUDENT IN AN ORGANIZED HEALTH CARE EDUCATION/TRAINING PROGRAM

## 2024-10-10 NOTE — PROGRESS NOTES
Chief Complaint: Well Woman Exam     HPI:      Shoaib Esqueda is a 54 y.o.  who presents today for well woman exam.  LMP: Patient's last menstrual period was 2023 (approximate).  No issues, problems, or complaints. Specifically, patient denies vaginal bleeding, discharge, pelvic pain, urinary problems, or changes in appetite.   She is interested in testosterone.  Veozah is helping with the hot flushes.  Pelvic pain has resolved. Also would like to get dexa checked - history of steroid medication use.     Previous Pap: 2020 no abnormalities, hpv neg  Previous Mammogram:  negative  Most Recent Dexa: needs  Colonoscopy: utd    OB History          3    Para        Term                AB   3    Living             SAB        IAB   3    Ectopic        Multiple        Live Births   0               Past Medical History:   Diagnosis Date    Hepatitis C      Rx w/ chemo x 3 months.  Current  viral load is zero  Recent IUD (copper)     Lung nodules     x 2    Migraine 2014    Nasal polyps 2014    PCOS (polycystic ovarian syndrome) 2014    PONV (postoperative nausea and vomiting)     PPD positive 10/27/2012    Uses contact lenses      Past Surgical History:   Procedure Laterality Date    angimeioma finger      AUGMENTATION OF BREAST Bilateral     CORONARY ANGIOGRAPHY N/A 2023    Procedure: ANGIOGRAM, CORONARY ARTERY;  Surgeon: Esteban Restrepo MD;  Location: [facility] CATH LAB;  Service: Cardiology;  Laterality: N/A;  low bleeding risk 2.0%    DIAGNOSTIC LAPAROSCOPY N/A 2023    Procedure: LAPAROSCOPY, DIAGNOSTIC;  Surgeon: Linda Lopez MD;  [facility];  Service: OB/GYN;  Laterality: N/A;  60 MINUTES    FACIAL RECONSTRUCTION SURGERY      flat feet repair Bilateral     HYSTEROSCOPY N/A 2023    Procedure: HYSTEROSCOPY;  Surgeon: Linda Lopez MD;  Location: [facility] OR;  Service: OB/GYN;  Laterality: N/A;    SINUS SURGERY      x4      Social History     Socioeconomic History    Marital status: Single    Number of children: 0   Occupational History     Employer: [employer]    Occupation:  for [employer]     Employer: [employer]   Tobacco Use    Smoking status: Every Day     Current packs/day: 0.75     Average packs/day: 0.8 packs/day for 38.8 years (32.8 ttl pk-yrs)     Types: Cigarettes     Start date: 1986     Passive exposure: Past    Smokeless tobacco: Never    Tobacco comments:     Pt stop smoking 2/22/2023 after receiving CT Results   Substance and Sexual Activity    Alcohol use: Yes     Alcohol/week: 16.0 standard drinks of alcohol     Types: 2 Shots of liquor, 14 Standard drinks or equivalent per week     Comment: 2 shots per night    Drug use: No    Sexual activity: Yes     Partners: Male   Social History Narrative    Runs and lifts weights.    Father is a radiologist     Social Drivers of Health     Financial Resource Strain: High Risk (7/27/2024)    Overall Financial Resource Strain (CARDIA)     Difficulty of Paying Living Expenses: Hard   Food Insecurity: No Food Insecurity (7/27/2024)    Hunger Vital Sign     Worried About Running Out of Food in the Last Year: Never true     Ran Out of Food in the Last Year: Never true   Physical Activity: Inactive (7/27/2024)    Exercise Vital Sign     Days of Exercise per Week: 0 days     Minutes of Exercise per Session: 0 min   Stress: Patient Declined (7/27/2024)    [employer] [place] [employer] - Occupational Stress Questionnaire     Feeling of Stress : Patient declined   Housing Stability: Unknown (7/27/2024)    Housing Stability Vital Sign     Unable to Pay for Housing in the Last Year: No     Family History   Problem Relation Name Age of Onset    Hypertension Mother      Heart disease Father      Hypertension Father      Cancer Maternal Aunt      Cancer Maternal Uncle      Breast cancer Neg Hx      Ovarian cancer Neg Hx         Current  "Outpatient Medications:     butalbital-acetaminophen-caffeine -40 mg (FIORICET, ESGIC) -40 mg per tablet, Take 1 tablet by mouth once daily., Disp: 10 tablet, Rfl: 0    cetirizine (ZYRTEC) 5 MG tablet, Take 5 mg by mouth once daily., Disp: , Rfl:     EMGALITY  mg/mL PnIj, ADMINISTER 1 ML(120 MG) UNDER THE SKIN EVERY 28 DAYS, Disp: 3 mL, Rfl: 3    fezolinetant (VEOZAH) 45 mg Tab, Take 1 tablet (45 mg) by mouth once daily., Disp: 30 tablet, Rfl: 11    hydroCHLOROthiazide (HYDRODIURIL) 12.5 MG Tab, Take 1 tablet (12.5 mg total) by mouth once daily., Disp: 90 tablet, Rfl: 3    levoFLOXacin (LEVAQUIN) 500 MG tablet, Take 1 tablet (500 mg total) by mouth once daily., Disp: 7 tablet, Rfl: 0    progesterone (PROMETRIUM) 100 MG capsule, TAKE 1 CAPSULE(100 MG) BY MOUTH EVERY EVENING, Disp: 90 capsule, Rfl: 3    propranoloL (INDERAL LA) 60 MG 24 hr capsule, Take 1 capsule (60 mg total) by mouth nightly., Disp: 90 capsule, Rfl: 3    rosuvastatin (CRESTOR) 5 MG tablet, Take 1 tablet (5 mg total) by mouth once daily., Disp: 90 tablet, Rfl: 3    valACYclovir (VALTREX) 1000 MG tablet, TAKE 1 TABLET(1000 MG) BY MOUTH EVERY DAY, Disp: 90 tablet, Rfl: 2    ROS:     GENERAL: Denies unintentional weight gain or weight loss. Feeling well overall.   SKIN: Denies rash or lesions.   HEENT: Denies headaches, or vision changes.   CARDIOVASCULAR: Denies palpitations or chest pain.   RESPIRATORY: Denies shortness of breath or dyspnea on exertion.  BREASTS: Denies pain, lumps, or nipple discharge.   ABDOMEN: Denies abdominal pain, constipation, diarrhea, nausea, vomiting, change in appetite.  URINARY: Denies frequency, dysuria, hematuria.  NEUROLOGIC: Denies syncope or weakness.   PSYCHIATRIC: Denies depression, anxiety or mood swings.    Physical Exam:      PHYSICAL EXAM:  BP (!) 140/82   Ht 5' 6" (1.676 m)   Wt 67 kg (147 lb 11.3 oz)   LMP 05/17/2023 (Approximate)   BMI 23.84 kg/m²   Body mass index is 23.84 kg/m². "     APPEARANCE: Well nourished, well developed, in no acute distress.  PSYCH: Appropriate mood and affect.  SKIN: No acne or hirsutism.  NECK: Neck symmetric without masses or thyromegaly.  NODES: No inguinal, axillary, or supraclavicular lymph node enlargement.  CHEST: Normal respiratory effort.    BREASTS: Symmetrical, no skin changes or visible lesions.  No palpable masses or nipple discharge bilaterally.  ABDOMEN: Soft.  No tenderness or masses.    PELVIC: Normal external genitalia without lesions.  Normal hair distribution.  Adequate perineal body, normal urethral meatus.  Vagina moist and well rugated without lesions or discharge.  Cervix pink, without lesions, discharge or tenderness.  No significant cystocele or rectocele.  Bimanual exam shows uterus to be normal size, regular, mobile and nontender.  Adnexa without masses or tenderness.    EXTREMITIES: No edema.  No tenderness to palpation.  Female chaperone was present for exam.     Assessment/Plan:     54 y.o.     Encounter for screening for osteoporosis  -     DXA Bone Density Axial Skeleton 1 or more sites; Future; Expected date: 10/10/2024    Well woman exam    Hot flushes, perimenopausal  -     Ambulatory referral/consult to [facility]; Future; Expected date: 10/17/2024          Counselin.  Annual exam performed today without difficulty.  Patient was counseled today on current ASCCP pap guidelines, the recommendation for yearly pelvic exams, healthy diet and exercise routines, annual mammograms.  Mammogram utd.  Pap smear utd.  All questions answered.  Desires to continue veozah.  R/b/a reviewed and all questions answered.  Labs scheduled.  Hormone specialty clinic.  Dexa.    2.  Follow up with PCP for other health maintenance.  3.  Follow up in about 1 year (around 10/10/2025).     Use of the [facility] Patient Portal discussed and encouraged during today's visit.

## 2024-10-14 ENCOUNTER — CLINICAL SUPPORT (OUTPATIENT)
Dept: OBSTETRICS AND GYNECOLOGY | Facility: CLINIC | Age: 54
End: 2024-10-14
Payer: COMMERCIAL

## 2024-10-14 DIAGNOSIS — N95.1 MENOPAUSAL SYMPTOMS: Primary | ICD-10-CM

## 2024-10-14 NOTE — PROGRESS NOTES
[facility] Menopause Consultation Questionnaire  Personal Information  Full Name: Shoaib Esqueda 1970    Medical History  Do you have any chronic medical conditions? (e.g., diabetes, hypertension, thyroid issues)  If yes, please specify: HTN  Do you have a history of hormone-related conditions? (e.g., endometriosis, breast cancer)  If yes, please explain:  Endometriosis 7/2023 & PCOS  2014  Have you previously or are currently taking hormone replacement therapy?  If yes, please list: Veozah    Menstrual History  At what age did you begin menstruating?  Have you experienced any changes in your menstrual cycle in the last year?  Yes / No  If yes, please describe: Endometrial Ablation Periods stopped after 7/2023  When was your last menstrual period (or age of last period)?  Have you had a hysterectomy?  Yes / No  If yes, at what age?    Symptoms Assessment  Are you experiencing any of the following symptoms?  Hot flashes: Yes / No  Night sweats: Yes / No  Vaginal dryness or pain with intercourse: Yes / No  Mood swings: Yes / No  Anxiety or depression: Yes / No  Sleep disturbances: Yes / No  Fatigue:  Yes / No  Weight gain: Yes / No  Joint or muscle pain: Yes / No Back Injury   Memory issues or brain fog: Yes / No  Decreased interest in sext (low libido): Yes / No    Lifestyle Factors  How would you describe your diet?  Balanced / High in processed foods / Vegetarian / Other:  How often do you exercise?  Rarely / Occasionally / Regularly- Does not exercise   Do you smoke or consume alcohol?  Yes / No  If yes, please specify frequency: Drinks socially. She smokes a 1/2 pack a night   How would you rate your stress levels?  Low / Moderate / High     Family History  Is there a family history of menopause-related conditions? (e.g., osteoporosis, breast cancer)  Yes / No  If yes, please specify: Paternal Great Aunt Breast Cancer. Maternal GM osteoporosis   Is there a family history of heart disease?  Yes / No  If yes,  please specify: Father had valve replacement     She an aortic aneurysm, and heart attack, Covid 1/2023.  She is experience hair loss, and no appetite.

## 2024-10-15 ENCOUNTER — OFFICE VISIT (OUTPATIENT)
Dept: INTERNAL MEDICINE | Facility: CLINIC | Age: 54
End: 2024-10-15
Attending: INTERNAL MEDICINE
Payer: COMMERCIAL

## 2024-10-15 VITALS
TEMPERATURE: 98 F | DIASTOLIC BLOOD PRESSURE: 74 MMHG | BODY MASS INDEX: 23.46 KG/M2 | WEIGHT: 146 LBS | HEART RATE: 67 BPM | HEIGHT: 66 IN | SYSTOLIC BLOOD PRESSURE: 112 MMHG | OXYGEN SATURATION: 96 %

## 2024-10-15 DIAGNOSIS — J01.90 SUBACUTE SINUSITIS, UNSPECIFIED LOCATION: ICD-10-CM

## 2024-10-15 DIAGNOSIS — G43.909 MIGRAINE WITHOUT STATUS MIGRAINOSUS, NOT INTRACTABLE, UNSPECIFIED MIGRAINE TYPE: Primary | ICD-10-CM

## 2024-10-15 DIAGNOSIS — I71.21 ANEURYSM OF ASCENDING AORTA WITHOUT RUPTURE: ICD-10-CM

## 2024-10-15 DIAGNOSIS — I10 PRIMARY HYPERTENSION: ICD-10-CM

## 2024-10-15 DIAGNOSIS — E28.2 PCOS (POLYCYSTIC OVARIAN SYNDROME): ICD-10-CM

## 2024-10-15 DIAGNOSIS — E78.2 MIXED HYPERLIPIDEMIA: ICD-10-CM

## 2024-10-15 DIAGNOSIS — F17.200 SMOKER: ICD-10-CM

## 2024-10-15 PROCEDURE — 1159F MED LIST DOCD IN RCRD: CPT | Mod: CPTII,S$GLB,, | Performed by: INTERNAL MEDICINE

## 2024-10-15 PROCEDURE — 3008F BODY MASS INDEX DOCD: CPT | Mod: CPTII,S$GLB,, | Performed by: INTERNAL MEDICINE

## 2024-10-15 PROCEDURE — 3078F DIAST BP <80 MM HG: CPT | Mod: CPTII,S$GLB,, | Performed by: INTERNAL MEDICINE

## 2024-10-15 PROCEDURE — 99214 OFFICE O/P EST MOD 30 MIN: CPT | Mod: 25,S$GLB,, | Performed by: INTERNAL MEDICINE

## 2024-10-15 PROCEDURE — 1160F RVW MEDS BY RX/DR IN RCRD: CPT | Mod: CPTII,S$GLB,, | Performed by: INTERNAL MEDICINE

## 2024-10-15 PROCEDURE — 3074F SYST BP LT 130 MM HG: CPT | Mod: CPTII,S$GLB,, | Performed by: INTERNAL MEDICINE

## 2024-10-15 PROCEDURE — 96372 THER/PROPH/DIAG INJ SC/IM: CPT | Mod: S$GLB,,, | Performed by: INTERNAL MEDICINE

## 2024-10-15 PROCEDURE — 3044F HG A1C LEVEL LT 7.0%: CPT | Mod: CPTII,S$GLB,, | Performed by: INTERNAL MEDICINE

## 2024-10-15 RX ORDER — CLINDAMYCIN HYDROCHLORIDE 300 MG/1
300 CAPSULE ORAL EVERY 8 HOURS
Qty: 21 CAPSULE | Refills: 0 | Status: SHIPPED | OUTPATIENT
Start: 2024-10-15 | End: 2024-10-22

## 2024-10-15 RX ORDER — TRIAMCINOLONE ACETONIDE 40 MG/ML
40 INJECTION, SUSPENSION INTRA-ARTICULAR; INTRAMUSCULAR ONCE
Status: COMPLETED | OUTPATIENT
Start: 2024-10-15 | End: 2024-10-15

## 2024-10-15 RX ORDER — METHYLPREDNISOLONE ACETATE 80 MG/ML
80 INJECTION, SUSPENSION INTRA-ARTICULAR; INTRALESIONAL; INTRAMUSCULAR; SOFT TISSUE ONCE
Status: COMPLETED | OUTPATIENT
Start: 2024-10-15 | End: 2024-10-15

## 2024-10-15 RX ADMIN — TRIAMCINOLONE ACETONIDE 40 MG: 40 INJECTION, SUSPENSION INTRA-ARTICULAR; INTRAMUSCULAR at 01:10

## 2024-10-15 RX ADMIN — METHYLPREDNISOLONE ACETATE 80 MG: 80 INJECTION, SUSPENSION INTRA-ARTICULAR; INTRALESIONAL; INTRAMUSCULAR; SOFT TISSUE at 01:10

## 2024-10-15 NOTE — PROGRESS NOTES
Subjective     Patient ID: Shoaib Esqueda is a 54 y.o. female.    Chief Complaint: Follow-up (Has a pain in face on R side and may have a fever still not feeling the best with pain behind R eye, discussed this with you in the past and went to specialist was diagnosed with chronic sinusitis )    She took Levaquin for 1 week starting on October 4th for sinusitis.  She significantly improved, but still has pain, inflammation, and tenderness fine her right eye and above her right maxilla.  She is congested on her right side.      Hypertension  This is a chronic problem. The current episode started more than 1 year ago. The problem is controlled.           Follow-up  Associated symptoms include congestion and headaches. Pertinent negatives include no chest pain.     Review of Systems   Constitutional: Negative.    HENT:  Positive for nasal congestion and sinus pressure/congestion.    Respiratory:  Negative for shortness of breath.    Cardiovascular:  Negative for chest pain.   Neurological:  Positive for headaches.          Objective     Physical Exam  Vitals and nursing note reviewed.   Constitutional:       Appearance: She is well-developed.   HENT:      Head: Normocephalic.   Eyes:      Pupils: Pupils are equal, round, and reactive to light.   Cardiovascular:      Rate and Rhythm: Normal rate and regular rhythm.      Heart sounds: Normal heart sounds.   Pulmonary:      Effort: Pulmonary effort is normal.   Neurological:      Mental Status: She is alert.            Assessment and Plan     1. Migraine without status migrainosus, not intractable, unspecified migraine type    2. Mixed hyperlipidemia    3. Primary hypertension  Overview:  2023      4. Subacute sinusitis, unspecified location    5. PCOS (polycystic ovarian syndrome)    6. Smoker    7. Aneurysm of ascending aorta without rupture  Overview:  CT 11/23 - 4.0 cm  CT 6/24 - 4.2 cm      Other orders  -     methylPREDNISolone acetate injection 80 mg  -      triamcinolone acetonide injection 40 mg  -     clindamycin (CLEOCIN) 300 MG capsule; Take 1 capsule (300 mg total) by mouth every 8 (eight) hours. Take with food for 7 days  Dispense: 21 capsule; Refill: 0        PLAN:  Per orders and D/C instructions.  Continue diet and/or meds for migraines, PCOS, HTN, and high cholesterol, which are stable.  Follow-up with Cardiothoracic surgery for aneurysm of the ascending aorta.  Seven more days of antibiotics and Flonase for sinusitis.  She would like to try clindamycin.  Steroid shot today.    Between 30 and 39 min of total time for evaluation and management services were spent on the patient today.  The medical problems and treatment options were discussed, and all questions were answered.         Follow up in about 6 months (around 4/15/2025).

## 2024-10-30 NOTE — PROGRESS NOTES
"     General Cardiology Clinic Note  Reason for Visit: Hypertension  Last Clinic Visit: 08/05/24   General Cardiologist: Dr. Chopra    HPI:     Shoaib Esqueda is a 54 y.o. 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 Had been taking antibiotics for her symptoms and states she is feeling much better today. She was started on HCTZ 12.5 mg qd at our last clinic visit and presents with her home log today. Her blood pressure has been ranging in the 110-140's systolic with average range in the 120-130's. She is tolerating this medication well but admits to frequent bathroom breaks. She is still feeling stressed and overworked at her job but is staying active with walking. Patient denies any cardiac restrictions or limitations with activity. She endorses one episode of sharp chest pain at rest that occurred yesterday. She states the pain was non-radiating and lasted only seconds. She denies any cardiac complaints today despite having to walk to and from the parking lot twice to obtain her BP log after forgetting it initially. She is still smoking 1/2 PPD, however, adopted kittens and seem to be motivating her to cut back on smoking. She denies chest pain/pressure/tightness/discomfort, dyspnea on exertion, orthopnea, PND, peripheral edema, palpitations, syncope or claudication.    Last visit with me: 08/05/24   She presents today to discuss blood pressure management. Was previously on losartan but states this worsened her migraines. She was taking Imdur for chest pain and blood pressure management but states this medication caused a syncopal episode with worsening of chest pain. She does not check her blood pressure regularly but reports a home BP reading of 118/84 last night. Is still experiencing chest " "pain that is stable and unchanged from previous. She states the chest pain occurs daily around 4:30 pm, is non-radiating and not associated with other symptoms. She does not exercise regularly, currently limited by ankle injury for which she is participating in PT. Is working on getting back to the gym. She works a desk job, is pretty sedentary and not active on a day to day basis. She denies any difficulty walking from parking lot to the clinic. Does not follow a particular diet but avoids red meat and limits sodium intake. Is smoking 1/2 PPD and drinks "a couple" of alcoholic drinks daily. She denies dyspnea on exertion, orthopnea, PND, peripheral edema, palpitations, syncope or claudication.     ROS:    Pertinent ROS included in HPI and below.  PMH:     Past Medical History:   Diagnosis Date    Hepatitis C     2000 Rx w/ chemo x 3 months.  Current  viral load is zero  Recent IUD (copper)     Lung nodules     x 2    Migraine 09/05/2014    Nasal polyps 09/05/2014    PCOS (polycystic ovarian syndrome) 09/05/2014    PONV (postoperative nausea and vomiting)     PPD positive 10/27/2012    Uses contact lenses      Past Surgical History:   Procedure Laterality Date    angimeioma finger      AUGMENTATION OF BREAST Bilateral 1987    CORONARY ANGIOGRAPHY N/A 04/21/2023    Procedure: ANGIOGRAM, CORONARY ARTERY;  Surgeon: Esteban Restrepo MD;  Location: [facility] CATH LAB;  Service: Cardiology;  Laterality: N/A;  low bleeding risk 2.0%    DIAGNOSTIC LAPAROSCOPY N/A 7/18/2023    Procedure: LAPAROSCOPY, DIAGNOSTIC;  Surgeon: Linda Lopez MD;  Location: [facility] OR;  Service: OB/GYN;  Laterality: N/A;  60 MINUTES    FACIAL RECONSTRUCTION SURGERY      flat feet repair Bilateral     HYSTEROSCOPY N/A 7/18/2023    Procedure: HYSTEROSCOPY;  Surgeon: Linda Lopez MD;  Location: [facility] OR;  Service: OB/GYN;  Laterality: N/A;    SINUS SURGERY      x4     Allergies:     Review of patient's allergies indicates:   Allergen " Reactions    Amoxicillin Swelling     Vast swelling of right leg    Pcn [penicillins] Swelling     Reaction was to amoxicillin when very young; VAST swelling of leg    Imdur [isosorbide mononitrate]      Hypotension and headaches    Corticosteroids (glucocorticoids) Anxiety     Medications:     Current Outpatient Medications on File Prior to Visit   Medication Sig Dispense Refill    butalbital-acetaminophen-caffeine -40 mg (FIORICET, ESGIC) -40 mg per tablet Take 1 tablet by mouth once daily. 10 tablet 0    cetirizine (ZYRTEC) 5 MG tablet Take 5 mg by mouth once daily.      EMGALITY  mg/mL PnIj ADMINISTER 1 ML(120 MG) UNDER THE SKIN EVERY 28 DAYS 3 mL 3    fezolinetant (VEOZAH) 45 mg Tab Take 1 tablet (45 mg) by mouth once daily. 30 tablet 11    hydroCHLOROthiazide (HYDRODIURIL) 12.5 MG Tab Take 1 tablet (12.5 mg total) by mouth once daily. 90 tablet 3    progesterone (PROMETRIUM) 100 MG capsule TAKE 1 CAPSULE(100 MG) BY MOUTH EVERY EVENING 90 capsule 3    propranoloL (INDERAL LA) 60 MG 24 hr capsule Take 1 capsule (60 mg total) by mouth nightly. 90 capsule 3    rosuvastatin (CRESTOR) 5 MG tablet Take 1 tablet (5 mg total) by mouth once daily. 90 tablet 3    valACYclovir (VALTREX) 1000 MG tablet TAKE 1 TABLET(1000 MG) BY MOUTH EVERY DAY 90 tablet 2     No current facility-administered medications on file prior to visit.     Social History:     Social History     Tobacco Use    Smoking status: Every Day     Current packs/day: 0.75     Average packs/day: 0.8 packs/day for 38.8 years (32.9 ttl pk-yrs)     Types: Cigarettes     Start date: 1986     Passive exposure: Past    Smokeless tobacco: Never    Tobacco comments:     Pt stop smoking 2/22/2023 after receiving CT Results   Substance Use Topics    Alcohol use: Yes     Alcohol/week: 16.0 standard drinks of alcohol     Types: 2 Shots of liquor, 14 Standard drinks or equivalent per week     Comment: 2 shots per night     Family History:     Family  "History   Problem Relation Name Age of Onset    Hypertension Mother      Heart disease Father      Hypertension Father      Cancer Maternal Aunt      Cancer Maternal Uncle      Breast cancer Neg Hx      Ovarian cancer Neg Hx       Physical Exam:   BP (!) 141/98 (Patient Position: Sitting)   Pulse 106   Ht 5' 6" (1.676 m)   Wt 65.5 kg (144 lb 6.4 oz)   LMP 05/17/2023 (Approximate)   SpO2 99%   BMI 23.31 kg/m²      Physical Exam  Constitutional:       General: She is not in acute distress.     Appearance: Normal appearance. She is normal weight. She is not ill-appearing.   HENT:      Head: Normocephalic and atraumatic.      Nose: Nose normal. No congestion or rhinorrhea.      Mouth/Throat:      Mouth: Mucous membranes are moist.      Pharynx: Oropharynx is clear. No oropharyngeal exudate or posterior oropharyngeal erythema.   Eyes:      General:         Right eye: No discharge.         Left eye: No discharge.      Extraocular Movements: Extraocular movements intact.      Conjunctiva/sclera: Conjunctivae normal.   Neck:      Vascular: No carotid bruit.   Cardiovascular:      Rate and Rhythm: Regular rhythm. Tachycardia present.      Pulses: Normal pulses.           Carotid pulses are 2+ on the right side and 2+ on the left side.       Radial pulses are 2+ on the right side and 2+ on the left side.        Dorsalis pedis pulses are 2+ on the right side and 2+ on the left side.        Posterior tibial pulses are 2+ on the right side and 2+ on the left side.      Heart sounds: Normal heart sounds. No murmur heard.     No friction rub. No gallop.   Pulmonary:      Effort: Pulmonary effort is normal. No respiratory distress.      Breath sounds: Normal breath sounds. No wheezing or rales.   Musculoskeletal:         General: No swelling. Normal range of motion.      Cervical back: Normal range of motion. No tenderness.      Right lower leg: No edema.      Left lower leg: No edema.   Skin:     General: Skin is warm and " dry.      Coloration: Skin is not jaundiced.      Findings: No bruising or lesion.   Neurological:      General: No focal deficit present.      Mental Status: She is alert and oriented to person, place, and time. Mental status is at baseline.   Psychiatric:         Mood and Affect: Mood normal.         Behavior: Behavior normal.         Thought Content: Thought content normal.         Judgment: Judgment normal.        Labs:     Blood Tests:  Lab Results   Component Value Date     08/16/2024    K 4.1 08/16/2024     08/16/2024    CO2 27 08/16/2024    BUN 14 08/16/2024    CREATININE 0.8 08/16/2024    GLU 96 08/16/2024    HGBA1C 5.5 04/16/2024    AST 23 08/16/2024    ALT 20 08/16/2024    ALBUMIN 4.3 08/16/2024    PROT 7.7 08/16/2024    BILITOT 0.6 08/16/2024    WBC 9.22 04/16/2024    HGB 14.2 04/16/2024    HCT 43.0 04/16/2024    MCV 98 04/16/2024     04/16/2024    INR 1.1 04/21/2023    TSH 0.815 04/16/2024       Lab Results   Component Value Date    CHOL 207 (H) 04/16/2024    HDL 61 04/16/2024    TRIG 98 04/16/2024       Lab Results   Component Value Date    LDLCALC 126.4 04/16/2024       Lab Results   Component Value Date    TSH 0.815 04/16/2024       Lab Results   Component Value Date    HGBA1C 5.5 04/16/2024     Imaging:     Echocardiogram  VIOLA 04/14/23  The left ventricle is normal in size with normal systolic function.  The estimated ejection fraction is 58%.  There are mild resting segmental left ventricular wall motion abnormalities.  There is abnormal septal wall motion.  Normal left ventricular diastolic function.  Normal right ventricular size with normal right ventricular systolic function.  Mild aortic regurgitation.  Normal central venous pressure (3 mmHg).  The estimated PA systolic pressure is 25 mmHg.  The test was stopped because the patient experienced fatigue.  The patient's exercise capacity was average.  During stress, the following significant arrhythmias were observed:  occasional PVCs.  The ECG portion of this study is negative for myocardial ischemia.  The stress echo portion of this study is positive for myocardial ischemia.LVEF remains the same or slightly drops to low to mid 50s.Several walls stayed the same , eukinetic, and the distal anteroseptum worsened.     Stress testing  None      Cath Lab  [facility] 23  Left Main  The vessel is angiographically normal.     Left Anterior Descending  The vessel is angiographically normal.     First Diagonal Branch  The vessel is angiographically normal.     Second Diagonal Branch  The vessel is angiographically normal.     [employer]  The vessel is angiographically normal.     Left Circumflex  Exhibits minimal luminal irregularities.     First Obtuse Marginal Branch  The vessel is angiographically normal.     Second Obtuse Marginal Branch  The vessel is angiographically normal.     Third Obtuse Marginal Branch  The vessel is small and is angiographically normal.     Right Coronary Artery  The vessel is angiographically normal.     Right Posterior Descending Artery  The vessel is angiographically normal.     Right Posterior Atrioventricular Artery  The vessel is angiographically normal.     First Right Posterolateral Branch  The vessel is angiographically normal.     Second Right Posterolateral Branch  The vessel is angiographically normal.     Third Right Posterolateral Branch  The vessel is angiographically normal.      Minimal luminal irregularities in LCx on angiography. The remainder of the coronary arteries appear normal on angiography.      Other  CT Chest 24  Aorta: Left-sided aortic arch. Fusiform dilatation ascending thoracic aorta, 4.2 cm. Mild calcified plaque along the aortic arch.      CT Chest 23  Aorta: Left-sided aortic arch. Fusiform dilatation ascending thoracic aorta measuring 4 cm.. No significant calcified plaque.      EK23: Normal sinus rhythm with sinus arrhythmia 65 bpm (personally  reviewed)    Assessment:     1. Coronary artery disease of native artery of native heart with stable angina pectoris    2. Primary hypertension    3. Mixed hyperlipidemia    4. Aneurysm of ascending aorta without rupture    5. History of left heart catheterization    6. Smoker      Plan:     Coronary artery disease of native artery of native heart with stable angina pectoris  History of left heart catheterization  [facility] 4/23 with minimal luminal irregularities in LCx on angiography. The remainder of the coronary arteries appear normal on angiography. Previous PET stress request denied.   Continue ASA 81 mg daily   Continue statin as above  Discussed importance of smoking cessation   Exercise for 30 minutes a day 5 x week     Primary hypertension  /98  Continue propranolol 60 mg daily   Continue HCTZ 12.5 mg   Start Amlodipine 5 mg qd  Discussed with patient the we can stop HCTZ in future and increase Amlodipine  Previously on losartan and Imdur, did not tolerate    Recommend she maintain BP log, discussed smoking cessation and limiting alcohol consumption     Mixed hyperlipidemia  LDL pending   Continue Rosuvastatin 5 mg qd     Aneurysm of ascending aorta without rupture  Stable at 4.1 cm  Followed by cardiothoracic surgery with Dr. Norris    Smoker  Tobacco cessation encouraged   Smokes 1/2 PPD      Follow up in one year      Signed:  Sharon Lagos, PA-C [facility]     11/5/2024 1:36 PM    Follow-up:     Future Appointments   Date Time Provider Department Center   11/6/2024  8:40 AM [facility] [id number] [facility]   1/23/2025  8:30 AM Lore Banerjee PA-C [facility]   4/17/2025  8:45 AM CHEY Duke MD [facility]

## 2024-11-01 ENCOUNTER — TELEPHONE (OUTPATIENT)
Dept: CARDIOLOGY | Facility: CLINIC | Age: 54
End: 2024-11-01
Payer: COMMERCIAL

## 2024-11-05 ENCOUNTER — LAB VISIT (OUTPATIENT)
Dept: LAB | Facility: OTHER | Age: 54
End: 2024-11-05
Payer: COMMERCIAL

## 2024-11-05 ENCOUNTER — OFFICE VISIT (OUTPATIENT)
Dept: CARDIOLOGY | Facility: CLINIC | Age: 54
End: 2024-11-05
Payer: COMMERCIAL

## 2024-11-05 VITALS
SYSTOLIC BLOOD PRESSURE: 141 MMHG | HEART RATE: 106 BPM | WEIGHT: 144.38 LBS | OXYGEN SATURATION: 99 % | DIASTOLIC BLOOD PRESSURE: 98 MMHG | BODY MASS INDEX: 23.2 KG/M2 | HEIGHT: 66 IN

## 2024-11-05 DIAGNOSIS — F17.200 SMOKER: ICD-10-CM

## 2024-11-05 DIAGNOSIS — I71.21 ANEURYSM OF ASCENDING AORTA WITHOUT RUPTURE: ICD-10-CM

## 2024-11-05 DIAGNOSIS — E78.2 MIXED HYPERLIPIDEMIA: ICD-10-CM

## 2024-11-05 DIAGNOSIS — I10 PRIMARY HYPERTENSION: ICD-10-CM

## 2024-11-05 DIAGNOSIS — I25.118 CORONARY ARTERY DISEASE OF NATIVE ARTERY OF NATIVE HEART WITH STABLE ANGINA PECTORIS: Primary | ICD-10-CM

## 2024-11-05 DIAGNOSIS — Z98.890 HISTORY OF LEFT HEART CATHETERIZATION: ICD-10-CM

## 2024-11-05 LAB
ALBUMIN SERPL BCP-MCNC: 4.3 G/DL (ref 3.5–5.2)
ALP SERPL-CCNC: 74 U/L (ref 40–150)
ALT SERPL W/O P-5'-P-CCNC: 17 U/L (ref 10–44)
ANION GAP SERPL CALC-SCNC: 11 MMOL/L (ref 8–16)
AST SERPL-CCNC: 21 U/L (ref 10–40)
BILIRUB SERPL-MCNC: 0.9 MG/DL (ref 0.1–1)
BUN SERPL-MCNC: 11 MG/DL (ref 6–20)
CALCIUM SERPL-MCNC: 9.9 MG/DL (ref 8.7–10.5)
CHLORIDE SERPL-SCNC: 104 MMOL/L (ref 95–110)
CHOLEST SERPL-MCNC: 273 MG/DL (ref 120–199)
CHOLEST/HDLC SERPL: 3.4 {RATIO} (ref 2–5)
CO2 SERPL-SCNC: 25 MMOL/L (ref 23–29)
CREAT SERPL-MCNC: 0.9 MG/DL (ref 0.5–1.4)
EST. GFR  (NO RACE VARIABLE): >60 ML/MIN/1.73 M^2
GLUCOSE SERPL-MCNC: 103 MG/DL (ref 70–110)
HDLC SERPL-MCNC: 81 MG/DL (ref 40–75)
HDLC SERPL: 29.7 % (ref 20–50)
LDLC SERPL CALC-MCNC: 168.4 MG/DL (ref 63–159)
NONHDLC SERPL-MCNC: 192 MG/DL
POTASSIUM SERPL-SCNC: 3.6 MMOL/L (ref 3.5–5.1)
PROT SERPL-MCNC: 7.8 G/DL (ref 6–8.4)
SODIUM SERPL-SCNC: 140 MMOL/L (ref 136–145)
TRIGL SERPL-MCNC: 118 MG/DL (ref 30–150)

## 2024-11-05 PROCEDURE — 3008F BODY MASS INDEX DOCD: CPT | Mod: CPTII,S$GLB,,

## 2024-11-05 PROCEDURE — 99999 PR PBB SHADOW E&M-EST. PATIENT-LVL IV: CPT | Mod: PBBFAC,,,

## 2024-11-05 PROCEDURE — 99214 OFFICE O/P EST MOD 30 MIN: CPT | Mod: S$GLB,,,

## 2024-11-05 PROCEDURE — 3044F HG A1C LEVEL LT 7.0%: CPT | Mod: CPTII,S$GLB,,

## 2024-11-05 PROCEDURE — 3080F DIAST BP >= 90 MM HG: CPT | Mod: CPTII,S$GLB,,

## 2024-11-05 PROCEDURE — 1160F RVW MEDS BY RX/DR IN RCRD: CPT | Mod: CPTII,S$GLB,,

## 2024-11-05 PROCEDURE — 36415 COLL VENOUS BLD VENIPUNCTURE: CPT

## 2024-11-05 PROCEDURE — 80061 LIPID PANEL: CPT

## 2024-11-05 PROCEDURE — 80053 COMPREHEN METABOLIC PANEL: CPT

## 2024-11-05 PROCEDURE — 3077F SYST BP >= 140 MM HG: CPT | Mod: CPTII,S$GLB,,

## 2024-11-05 PROCEDURE — 1159F MED LIST DOCD IN RCRD: CPT | Mod: CPTII,S$GLB,,

## 2024-11-05 RX ORDER — AMLODIPINE BESYLATE 5 MG/1
5 TABLET ORAL DAILY
Qty: 90 TABLET | Refills: 3 | Status: SHIPPED | OUTPATIENT
Start: 2024-11-05 | End: 2025-11-05

## 2024-11-06 ENCOUNTER — HOSPITAL ENCOUNTER (OUTPATIENT)
Dept: RADIOLOGY | Facility: OTHER | Age: 54
Discharge: HOME | End: 2024-11-06
Attending: STUDENT IN AN ORGANIZED HEALTH CARE EDUCATION/TRAINING PROGRAM
Payer: COMMERCIAL

## 2024-11-06 DIAGNOSIS — Z13.820 ENCOUNTER FOR SCREENING FOR OSTEOPOROSIS: ICD-10-CM

## 2024-11-06 PROCEDURE — 77080 DXA BONE DENSITY AXIAL: CPT | Mod: 26,,, | Performed by: RADIOLOGY

## 2024-11-06 PROCEDURE — 77080 DXA BONE DENSITY AXIAL: CPT | Mod: TC

## 2024-11-13 ENCOUNTER — PATIENT MESSAGE (OUTPATIENT)
Dept: ADMINISTRATIVE | Facility: HOSPITAL | Age: 54
End: 2024-11-13
Payer: COMMERCIAL

## 2024-11-26 RX ORDER — FEZOLINETANT 45 MG/1
45 TABLET, FILM COATED ORAL DAILY
Qty: 30 TABLET | Refills: 11 | Status: CANCELLED | OUTPATIENT
Start: 2024-11-26

## 2024-12-03 ENCOUNTER — TELEPHONE (OUTPATIENT)
Dept: OBSTETRICS AND GYNECOLOGY | Facility: CLINIC | Age: 54
End: 2024-12-03
Payer: COMMERCIAL

## 2024-12-03 RX ORDER — FEZOLINETANT 45 MG/1
45 TABLET, FILM COATED ORAL DAILY
Qty: 90 TABLET | Refills: 0 | Status: CANCELLED | OUTPATIENT
Start: 2024-12-03

## 2024-12-03 NOTE — TELEPHONE ENCOUNTER
Phone call made to patient.  No answer.  Voicemail left to return MD phone call.    Needs to continue LFT monitoring at 3,6, 9 months.  Also needs to review meds.

## 2024-12-05 ENCOUNTER — TELEPHONE (OUTPATIENT)
Dept: OBSTETRICS AND GYNECOLOGY | Facility: CLINIC | Age: 54
End: 2024-12-05
Payer: COMMERCIAL

## 2024-12-05 NOTE — TELEPHONE ENCOUNTER
----- Message from Daniele sent at 12/5/2024  8:48 AM CST -----  Regarding: Self [phone]  Type: Patient Call Back    Who called: Self     What is the request in detail: called to get a follow up clarification on her refill request she put in. Would like a call back as soon as able to.     Can the clinic reply by [facility]? No     Would the patient rather a call back or a response via [facility]? Call back     Best call back number: [phone]     Additional Information:    Thank you.

## 2024-12-06 NOTE — TELEPHONE ENCOUNTER
Spoke with patient.  She is on propranolol so needs to come off veozah at this time.  Will see if another beta blocker is an option.  If not can look at other options for hot flush management.  R/b/a reviewed and all questions answered.  She is in agreement with plan.  Will follow up results

## 2024-12-09 ENCOUNTER — PATIENT MESSAGE (OUTPATIENT)
Dept: CARDIOLOGY | Facility: CLINIC | Age: 54
End: 2024-12-09
Payer: COMMERCIAL

## 2024-12-09 DIAGNOSIS — I10 HYPERTENSION, UNSPECIFIED TYPE: Primary | ICD-10-CM

## 2024-12-09 DIAGNOSIS — I25.10 CORONARY ARTERY DISEASE, UNSPECIFIED VESSEL OR LESION TYPE, UNSPECIFIED WHETHER ANGINA PRESENT, UNSPECIFIED WHETHER NATIVE OR TRANSPLANTED HEART: ICD-10-CM

## 2024-12-09 RX ORDER — METOPROLOL SUCCINATE 50 MG/1
50 TABLET, EXTENDED RELEASE ORAL DAILY
Qty: 90 TABLET | Refills: 3 | Status: SHIPPED | OUTPATIENT
Start: 2024-12-09 | End: 2025-12-09

## 2024-12-09 NOTE — PROGRESS NOTES
Message received from Dr. Lopez in regards to possible alternative beta blocker therapy due to contraindication between propranolol and veozah. Will switch propranolol to metoprolol at this time. Attempted to reach out to patient. Will send message through patient portal.

## 2024-12-11 ENCOUNTER — PATIENT MESSAGE (OUTPATIENT)
Dept: ADMINISTRATIVE | Facility: HOSPITAL | Age: 54
End: 2024-12-11
Payer: COMMERCIAL

## 2024-12-11 RX ORDER — FEZOLINETANT 45 MG/1
45 TABLET, FILM COATED ORAL DAILY
Qty: 30 TABLET | Refills: 6 | Status: SHIPPED | OUTPATIENT
Start: 2024-12-11

## 2024-12-11 NOTE — TELEPHONE ENCOUNTER
Reviewed with Cardiology.  Will switch beta blocker.  Also she is no longer doing fioricet.  Refills sent and all questions answered.

## 2025-01-22 ENCOUNTER — TELEPHONE (OUTPATIENT)
Dept: OBSTETRICS AND GYNECOLOGY | Facility: CLINIC | Age: 55
End: 2025-01-22
Payer: COMMERCIAL

## 2025-01-22 NOTE — TELEPHONE ENCOUNTER
----- Message from [employer] [facility] sent at 1/22/2025 11:33 AM CST -----  Regarding: Self [phone]  Type: Patient Call Back    Who called: Self     What is the request in detail: pt called to see if she can change her appt tomorrow for a virtual. Would like a call back.     Can the clinic reply by [facility]? No     Would the patient rather a call back or a response via [facility]? Call back     Best call back number: [phone]     Additional Information:    Thank you.

## 2025-01-23 ENCOUNTER — OFFICE VISIT (OUTPATIENT)
Dept: OBSTETRICS AND GYNECOLOGY | Facility: CLINIC | Age: 55
End: 2025-01-23
Attending: STUDENT IN AN ORGANIZED HEALTH CARE EDUCATION/TRAINING PROGRAM
Payer: COMMERCIAL

## 2025-01-23 DIAGNOSIS — N80.9 ENDOMETRIOSIS: ICD-10-CM

## 2025-01-23 DIAGNOSIS — I25.118 CORONARY ARTERY DISEASE OF NATIVE ARTERY OF NATIVE HEART WITH STABLE ANGINA PECTORIS: ICD-10-CM

## 2025-01-23 DIAGNOSIS — N95.1 MENOPAUSAL SYMPTOMS: Primary | ICD-10-CM

## 2025-01-23 RX ORDER — PROGESTERONE 100 MG/1
200 CAPSULE ORAL NIGHTLY
Qty: 180 CAPSULE | Refills: 3 | Status: SHIPPED | OUTPATIENT
Start: 2025-01-23

## 2025-01-23 NOTE — PROGRESS NOTES
The patient location is: home  The chief complaint leading to consultation is: HRT consult    Visit type: audiovisual    Face to Face time with patient: 20 minutes  30 minutes of total time spent on the encounter, which includes face to face time and non-face to face time preparing to see the patient (eg, review of tests), Obtaining and/or reviewing separately obtained history, Documenting clinical information in the electronic or other health record, Independently interpreting results (not separately reported) and communicating results to the patient/family/caregiver, or Care coordination (not separately reported).         Each patient to whom he or she provides medical services by telemedicine is:  (1) informed of the relationship between the physician and patient and the respective role of any other health care provider with respect to management of the patient; and (2) notified that he or she may decline to receive medical services by telemedicine and may withdraw from such care at any time.    Notes:    Subjective:      Shoaib Esqueda is a 54 y.o. female who presents for HRT consult. Menarche around age 13-14. She is . She reports infrequent periods growing up. Eventually diagnosed with PCOS and endometriosis. Surgery for endometriosis in  and did not have a period after surgery at age 52. She reports night sweats, urinary frequency, hair thinning, reduced appetite and low libido. She is currently taking progesterone 100mg QHS and Veozah 45 QD. However, continues to have the hot flashes. She is interested in low dose estradiol. Not currently sexually active and worries about side effects of testosterone. She just started oral minoxidil for hair loss. She is requesting to go ahead and increase progesterone to 200mg QHS.  Per her chart, she does have a history of CAD, aortic aneurysm, hypertension and hyperlipidemia. She is followed by cardiology.  She denies the following contraindications to HRT:   Vaginal bleeding, history of VTE/PE, thrombophilia,  breast cancer, or active liver disease.       PCP: Dr. Cesar Duke    Routine labs: 11/5/2024  WWE: 10/10/2024 with Dr. Lopez  Pap smear: 7/22/2020  Mammogram: 6/20/2024  DEXA: 11/6/2024 normal TC 5.74%   Colonoscopy: 12/30/2024 repeat in 5 years    Lab Visit on 11/05/2024   Component Date Value Ref Range Status    Cholesterol 11/05/2024 273 (H)  120 - 199 mg/dL Final    Triglycerides 11/05/2024 118  30 - 150 mg/dL Final    HDL 11/05/2024 81 (H)  40 - 75 mg/dL Final    LDL Cholesterol 11/05/2024 168.4 (H)  63.0 - 159.0 mg/dL Final    HDL/Cholesterol Ratio 11/05/2024 29.7  20.0 - 50.0 % Final    Total Cholesterol/HDL Ratio 11/05/2024 3.4  2.0 - 5.0 Final    Non-HDL Cholesterol 11/05/2024 192  mg/dL Final    Sodium 11/05/2024 140  136 - 145 mmol/L Final    Potassium 11/05/2024 3.6  3.5 - 5.1 mmol/L Final    Chloride 11/05/2024 104  95 - 110 mmol/L Final    CO2 11/05/2024 25  23 - 29 mmol/L Final    Glucose 11/05/2024 103  70 - 110 mg/dL Final    BUN 11/05/2024 11  6 - 20 mg/dL Final    Creatinine 11/05/2024 0.9  0.5 - 1.4 mg/dL Final    Calcium 11/05/2024 9.9  8.7 - 10.5 mg/dL Final    Total Protein 11/05/2024 7.8  6.0 - 8.4 g/dL Final    Albumin 11/05/2024 4.3  3.5 - 5.2 g/dL Final    Total Bilirubin 11/05/2024 0.9  0.1 - 1.0 mg/dL Final    Alkaline Phosphatase 11/05/2024 74  40 - 150 U/L Final    AST 11/05/2024 21  10 - 40 U/L Final    ALT 11/05/2024 17  10 - 44 U/L Final    eGFR 11/05/2024 >60.0  >60 mL/min/1.73 m^2 Final    Anion Gap 11/05/2024 11  8 - 16 mmol/L Final       Past Medical History:   Diagnosis Date    Hepatitis C     2000 Rx w/ chemo x 3 months.  Current  viral load is zero  Recent IUD (copper)     Lung nodules     x 2    Migraine 09/05/2014    Nasal polyps 09/05/2014    PCOS (polycystic ovarian syndrome) 09/05/2014    PONV (postoperative nausea and vomiting)     PPD positive 10/27/2012    Uses contact lenses      Past Surgical History:    Procedure Laterality Date    angimeioma finger      AUGMENTATION OF BREAST Bilateral 1987    CORONARY ANGIOGRAPHY N/A 2023    Procedure: ANGIOGRAM, CORONARY ARTERY;  Surgeon: Esteban Restrepo MD;  Location: [facility] CATH LAB;  Service: Cardiology;  Laterality: N/A;  low bleeding risk 2.0%    DIAGNOSTIC LAPAROSCOPY N/A 2023    Procedure: LAPAROSCOPY, DIAGNOSTIC;  Surgeon: Linda Lopez MD;  Location: [facility], [place] OR;  Service: OB/GYN;  Laterality: N/A;  60 MINUTES    FACIAL RECONSTRUCTION SURGERY      flat feet repair Bilateral     HYSTEROSCOPY N/A 2023    Procedure: HYSTEROSCOPY;  Surgeon: Linda Lopez MD;  Location: [facility], [place] OR;  Service: OB/GYN;  Laterality: N/A;    SINUS SURGERY      x4     Social History     Tobacco Use    Smoking status: Every Day     Current packs/day: 0.75     Average packs/day: 0.8 packs/day for 39.1 years (33.0 ttl pk-yrs)     Types: Cigarettes     Start date:      Passive exposure: Past    Smokeless tobacco: Never    Tobacco comments:     Pt stop smoking 2023 after receiving CT Results   Substance Use Topics    Alcohol use: Yes     Alcohol/week: 16.0 standard drinks of alcohol     Types: 2 Shots of liquor, 14 Standard drinks or equivalent per week     Comment: 2 shots per night    Drug use: No     Family History   Problem Relation Name Age of Onset    Hypertension Mother      Heart disease Father      Hypertension Father      Cancer Maternal Aunt      Cancer Maternal Uncle      Breast cancer Neg Hx      Ovarian cancer Neg Hx       OB History    Para Term  AB Living   3       3     SAB IAB Ectopic Multiple Live Births     3     0      # Outcome Date GA Lbr Beny/2nd Weight Sex Type Anes PTL Lv   3 IAB            2 IAB            1 IAB                Current Outpatient Medications:     amLODIPine (NORVASC) 5 MG tablet, Take 1 tablet (5 mg total) by mouth once daily., Disp: 90 tablet, Rfl: 3    cetirizine (ZYRTEC) 5 MG tablet, Take 5 mg by mouth  once daily., Disp: , Rfl:     EMGALITY  mg/mL PnIj, ADMINISTER 1 ML(120 MG) UNDER THE SKIN EVERY 28 DAYS, Disp: 3 mL, Rfl: 3    fezolinetant (VEOZAH) 45 mg Tab, Take 1 tablet (45 mg) by mouth once daily., Disp: 30 tablet, Rfl: 6    hydroCHLOROthiazide (HYDRODIURIL) 12.5 MG Tab, Take 1 tablet (12.5 mg total) by mouth once daily., Disp: 90 tablet, Rfl: 3    metoprolol succinate (TOPROL-XL) 50 MG 24 hr tablet, Take 1 tablet (50 mg total) by mouth once daily., Disp: 90 tablet, Rfl: 3    progesterone (PROMETRIUM) 100 MG capsule, Take 2 capsules (200 mg total) by mouth every evening., Disp: 180 capsule, Rfl: 3    rosuvastatin (CRESTOR) 5 MG tablet, Take 1 tablet (5 mg total) by mouth once daily., Disp: 90 tablet, Rfl: 3    valACYclovir (VALTREX) 1000 MG tablet, TAKE 1 TABLET(1000 MG) BY MOUTH EVERY DAY, Disp: 90 tablet, Rfl: 2    The 10-year ASCVD risk score (Mamie LATIF, et al., 2019) is: 7.5%    Values used to calculate the score:      Age: 54 years      Sex: Female      Is Non- : No      Diabetic: No      Tobacco smoker: Yes      Systolic Blood Pressure: 141 mmHg      Is BP treated: Yes      HDL Cholesterol: 81 mg/dL      Total Cholesterol: 273 mg/dL    Review of Systems:  General: No fever, chills, or weight loss.  Chest: No chest pain, shortness of breath, or palpitations.  Breast: No pain, masses, or nipple discharge.  Vulva: No pain, lesions, or itching.  Vagina: No relaxation, itching, discharge, or lesions.  Abdomen: No pain, nausea, vomiting, diarrhea, or constipation.  Urinary: No incontinence, nocturia, frequency, or dysuria.  Extremities:  No leg cramps, edema, or calf pain.  Neurologic: No headaches, dizziness, or visual changes.    Objective:   There were no vitals filed for this visit.  There is no height or weight on file to calculate BMI.      Physical Exam: Deferred      Assessment:      Menopausal symptoms: Risks and benefits of hormone replacement therapy were discussed.  She does have significant cardiovascular risk factors. Needs clearance from cardiology to be on HRT. We did discuss that transdermal option of estrogen is safer than oral estradiol as transdermal methods decrease risk for blood clots and stroke as they bypass liver metabolism.  If cleared to be on hrt, will use transdermal estradiol only.   -     Ambulatory referral/consult to [facility]  -     progesterone (PROMETRIUM) 100 MG capsule; Take 2 capsules (200 mg total) by mouth every evening.  Dispense: 180 capsule; Refill: 3  -     Estradiol; Future; Expected date: 01/23/2025  -     Testosterone, free; Future; Expected date: 01/23/2025  -     Testosterone; Future; Expected date: 01/23/2025  -     Progesterone; Future; Expected date: 01/23/2025    Endometriosis    Coronary artery disease of native artery of native heart with stable angina pectoris        Plan:   Baseline hormone levels.  She can increase progesterone to 200mg QHS  Message to cardiology for clearance to continue progesterone and start transdermal estradiol.  If cleared by cardiology, will start vivelle dot 0.025mg BIW  Counseled on use  If start estradiol, she can stop Veozah.  We reviewed side effects and benefits of testosterone therapy.  She declines at this time.  Continue minoxidil from outside provider  Follow up in 3 months.     Instructed patient to call if she experiences any side effects or has any questions.    I spent a total of  30 minutes on the day of the visit.This includes face to face time and non-face to face time preparing to see the patient (eg, review of tests), obtaining and/or reviewing separately obtained history, documenting clinical information in the electronic or other health record, independently interpreting results and communicating results to the patient/family/caregiver, or care coordinator.

## 2025-01-24 ENCOUNTER — TELEPHONE (OUTPATIENT)
Dept: OBSTETRICS AND GYNECOLOGY | Facility: CLINIC | Age: 55
End: 2025-01-24
Payer: COMMERCIAL

## 2025-01-24 DIAGNOSIS — I25.118 CORONARY ARTERY DISEASE OF NATIVE ARTERY OF NATIVE HEART WITH STABLE ANGINA PECTORIS: Primary | ICD-10-CM

## 2025-01-24 DIAGNOSIS — N95.1 MENOPAUSAL SYMPTOMS: ICD-10-CM

## 2025-01-24 NOTE — TELEPHONE ENCOUNTER
----- Message from Miranda Lovett PA-C sent at 1/23/2025  1:47 PM CST -----  Héctor Torrez,  Thanks for reaching out! Her ASCVD risk is pretty high and should probably be seen for clearance to discuss risk factor management as well as clearance.     Thank you,  Miranda  ----- Message -----  From: Lore Banerjee PA-C  Sent: 1/23/2025   8:59 AM CST  To: KATY Zayas,  I just saw this patient for HRT consult. She is interested in starting low dose transdermal estradiol, but needs clearance from cardiology prior to starting. Do you think it is ok for her to be on estradiol or should I get her set up with someone for clearance? She saw Dr. Chopra in 2023 but preferred that I asked you. Thanks!  Lore

## 2025-01-27 ENCOUNTER — LAB VISIT (OUTPATIENT)
Dept: LAB | Facility: OTHER | Age: 55
End: 2025-01-27
Attending: PHYSICIAN ASSISTANT
Payer: COMMERCIAL

## 2025-01-27 DIAGNOSIS — N95.1 MENOPAUSAL SYMPTOMS: ICD-10-CM

## 2025-01-27 LAB
ESTRADIOL SERPL-MCNC: 11 PG/ML
PROGEST SERPL-MCNC: 3.7 NG/ML
TESTOST SERPL-MCNC: 28 NG/DL (ref 5–73)

## 2025-01-27 PROCEDURE — 84402 ASSAY OF FREE TESTOSTERONE: CPT | Performed by: PHYSICIAN ASSISTANT

## 2025-01-27 PROCEDURE — 82670 ASSAY OF TOTAL ESTRADIOL: CPT | Performed by: PHYSICIAN ASSISTANT

## 2025-01-27 PROCEDURE — 84403 ASSAY OF TOTAL TESTOSTERONE: CPT | Performed by: PHYSICIAN ASSISTANT

## 2025-01-27 PROCEDURE — 84144 ASSAY OF PROGESTERONE: CPT | Performed by: PHYSICIAN ASSISTANT

## 2025-01-30 LAB — TESTOST FREE SERPL-MCNC: 0.5 PG/ML

## 2025-02-03 NOTE — PROGRESS NOTES
Subjective:   Patient ID:  Shoaib Esqueda is a 54 y.o. female who presents for follow-up of minimal CAD    HPI:  She had only minimal CAD ( CAC 0 in 2023) and wants HRT but daily smokesThe patient has no chest pain, SOB, TIA, palpitations, syncope or pre-syncope.Patient currently exercises many times per week./83.        Review of Systems   Constitutional: Negative for chills, decreased appetite, diaphoresis, fever, malaise/fatigue, night sweats, weight gain and weight loss.   HENT:  Negative for congestion, hoarse voice, nosebleeds, sore throat and tinnitus.    Eyes:  Negative for blurred vision, double vision, vision loss in left eye, vision loss in right eye, visual disturbance and visual halos.   Cardiovascular:  Negative for chest pain, claudication, cyanosis, dyspnea on exertion, irregular heartbeat, leg swelling, near-syncope, orthopnea, palpitations, paroxysmal nocturnal dyspnea and syncope.   Respiratory:  Negative for cough, hemoptysis, shortness of breath, sleep disturbances due to breathing, snoring, sputum production and wheezing.    Endocrine: Negative for cold intolerance, heat intolerance, polydipsia, polyphagia and polyuria.   Hematologic/Lymphatic: Negative for adenopathy and bleeding problem. Does not bruise/bleed easily.   Skin:  Negative for color change, dry skin, flushing, itching, nail changes, poor wound healing, rash, skin cancer, suspicious lesions and unusual hair distribution.   Musculoskeletal:  Negative for arthritis, back pain, falls, gout, joint pain, joint swelling, muscle cramps, muscle weakness, myalgias and stiffness.   Gastrointestinal:  Negative for abdominal pain, anorexia, change in bowel habit, constipation, diarrhea, dysphagia, heartburn, hematemesis, hematochezia, melena and vomiting.   Genitourinary:  Negative for decreased libido, dysuria, hematuria, hesitancy and urgency.   Neurological:  Negative for excessive daytime sleepiness, dizziness, focal weakness,  headaches, light-headedness, loss of balance, numbness, paresthesias, seizures, sensory change, tremors, vertigo and weakness.   Psychiatric/Behavioral:  Negative for altered mental status, depression, hallucinations, memory loss, substance abuse and suicidal ideas. The patient does not have insomnia and is not nervous/anxious.    Allergic/Immunologic: Negative for environmental allergies and hives.       Objective: LMP 05/17/2023 (Approximate)      Physical Exam    Assessment:     1. Aneurysm of ascending aorta without rupture    2. Coronary artery disease of native artery of native heart with stable angina pectoris    3. Mixed hyperlipidemia    4. Primary hypertension    5. Smoker    6. Menopausal symptoms        Plan:   Discussed diet , achieving and maintaining ideal body weight, and exercise.   We reviewed meds in detail.  VTE risk high with HRT and smoking  Could do Lpa, HSCRP  Omega-3 > 800 mg/d combined EPA/DHA.  Low dose HRT OK -I told her to make cigarettes none or very low  Diagnoses and all orders for this visit:    Aneurysm of ascending aorta without rupture    Coronary artery disease of native artery of native heart with stable angina pectoris  -     Ambulatory referral/consult to Cardiology  -     Lipid Panel; Future  -     Comprehensive Metabolic Panel; Future  -     Lipoprotein A (LPA); Future  -     CBC Auto Differential; Future  -     BNP; Future  -     CRP, High Sensitivity; Future    Mixed hyperlipidemia  -     Lipid Panel; Future  -     Comprehensive Metabolic Panel; Future  -     Lipoprotein A (LPA); Future  -     CBC Auto Differential; Future  -     BNP; Future  -     CRP, High Sensitivity; Future    Primary hypertension  -     Lipid Panel; Future  -     Comprehensive Metabolic Panel; Future  -     Lipoprotein A (LPA); Future  -     CBC Auto Differential; Future  -     BNP; Future  -     CRP, High Sensitivity; Future    Smoker    Menopausal symptoms  -     Ambulatory referral/consult to  Cardiology            Follow up for Fasting labs tomorrow Alevism.

## 2025-02-04 ENCOUNTER — OFFICE VISIT (OUTPATIENT)
Dept: CARDIOLOGY | Facility: CLINIC | Age: 55
End: 2025-02-04
Payer: COMMERCIAL

## 2025-02-04 ENCOUNTER — TELEPHONE (OUTPATIENT)
Dept: OBSTETRICS AND GYNECOLOGY | Facility: CLINIC | Age: 55
End: 2025-02-04
Payer: COMMERCIAL

## 2025-02-04 DIAGNOSIS — N95.1 MENOPAUSAL SYMPTOMS: ICD-10-CM

## 2025-02-04 DIAGNOSIS — E78.2 MIXED HYPERLIPIDEMIA: ICD-10-CM

## 2025-02-04 DIAGNOSIS — I71.21 ANEURYSM OF ASCENDING AORTA WITHOUT RUPTURE: Primary | ICD-10-CM

## 2025-02-04 DIAGNOSIS — F17.200 SMOKER: ICD-10-CM

## 2025-02-04 DIAGNOSIS — I10 PRIMARY HYPERTENSION: ICD-10-CM

## 2025-02-04 DIAGNOSIS — I25.118 CORONARY ARTERY DISEASE OF NATIVE ARTERY OF NATIVE HEART WITH STABLE ANGINA PECTORIS: ICD-10-CM

## 2025-02-04 DIAGNOSIS — N95.1 MENOPAUSAL SYMPTOMS: Primary | ICD-10-CM

## 2025-02-04 PROCEDURE — 1159F MED LIST DOCD IN RCRD: CPT | Mod: CPTII,95,, | Performed by: INTERNAL MEDICINE

## 2025-02-04 PROCEDURE — 1160F RVW MEDS BY RX/DR IN RCRD: CPT | Mod: CPTII,95,, | Performed by: INTERNAL MEDICINE

## 2025-02-04 PROCEDURE — 98005 SYNCH AUDIO-VIDEO EST LOW 20: CPT | Mod: 95,,, | Performed by: INTERNAL MEDICINE

## 2025-02-04 RX ORDER — ESTRADIOL 0.03 MG/D
FILM, EXTENDED RELEASE TRANSDERMAL
Qty: 8 PATCH | Refills: 6 | Status: SHIPPED | OUTPATIENT
Start: 2025-02-04 | End: 2026-02-04

## 2025-02-04 NOTE — PATIENT INSTRUCTIONS
Discussed diet , achieving and maintaining ideal body weight, and exercise.   We reviewed meds in detail.  VTE risk high with HRT and smoking  Could do Lpa, HSCRP  Omega-3 > 800 mg/d combined EPA/DHA.  Low dose HRT OK -I told her to make cigarettes none or very low

## 2025-02-04 NOTE — TELEPHONE ENCOUNTER
----- Message from Keny Street MD sent at 2/4/2025  1:55 PM CST -----  Lore,       HRT as low of dose as possible is OK. I told her to make cigarettes none to at least very few,CJL

## 2025-02-05 ENCOUNTER — LAB VISIT (OUTPATIENT)
Dept: LAB | Facility: OTHER | Age: 55
End: 2025-02-05
Attending: INTERNAL MEDICINE
Payer: COMMERCIAL

## 2025-02-05 DIAGNOSIS — I10 PRIMARY HYPERTENSION: ICD-10-CM

## 2025-02-05 DIAGNOSIS — E78.2 MIXED HYPERLIPIDEMIA: ICD-10-CM

## 2025-02-05 DIAGNOSIS — I25.118 CORONARY ARTERY DISEASE OF NATIVE ARTERY OF NATIVE HEART WITH STABLE ANGINA PECTORIS: ICD-10-CM

## 2025-02-05 LAB
ALBUMIN SERPL BCP-MCNC: 4.3 G/DL (ref 3.5–5.2)
ALP SERPL-CCNC: 74 U/L (ref 40–150)
ALT SERPL W/O P-5'-P-CCNC: 21 U/L (ref 10–44)
ANION GAP SERPL CALC-SCNC: 9 MMOL/L (ref 8–16)
AST SERPL-CCNC: 25 U/L (ref 10–40)
BASOPHILS # BLD AUTO: 0.05 K/UL (ref 0–0.2)
BASOPHILS NFR BLD: 0.6 % (ref 0–1.9)
BILIRUB SERPL-MCNC: 0.6 MG/DL (ref 0.1–1)
BNP SERPL-MCNC: 20 PG/ML (ref 0–99)
BUN SERPL-MCNC: 10 MG/DL (ref 6–20)
CALCIUM SERPL-MCNC: 10.3 MG/DL (ref 8.7–10.5)
CHLORIDE SERPL-SCNC: 107 MMOL/L (ref 95–110)
CHOLEST SERPL-MCNC: 175 MG/DL (ref 120–199)
CHOLEST/HDLC SERPL: 2.7 {RATIO} (ref 2–5)
CO2 SERPL-SCNC: 27 MMOL/L (ref 23–29)
CREAT SERPL-MCNC: 0.8 MG/DL (ref 0.5–1.4)
CRP SERPL-MCNC: 0.57 MG/L (ref 0–3.19)
DIFFERENTIAL METHOD BLD: ABNORMAL
EOSINOPHIL # BLD AUTO: 0.2 K/UL (ref 0–0.5)
EOSINOPHIL NFR BLD: 2.5 % (ref 0–8)
ERYTHROCYTE [DISTWIDTH] IN BLOOD BY AUTOMATED COUNT: 12.3 % (ref 11.5–14.5)
EST. GFR  (NO RACE VARIABLE): >60 ML/MIN/1.73 M^2
GLUCOSE SERPL-MCNC: 105 MG/DL (ref 70–110)
HCT VFR BLD AUTO: 42.3 % (ref 37–48.5)
HDLC SERPL-MCNC: 66 MG/DL (ref 40–75)
HDLC SERPL: 37.7 % (ref 20–50)
HGB BLD-MCNC: 14 G/DL (ref 12–16)
IMM GRANULOCYTES # BLD AUTO: 0.05 K/UL (ref 0–0.04)
IMM GRANULOCYTES NFR BLD AUTO: 0.6 % (ref 0–0.5)
LDLC SERPL CALC-MCNC: 89.8 MG/DL (ref 63–159)
LYMPHOCYTES # BLD AUTO: 2.5 K/UL (ref 1–4.8)
LYMPHOCYTES NFR BLD: 30.9 % (ref 18–48)
MCH RBC QN AUTO: 33.6 PG (ref 27–31)
MCHC RBC AUTO-ENTMCNC: 33.1 G/DL (ref 32–36)
MCV RBC AUTO: 101 FL (ref 82–98)
MONOCYTES # BLD AUTO: 0.7 K/UL (ref 0.3–1)
MONOCYTES NFR BLD: 8.6 % (ref 4–15)
NEUTROPHILS # BLD AUTO: 4.6 K/UL (ref 1.8–7.7)
NEUTROPHILS NFR BLD: 56.8 % (ref 38–73)
NONHDLC SERPL-MCNC: 109 MG/DL
NRBC BLD-RTO: 0 /100 WBC
PLATELET # BLD AUTO: 286 K/UL (ref 150–450)
PMV BLD AUTO: 9.8 FL (ref 9.2–12.9)
POTASSIUM SERPL-SCNC: 4.5 MMOL/L (ref 3.5–5.1)
PROT SERPL-MCNC: 7.8 G/DL (ref 6–8.4)
RBC # BLD AUTO: 4.17 M/UL (ref 4–5.4)
SODIUM SERPL-SCNC: 143 MMOL/L (ref 136–145)
TRIGL SERPL-MCNC: 96 MG/DL (ref 30–150)
WBC # BLD AUTO: 8.11 K/UL (ref 3.9–12.7)

## 2025-02-05 PROCEDURE — 85025 COMPLETE CBC W/AUTO DIFF WBC: CPT | Performed by: INTERNAL MEDICINE

## 2025-02-05 PROCEDURE — 80061 LIPID PANEL: CPT | Performed by: INTERNAL MEDICINE

## 2025-02-05 PROCEDURE — 86141 C-REACTIVE PROTEIN HS: CPT | Performed by: INTERNAL MEDICINE

## 2025-02-05 PROCEDURE — 83695 ASSAY OF LIPOPROTEIN(A): CPT | Performed by: INTERNAL MEDICINE

## 2025-02-05 PROCEDURE — 83880 ASSAY OF NATRIURETIC PEPTIDE: CPT | Performed by: INTERNAL MEDICINE

## 2025-02-05 PROCEDURE — 36415 COLL VENOUS BLD VENIPUNCTURE: CPT | Performed by: INTERNAL MEDICINE

## 2025-02-05 PROCEDURE — 80053 COMPREHEN METABOLIC PANEL: CPT | Performed by: INTERNAL MEDICINE

## 2025-02-10 LAB — LPA SERPL-MCNC: <5 MG/DL (ref 0–30)

## 2025-02-10 NOTE — PROGRESS NOTES
Your results look fine and do not require any change in treatment.     Please contact me if you have any additional concerns.    Sincerely,    Keny Street

## 2025-02-26 ENCOUNTER — TELEPHONE (OUTPATIENT)
Dept: CARDIOLOGY | Facility: CLINIC | Age: 55
End: 2025-02-26
Payer: COMMERCIAL

## 2025-02-26 RX ORDER — PROPRANOLOL HYDROCHLORIDE 60 MG/1
60 CAPSULE, EXTENDED RELEASE ORAL DAILY
Qty: 90 CAPSULE | Refills: 3 | Status: SHIPPED | OUTPATIENT
Start: 2025-02-26 | End: 2026-02-26

## 2025-02-26 NOTE — TELEPHONE ENCOUNTER
-- Message from Miranda Lovett PA-C sent at 2/26/2025  7:29 AM CST -----  Regarding: RE: Switch Medicine  Let's stop the metoprolol and restart Propranolol 60 mg qd. She just saw Dr. Street on 2/4 and had repeat labs that showed her LDL was much improved so no need to start ezetimibe or increased her statin at this time. Will just stop the metoprolol and go back to the Propranolol 60 mg.

## 2025-02-26 NOTE — TELEPHONE ENCOUNTER
----- Message from Miranda Lovett PA-C sent at 2/26/2025  7:29 AM CST -----  Regarding: RE: Switch Medicine  Let's stop the metoprolol and restart Propranolol 60 mg qd. She just saw Dr. Street on 2/4 and had repeat labs that showed her LDL was much improved so no need to start ezetimibe or increased her statin at this time. Will just stop the metoprolol and go back to the Propranolol 60 mg. Thank you so much Concepcion!  ----- Message -----  From: Concepcion Mccoy RN  Sent: 2/25/2025   2:40 PM CST  To: Concepcion Mccoy RN; Miranda Lovett PA-C  Subject: FW: Switch Medicine                              Per Ms Lovett :Miranda Lovett PA-C · 12/9/2024 2:19 PMGood afternoon Mrs. Esqueda,I received a message from Dr. Lopez in regards to an alternative beta blocker to replace the Propranolol that way you can continue treatment with veozah. We can definitely try switching from Propranolol to metoprolol, I have sent the prescription to your preferred pharmacy to start. Lets see how you do with the medication switch, we can always go back to the Propranolol if there is any side effects with the metoprolol and find alternatives to the veozah.Let me know if you have any questions or concerns!Thank you,Miranda----------Pt says that she started the metoprolol, took it once, and felt like she was dying , like when she took isosorbide. Apparently w. isosorbide she collapsed on the floor. Also increasing CP.She would like to go back to her prev propanolol  and stop metoprolol. She says that she stopped the veozah now that she is taking HRT.I will also talk to her about starting ezetimibe , increasing statin as per previous message.Please advise on restarting propanolol and I will send you a prescription.  ----- Message -----  From: Sandra Nelson  Sent: 2/25/2025  10:10 AM CST  To: Concepcion Mccoy RN  Subject: Switch Medicine                                  Pt [phone] requesting refill on her blood pressure  medicine. She does not want to take Toprol-XL 50 mg anymore and wants to get back on her old medicine Propranolol.LOV 2/4/25 [id number] - [place] [address] AT [address] Phone: [phone]: [phone]

## 2025-03-07 ENCOUNTER — PATIENT MESSAGE (OUTPATIENT)
Dept: INTERNAL MEDICINE | Facility: CLINIC | Age: 55
End: 2025-03-07
Payer: COMMERCIAL

## 2025-03-07 DIAGNOSIS — B86 SCABIES: ICD-10-CM

## 2025-03-07 RX ORDER — PERMETHRIN 50 MG/G
CREAM TOPICAL
Qty: 60 G | Refills: 0 | Status: SHIPPED | OUTPATIENT
Start: 2025-03-07

## 2025-03-18 ENCOUNTER — PATIENT MESSAGE (OUTPATIENT)
Dept: INTERNAL MEDICINE | Facility: CLINIC | Age: 55
End: 2025-03-18
Payer: COMMERCIAL

## 2025-03-18 RX ORDER — TRIAMCINOLONE ACETONIDE 1 MG/G
CREAM TOPICAL 3 TIMES DAILY PRN
Qty: 80 G | Refills: 3 | Status: SHIPPED | OUTPATIENT
Start: 2025-03-18 | End: 2025-03-28

## 2025-03-18 RX ORDER — TRIAMCINOLONE ACETONIDE 5 MG/G
CREAM TOPICAL 3 TIMES DAILY
Qty: 15 G | Refills: 1 | Status: CANCELLED | OUTPATIENT
Start: 2025-03-18

## 2025-05-06 ENCOUNTER — TELEPHONE (OUTPATIENT)
Dept: CARDIOTHORACIC SURGERY | Facility: CLINIC | Age: 55
End: 2025-05-06
Payer: COMMERCIAL

## 2025-05-06 DIAGNOSIS — I71.21 ANEURYSM OF ASCENDING AORTA WITHOUT RUPTURE: Primary | ICD-10-CM

## 2025-05-06 NOTE — TELEPHONE ENCOUNTER
Called pt to schedule yearly f/u with Dr. Norris. Spoke with pt and scheduled pt for f/u on 6/17 which pt agreed to. Pt verbalized understanding of appt date, times, and locations. Pt denies any questions.

## 2025-05-15 RX ORDER — VALACYCLOVIR HYDROCHLORIDE 1 G/1
1000 TABLET, FILM COATED ORAL
Qty: 90 TABLET | Refills: 1 | Status: SHIPPED | OUTPATIENT
Start: 2025-05-15

## 2025-05-15 NOTE — TELEPHONE ENCOUNTER
Refill Decision Note   Shoaib Diazore  is requesting a refill authorization.  Brief Assessment and Rationale for Refill:  Approve     Medication Therapy Plan:         Comments:     Note composed:7:17 AM 05/15/2025

## 2025-06-09 ENCOUNTER — PATIENT MESSAGE (OUTPATIENT)
Dept: ADMINISTRATIVE | Facility: HOSPITAL | Age: 55
End: 2025-06-09
Payer: COMMERCIAL

## 2025-06-16 ENCOUNTER — TELEPHONE (OUTPATIENT)
Dept: CARDIOTHORACIC SURGERY | Facility: CLINIC | Age: 55
End: 2025-06-16
Payer: COMMERCIAL

## 2025-06-16 NOTE — TELEPHONE ENCOUNTER
Spoke with pt and confirmed 6/17 appt with Dr. Norris. Pt verbalized understanding of appt times and locations. Pt denies any questions at this time.

## 2025-06-17 ENCOUNTER — TELEPHONE (OUTPATIENT)
Dept: PULMONOLOGY | Facility: CLINIC | Age: 55
End: 2025-06-17
Payer: COMMERCIAL

## 2025-06-17 ENCOUNTER — HOSPITAL ENCOUNTER (OUTPATIENT)
Dept: RADIOLOGY | Facility: HOSPITAL | Age: 55
Discharge: HOME | End: 2025-06-17
Attending: THORACIC SURGERY (CARDIOTHORACIC VASCULAR SURGERY)
Payer: COMMERCIAL

## 2025-06-17 ENCOUNTER — OFFICE VISIT (OUTPATIENT)
Dept: CARDIOTHORACIC SURGERY | Facility: CLINIC | Age: 55
End: 2025-06-17
Payer: COMMERCIAL

## 2025-06-17 VITALS
OXYGEN SATURATION: 100 % | WEIGHT: 144.5 LBS | HEIGHT: 66 IN | BODY MASS INDEX: 23.22 KG/M2 | HEART RATE: 57 BPM | SYSTOLIC BLOOD PRESSURE: 149 MMHG | DIASTOLIC BLOOD PRESSURE: 85 MMHG

## 2025-06-17 DIAGNOSIS — I71.21 ANEURYSM OF ASCENDING AORTA WITHOUT RUPTURE: ICD-10-CM

## 2025-06-17 DIAGNOSIS — I71.21 ANEURYSM OF ASCENDING AORTA WITHOUT RUPTURE: Primary | ICD-10-CM

## 2025-06-17 PROCEDURE — 3008F BODY MASS INDEX DOCD: CPT | Mod: CPTII,S$GLB,, | Performed by: THORACIC SURGERY (CARDIOTHORACIC VASCULAR SURGERY)

## 2025-06-17 PROCEDURE — 99999 PR PBB SHADOW E&M-EST. PATIENT-LVL III: CPT | Mod: PBBFAC,,, | Performed by: THORACIC SURGERY (CARDIOTHORACIC VASCULAR SURGERY)

## 2025-06-17 PROCEDURE — 3077F SYST BP >= 140 MM HG: CPT | Mod: CPTII,S$GLB,, | Performed by: THORACIC SURGERY (CARDIOTHORACIC VASCULAR SURGERY)

## 2025-06-17 PROCEDURE — 99213 OFFICE O/P EST LOW 20 MIN: CPT | Mod: S$GLB,,, | Performed by: THORACIC SURGERY (CARDIOTHORACIC VASCULAR SURGERY)

## 2025-06-17 PROCEDURE — 71250 CT THORAX DX C-: CPT | Mod: TC

## 2025-06-17 PROCEDURE — 1159F MED LIST DOCD IN RCRD: CPT | Mod: CPTII,S$GLB,, | Performed by: THORACIC SURGERY (CARDIOTHORACIC VASCULAR SURGERY)

## 2025-06-17 PROCEDURE — 3079F DIAST BP 80-89 MM HG: CPT | Mod: CPTII,S$GLB,, | Performed by: THORACIC SURGERY (CARDIOTHORACIC VASCULAR SURGERY)

## 2025-06-17 NOTE — TELEPHONE ENCOUNTER
----- Message from Jacque Mendez MD sent at 6/17/2025  2:37 PM CDT -----  Regarding: Be on the look out.  Looks like she had a CT of chest with Dr Norris today. Once resulted.  This should be sufficient for yearly screen (looks like a CT of chest and a LDCT are ordered by us.  Hopefully, it doesn't get done  Jacque  ----- Message -----  From: Gagan Norris MD  Sent: 6/17/2025   1:22 PM CDT  To: Jacque Mendez MD; Meghan Mcnamara MD; Cat#

## 2025-06-17 NOTE — PROGRESS NOTES
Subjective:      Patient ID: Shoaib Esqueda is a 54 y.o. female.    Chief Complaint: No chief complaint on file.      HPI:  Shoaib Esqueda is a 54 y.o. female who presents 6 month TAA follow up. Medical conditions include Medical conditions includes HCV s/p treatment, HTN, mix HLD, lung nodules, tobacco use. Patient has known TAA that has remained stable at 4.1 cm .   Today, patient is asymptomatic from CV standpoint. Continues to smoke 7 cigarettes daily. Not interested in quitting. Reports SBP runs lower 130s at home. ( Does not check it daily). No family history of aneurysms.      Current medications Reviewed  Medications Ordered Prior to Encounter[1]    Review of Systems   Constitutional:  Negative for activity change, appetite change, fatigue and fever.   HENT:  Negative for nosebleeds.    Respiratory:  Negative for cough and shortness of breath.    Cardiovascular:  Negative for chest pain, palpitations and leg swelling.   Gastrointestinal:  Negative for abdominal distention, abdominal pain and nausea.   Genitourinary:  Negative for frequency.   Musculoskeletal:  Negative for arthralgias and myalgias.   Skin:  Negative for rash.   Neurological:  Negative for dizziness and numbness.   Hematological:  Does not bruise/bleed easily.     Objective:   Physical Exam  Constitutional:       Appearance: Normal appearance.   HENT:      Head: Normocephalic and atraumatic.   Eyes:      Extraocular Movements: Extraocular movements intact.   Cardiovascular:      Rate and Rhythm: Normal rate.   Pulmonary:      Effort: Pulmonary effort is normal.   Abdominal:      General: Abdomen is flat.   Musculoskeletal:         General: Normal range of motion.      Cervical back: Normal range of motion.   Skin:     General: Skin is warm and dry.      Capillary Refill: Capillary refill takes less than 2 seconds.      Coloration: Skin is not pale.   Neurological:      General: No focal deficit present.      Mental Status: She is alert.          Diagnotic Results: reviewed   CT chest 6/18/2025    CT chest 6/2024  Fusiform dilatation ascending thoracic aorta, 4.2 cm. Mild calcified plaque along the aortic arch.      LHC 4/2023:    Minimal luminal irregularities in LCx on angiography. The remainder of the coronary arteries appear normal on angiography.      Stress ECHO 4/14/2023:  The left ventricle is normal in size with normal systolic function.  The estimated ejection fraction is 58%.  There are mild resting segmental left ventricular wall motion abnormalities.  There is abnormal septal wall motion.  Normal left ventricular diastolic function.  Normal right ventricular size with normal right ventricular systolic function.  Mild aortic regurgitation.  Normal central venous pressure (3 mmHg).  The estimated PA systolic pressure is 25 mmHg.  The test was stopped because the patient experienced fatigue.  The patient's exercise capacity was average.  During stress, the following significant arrhythmias were observed: occasional PVCs.  The ECG portion of this study is negative for myocardial ischemia.  The stress echo portion of this study is positive for myocardial ischemia.LVEF remains the same or slightly drops to low to mid 50s.Several walls stayed the same , eukinetic, and the distal anteroseptum worsened.     CT chest 11/2023  Fusiform dilatation ascending thoracic aorta measuring 4 cm.  Assessment:   TAA ~4.1 cm   Plan:     CTS Attending Note:    I have personally taken the history and examined this patient and agree with the AGATA's note as stated above.  54-year-old woman with known ascending aortic aneurysm.  I reviewed the CT scan from today.  Although the official report is not yet available, by my measurement her ascending aorta remains stable in maximal diameter at 4.2 cm at the level of the right pulmonary artery.  I will plan to see her back in 1 year with a repeat CT scan.  We discussed the importance of smoking cessation.       [1]    Current Outpatient Medications on File Prior to Visit   Medication Sig Dispense Refill    amLODIPine (NORVASC) 5 MG tablet Take 1 tablet (5 mg total) by mouth once daily. 90 tablet 3    cetirizine (ZYRTEC) 5 MG tablet Take 5 mg by mouth once daily.      EMGALITY  mg/mL PnIj ADMINISTER 1 ML(120 MG) UNDER THE SKIN EVERY 28 DAYS 3 mL 3    estradioL (VIVELLE-DOT) 0.025 mg/24 hr Apply patch twice a week (ie Monday and Thursdays) 8 patch 6    hydroCHLOROthiazide (HYDRODIURIL) 12.5 MG Tab Take 1 tablet (12.5 mg total) by mouth once daily. 90 tablet 3    permethrin (ELIMITE) 5 % cream Apply from neck to toes once and leave on for 12 hours 60 g 0    progesterone (PROMETRIUM) 100 MG capsule Take 2 capsules (200 mg total) by mouth every evening. 180 capsule 3    propranoloL (INDERAL LA) 60 MG 24 hr capsule Take 1 capsule (60 mg total) by mouth once daily. 90 capsule 3    rosuvastatin (CRESTOR) 5 MG tablet Take 1 tablet (5 mg total) by mouth once daily. 90 tablet 3    triamcinolone acetonide 0.1% (KENALOG) 0.1 % cream Apply topically 3 (three) times daily as needed (rash). 80 g 3    valACYclovir (VALTREX) 1000 MG tablet TAKE 1 TABLET(1000 MG) BY MOUTH EVERY DAY 90 tablet 1     No current facility-administered medications on file prior to visit.

## 2025-06-18 ENCOUNTER — TELEPHONE (OUTPATIENT)
Dept: PULMONOLOGY | Facility: CLINIC | Age: 55
End: 2025-06-18
Payer: COMMERCIAL

## 2025-06-18 NOTE — TELEPHONE ENCOUNTER
Copied from CRM #[id number]. Topic: Appointments - Appointment Access  >> Jun 18, 2025  4:08 PM Benjamin wrote:  Consult/Advisory    Name Of Caller:Shoaib       Contact Preference:[phone]     Nature of call: pt called regarding a virtual appt to go over her ct scan she stated if it's virtual give her anytime please call to assist

## 2025-07-08 ENCOUNTER — PATIENT MESSAGE (OUTPATIENT)
Dept: INTERNAL MEDICINE | Facility: CLINIC | Age: 55
End: 2025-07-08
Payer: COMMERCIAL

## 2025-07-08 DIAGNOSIS — G43.909 MIGRAINE WITHOUT STATUS MIGRAINOSUS, NOT INTRACTABLE, UNSPECIFIED MIGRAINE TYPE: ICD-10-CM

## 2025-07-08 RX ORDER — BUTALBITAL, ACETAMINOPHEN AND CAFFEINE 50; 325; 40 MG/1; MG/1; MG/1
1 TABLET ORAL DAILY PRN
Qty: 7 TABLET | Refills: 0 | Status: SHIPPED | OUTPATIENT
Start: 2025-07-08

## 2025-07-08 RX ORDER — GALCANEZUMAB 120 MG/ML
INJECTION, SOLUTION SUBCUTANEOUS
Qty: 3 ML | Refills: 2 | Status: SHIPPED | OUTPATIENT
Start: 2025-07-08

## 2025-07-22 ENCOUNTER — PATIENT MESSAGE (OUTPATIENT)
Dept: INTERNAL MEDICINE | Facility: CLINIC | Age: 55
End: 2025-07-22
Payer: COMMERCIAL

## 2025-07-22 DIAGNOSIS — U07.1 COVID-19: Primary | ICD-10-CM

## 2025-08-28 ENCOUNTER — PATIENT OUTREACH (OUTPATIENT)
Dept: ADMINISTRATIVE | Facility: HOSPITAL | Age: 55
End: 2025-08-28
Payer: COMMERCIAL

## 2025-08-28 ENCOUNTER — OFFICE VISIT (OUTPATIENT)
Dept: PULMONOLOGY | Facility: CLINIC | Age: 55
End: 2025-08-28
Payer: COMMERCIAL

## 2025-08-28 ENCOUNTER — TELEPHONE (OUTPATIENT)
Dept: PULMONOLOGY | Facility: CLINIC | Age: 55
End: 2025-08-28

## 2025-08-28 DIAGNOSIS — I72.9 ANEURYSM: ICD-10-CM

## 2025-08-28 DIAGNOSIS — R91.8 PULMONARY NODULES: ICD-10-CM

## 2025-08-28 DIAGNOSIS — Z22.7 TB LUNG, LATENT: ICD-10-CM

## 2025-08-28 DIAGNOSIS — J40 BRONCHITIS: ICD-10-CM

## 2025-08-28 DIAGNOSIS — F17.210 CIGARETTE NICOTINE DEPENDENCE WITHOUT COMPLICATION: Primary | ICD-10-CM

## 2025-08-28 DIAGNOSIS — Z12.4 PAP SMEAR FOR CERVICAL CANCER SCREENING: Primary | ICD-10-CM

## 2025-08-28 DIAGNOSIS — Z12.31 ENCOUNTER FOR SCREENING MAMMOGRAM FOR BREAST CANCER: ICD-10-CM

## 2025-08-28 RX ORDER — ALBUTEROL SULFATE 90 UG/1
2 INHALANT RESPIRATORY (INHALATION) EVERY 6 HOURS PRN
Qty: 25.5 G | Refills: 11 | Status: SHIPPED | OUTPATIENT
Start: 2025-08-28

## 2025-08-28 RX ORDER — FLUTICASONE PROPIONATE AND SALMETEROL 250; 50 UG/1; UG/1
1 POWDER RESPIRATORY (INHALATION) 2 TIMES DAILY
Qty: 1 EACH | Refills: 3 | Status: SHIPPED | OUTPATIENT
Start: 2025-08-28 | End: 2026-08-28

## (undated) DEVICE — JELLY SURGILUBE 5GR

## (undated) DEVICE — TRANSDUCER ADULT DISP

## (undated) DEVICE — SOL NORMAL USPCA 0.9%

## (undated) DEVICE — GLOVE BIOGEL SKINSENSE PI 6.5

## (undated) DEVICE — PACK LAPAROSCOPY BAPTIST

## (undated) DEVICE — SOL IRR SOD CHL .9% POUR

## (undated) DEVICE — PROTECTION STATION PLUS

## (undated) DEVICE — SYS SEE SHARP SCP ANTIFG LNG

## (undated) DEVICE — KIT WING PAD POSITIONING

## (undated) DEVICE — TROCAR ENDOPATH XCEL 5X100MM

## (undated) DEVICE — OMNIPAQUE 350MG 150ML VIAL

## (undated) DEVICE — CATH JACKY RADIAL 5FR 100CM

## (undated) DEVICE — WIRE GUIDE SAFE-T-J .035 260CM

## (undated) DEVICE — CANNULA ENDOPATH XCEL 5X100MM

## (undated) DEVICE — SOL POVIDONE PREP IODINE 4 OZ

## (undated) DEVICE — SOL POVIDONE SCRUB IODINE 4 OZ

## (undated) DEVICE — GUIDEWIRE STF .035X180CM ANG

## (undated) DEVICE — SPIKE CONTRAST CONTROLLER

## (undated) DEVICE — KIT CUSTOM MANIFOLD

## (undated) DEVICE — TRAY CATH LAB OMC

## (undated) DEVICE — SUT MCRYL PLUS 4-0 PS2 27IN

## (undated) DEVICE — NDL INSUFFLATION VERRES 120MM

## (undated) DEVICE — SEALER LIGASURE MARYLAND 37CM

## (undated) DEVICE — HEMOSTAT VASC BAND REG 24CM

## (undated) DEVICE — ELECTRODE REM PLYHSV RETURN 9

## (undated) DEVICE — KIT PROBE COVER WITH GEL

## (undated) DEVICE — KIT GLIDESHEATH SLEND 6FR 10CM